# Patient Record
Sex: FEMALE | Race: WHITE | NOT HISPANIC OR LATINO | Employment: OTHER | URBAN - METROPOLITAN AREA
[De-identification: names, ages, dates, MRNs, and addresses within clinical notes are randomized per-mention and may not be internally consistent; named-entity substitution may affect disease eponyms.]

---

## 2017-02-08 ENCOUNTER — ALLSCRIPTS OFFICE VISIT (OUTPATIENT)
Dept: OTHER | Facility: OTHER | Age: 67
End: 2017-02-08

## 2017-02-08 DIAGNOSIS — Z12.31 ENCOUNTER FOR SCREENING MAMMOGRAM FOR MALIGNANT NEOPLASM OF BREAST: ICD-10-CM

## 2017-02-08 DIAGNOSIS — R73.9 HYPERGLYCEMIA: ICD-10-CM

## 2017-02-08 DIAGNOSIS — E78.5 HYPERLIPIDEMIA: ICD-10-CM

## 2017-02-08 DIAGNOSIS — E03.9 HYPOTHYROIDISM: ICD-10-CM

## 2017-02-21 ENCOUNTER — LAB CONVERSION - ENCOUNTER (OUTPATIENT)
Dept: OTHER | Facility: OTHER | Age: 67
End: 2017-02-21

## 2017-02-21 ENCOUNTER — GENERIC CONVERSION - ENCOUNTER (OUTPATIENT)
Dept: OTHER | Facility: OTHER | Age: 67
End: 2017-02-21

## 2017-02-21 LAB
CHOLEST SERPL-MCNC: 215 MG/DL (ref 125–200)
CHOLEST/HDLC SERPL: 6.3 (CALC)
GLUCOSE, PLASMA (HISTORICAL): 111 MG/DL (ref 65–99)
HBA1C MFR BLD HPLC: 6 % OF TOTAL HGB
HDLC SERPL-MCNC: 34 MG/DL
LDL CHOLESTEROL (HISTORICAL): 140 MG/DL (CALC)
NON-HDL-CHOL (CHOL-HDL) (HISTORICAL): 181 MG/DL (CALC)
TRIGL SERPL-MCNC: 205 MG/DL
TSH SERPL DL<=0.05 MIU/L-ACNC: 1.51 MIU/L (ref 0.4–4.5)

## 2017-07-12 ENCOUNTER — ALLSCRIPTS OFFICE VISIT (OUTPATIENT)
Dept: OTHER | Facility: OTHER | Age: 67
End: 2017-07-12

## 2017-07-12 DIAGNOSIS — E78.5 HYPERLIPIDEMIA: ICD-10-CM

## 2017-07-12 DIAGNOSIS — Z12.11 ENCOUNTER FOR SCREENING FOR MALIGNANT NEOPLASM OF COLON: ICD-10-CM

## 2017-07-12 DIAGNOSIS — E03.9 HYPOTHYROIDISM: ICD-10-CM

## 2017-07-12 DIAGNOSIS — R73.9 HYPERGLYCEMIA: ICD-10-CM

## 2017-07-12 DIAGNOSIS — R55 SYNCOPE AND COLLAPSE: ICD-10-CM

## 2017-07-12 DIAGNOSIS — I10 ESSENTIAL (PRIMARY) HYPERTENSION: ICD-10-CM

## 2017-07-13 ENCOUNTER — LAB CONVERSION - ENCOUNTER (OUTPATIENT)
Dept: OTHER | Facility: OTHER | Age: 67
End: 2017-07-13

## 2017-07-13 LAB
A/G RATIO (HISTORICAL): 1.8 (CALC) (ref 1–2.5)
ALBUMIN SERPL BCP-MCNC: 4.6 G/DL (ref 3.6–5.1)
ALP SERPL-CCNC: 63 U/L (ref 33–130)
ALT SERPL W P-5'-P-CCNC: 30 U/L (ref 6–29)
AST SERPL W P-5'-P-CCNC: 24 U/L (ref 10–35)
BILIRUB SERPL-MCNC: 0.5 MG/DL (ref 0.2–1.2)
BUN SERPL-MCNC: 15 MG/DL (ref 7–25)
BUN/CREA RATIO (HISTORICAL): ABNORMAL (CALC) (ref 6–22)
CALCIUM SERPL-MCNC: 9.5 MG/DL (ref 8.6–10.4)
CHLORIDE SERPL-SCNC: 107 MMOL/L (ref 98–110)
CHOLEST SERPL-MCNC: 225 MG/DL (ref 125–200)
CHOLEST/HDLC SERPL: 5.6 (CALC)
CO2 SERPL-SCNC: 24 MMOL/L (ref 20–31)
CREAT SERPL-MCNC: 0.94 MG/DL (ref 0.5–0.99)
EGFR AFRICAN AMERICAN (HISTORICAL): 72 ML/MIN/1.73M2
EGFR-AMERICAN CALC (HISTORICAL): 62 ML/MIN/1.73M2
GAMMA GLOBULIN (HISTORICAL): 2.6 G/DL (CALC) (ref 1.9–3.7)
GLUCOSE (HISTORICAL): 101 MG/DL (ref 65–99)
HBA1C MFR BLD HPLC: 5.6 % OF TOTAL HGB
HDLC SERPL-MCNC: 40 MG/DL
LDL CHOLESTEROL (HISTORICAL): 140 MG/DL (CALC)
NON-HDL-CHOL (CHOL-HDL) (HISTORICAL): 185 MG/DL (CALC)
POTASSIUM SERPL-SCNC: 4.3 MMOL/L (ref 3.5–5.3)
SODIUM SERPL-SCNC: 141 MMOL/L (ref 135–146)
TOTAL PROTEIN (HISTORICAL): 7.2 G/DL (ref 6.1–8.1)
TRIGL SERPL-MCNC: 227 MG/DL
TSH SERPL DL<=0.05 MIU/L-ACNC: 1.07 MIU/L (ref 0.4–4.5)

## 2017-07-18 ENCOUNTER — APPOINTMENT (OUTPATIENT)
Dept: LAB | Facility: HOSPITAL | Age: 67
End: 2017-07-18
Payer: MEDICARE

## 2017-07-18 DIAGNOSIS — Z12.11 ENCOUNTER FOR SCREENING FOR MALIGNANT NEOPLASM OF COLON: ICD-10-CM

## 2017-07-18 LAB — HEMOCCULT STL QL IA: NEGATIVE

## 2017-07-18 PROCEDURE — G0328 FECAL BLOOD SCRN IMMUNOASSAY: HCPCS

## 2017-07-26 ENCOUNTER — GENERIC CONVERSION - ENCOUNTER (OUTPATIENT)
Dept: OTHER | Facility: OTHER | Age: 67
End: 2017-07-26

## 2017-07-26 ENCOUNTER — HOSPITAL ENCOUNTER (OUTPATIENT)
Dept: NON INVASIVE DIAGNOSTICS | Facility: CLINIC | Age: 67
Discharge: HOME | End: 2017-07-26
Payer: MEDICARE

## 2017-07-26 DIAGNOSIS — R55 SYNCOPE AND COLLAPSE: ICD-10-CM

## 2017-07-26 LAB
CHEST PAIN STATEMENT: NORMAL
MAX DIASTOLIC BP: 100 MMHG
MAX HEART RATE: 91 BPM
MAX PREDICTED HEART RATE: 152 BPM
MAX. SYSTOLIC BP: 160 MMHG
PROTOCOL NAME: NORMAL
REASON FOR TERMINATION: NORMAL
TARGET HR FORMULA: NORMAL
TEST INDICATION: NORMAL
TIME IN EXERCISE PHASE: 180 S

## 2017-07-26 PROCEDURE — 93225 XTRNL ECG REC<48 HRS REC: CPT

## 2017-07-26 PROCEDURE — 93226 XTRNL ECG REC<48 HR SCAN A/R: CPT

## 2017-07-26 PROCEDURE — 78452 HT MUSCLE IMAGE SPECT MULT: CPT

## 2017-07-26 PROCEDURE — 93017 CV STRESS TEST TRACING ONLY: CPT

## 2017-07-26 PROCEDURE — A9502 TC99M TETROFOSMIN: HCPCS

## 2017-07-26 RX ADMIN — REGADENOSON 0.4 MG: 0.08 INJECTION, SOLUTION INTRAVENOUS at 13:51

## 2017-08-02 ENCOUNTER — GENERIC CONVERSION - ENCOUNTER (OUTPATIENT)
Dept: OTHER | Facility: OTHER | Age: 67
End: 2017-08-02

## 2017-11-14 ENCOUNTER — ALLSCRIPTS OFFICE VISIT (OUTPATIENT)
Dept: OTHER | Facility: OTHER | Age: 67
End: 2017-11-14

## 2017-11-14 DIAGNOSIS — E78.5 HYPERLIPIDEMIA: ICD-10-CM

## 2017-11-14 DIAGNOSIS — R73.9 HYPERGLYCEMIA: ICD-10-CM

## 2017-11-14 DIAGNOSIS — E03.9 HYPOTHYROIDISM: ICD-10-CM

## 2017-11-14 DIAGNOSIS — I10 ESSENTIAL (PRIMARY) HYPERTENSION: ICD-10-CM

## 2017-11-18 NOTE — PROGRESS NOTES
Assessment    1  Hypertension (401 9) (I10)   2  Hypothyroidism (244 9) (E03 9)   3  Hyperlipidemia (272 4) (E78 5)   4  Hyperglycemia (790 29) (R73 9)   5  Bilateral impacted cerumen (380 4) (H61 23)    Plan  Bilateral impacted cerumen    · Removal Impacted Cerumen Requiring Instrumentation one or both ears - POC;Status:Complete;   Done: [id number]  Hyperglycemia    · (1) HEMOGLOBIN A1C; Status:Active; Requested for:14Nov2017;   Hyperlipidemia    · (1) LIPID PANEL FASTING W DIRECT LDL REFLEX; Status:Active; Requestedfor:14Nov2017;   Hypertension    · (1) COMPREHENSIVE METABOLIC PANEL; Status:Active; Requested for:14Nov2017;   Hypothyroidism    · (1) TSH; Status:Active; Requested for:14Nov2017;     Discussion/Summary    See the patient back in 4 months unless she has problems sooner  Possible side effects of new medications were reviewed with the patient/guardian today  The treatment plan was reviewed with the patient/guardian  The patient/guardian understands and agrees with the treatment plan      Chief Complaint  4 month follow up      History of Present Illness  The patient presents for follow-up for hypertension, hypothyroidism, anemia, and hyperglycemia  She complains of difficulty hearing out of her ears bilaterally      Review of Systems   Constitutional: No fever, no chills, feels well, no tiredness, no recent weight gain or weight loss  Eyes: No complaints of eye pain, no red eyes, no eyesight problems, no discharge, no dry eyes, no itching of eyes  ENT: hearing loss, but-- no complaints of earache, no loss of hearing, no nose bleeds, no nasal discharge, no sore throat, no hoarseness  Cardiovascular: No complaints of slow heart rate, no fast heart rate, no chest pain, no palpitations, no leg claudication, no lower extremity edema  Respiratory: No complaints of shortness of breath, no wheezing, no cough, no SOB on exertion, no orthopnea, no PND    Gastrointestinal: No complaints of abdominal pain, no constipation, no nausea or vomiting, no diarrhea, no bloody stools  Genitourinary: No complaints of dysuria, no incontinence, no pelvic pain, no dysmenorrhea, no vaginal discharge or bleeding  Musculoskeletal: No complaints of arthralgias, no myalgias, no joint swelling or stiffness, no limb pain or swelling  Integumentary: No complaints of skin rash or lesions, no itching, no skin wounds, no breast pain or lump  Neurological: No complaints of headache, no confusion, no convulsions, no numbness, no dizziness or fainting, no tingling, no limb weakness, no difficulty walking  Psychiatric: Not suicidal, no sleep disturbance, no anxiety or depression, no change in personality, no emotional problems  Endocrine: No complaints of proptosis, no hot flashes, no muscle weakness, no deepening of the voice, no feelings of weakness  Hematologic/Lymphatic: No complaints of swollen glands, no swollen glands in the neck, does not bleed easily, does not bruise easily  Active Problems  1  Anxiety (300 00) (F41 9)   2  Anxiety (300 00) (F41 9)   3  Colon cancer screening (V76 51) (Z12 11)   4  Depression with anxiety (300 4) (F41 8)   5  Dizziness (780 4) (R42)   6  Encounter for screening mammogram for breast cancer (V76 12) (Z12 31)   7  Encounter for vaccination (V05 9) (Z23)   8  Fatigue (780 79) (R53 83)   9  History of family problem (V61 8) (Z84 89)   10  Hyperglycemia (790 29) (R73 9)   11  Hyperlipidemia (272 4) (E78 5)   12  Hypertension (401 9) (I10)   13  Hypothyroidism (244 9) (E03 9)   14  Impairment of balance (781 2) (R26 89)   15  Medicare annual wellness visit, initial (V70 0) (Z00 00)   16  Medicare annual wellness visit, subsequent (V70 0) (Z00 00)   17  Mild cognitive impairment (331 83) (G31 84)   18  Near syncope (780 2) (R55)   19  Need for influenza vaccination (V04 81) (Z23)   20  Screening for genitourinary condition (V81 6) (Z13 89)   21   Screening for neurological condition (V80 09) (Z13 89) 22  Skin tag (701 9) (L91 8)   23  Thyroiditis (245 9) (E06 9)   24  Tremor (781 0) (R25 1)    Past Medical History  1  History of anxiety (V11 8) (Z86 59)   2  History of depression (V11 8) (Z86 59)   3  History of hyperlipidemia (V12 29) (Z86 39)   4  History of stroke (V12 54) (Z86 73)    The active problems and past medical history were reviewed and updated today  Surgical History  1  History of Eye Surgery    The surgical history was reviewed and updated today  Family History  Maternal Uncle    1  Family history of Depression   2  Family history of Diabetes Mellitus (V18 0)    The family history was reviewed and updated today  Social History     · Being A Social Drinker   · Completed 12th grade   · Daily Coffee Consumption (___ Cups/Day)   · Daily Cola Consumption (___ Cans/Day)   · Daily Tea Consumption (___ Cups/Day)   · Never A Smoker   · No drug use   · Single  The social history was reviewed and updated today  The social history was reviewed and is unchanged  Current Meds   1  ALPRAZolam 0 25 MG Oral Tablet; TAKE 1 TABLET 3 times daily As needed for Anxiety/Sleep; Last IV:42QVX3671 Ordered   2  Citalopram Hydrobromide 20 MG Oral Tablet; TAKE ONE AND ONE-HALF TABLET(S) DAILY; Therapy: 13JYL6214 to ([facility]:84WNT5123)  Requested for: 29HRV1729; Last Rx:15Vtq0787 Ordered   3  [facility] Fish Oil 1200 MG Oral Capsule; TAKE 2 CAPSULE Daily; Therapy: (Recorded:47Lif6717) to Recorded   4  Levothyroxine Sodium 25 MCG Oral Tablet; TAKE ONE TABLET BY MOUTH ONCE DAILY; Therapy: 36UMF4870 to (Evaluate:20Apr2018)  Requested for: 25Apr2017; Last Rx:25Apr2017 Ordered   5  Metoprolol Succinate ER 25 MG Oral Tablet Extended Release 24 Hour; TAKE ONE TABLET BY MOUTH ONCE DAILY; Therapy: 09UAL2129 to ([place] June)  Requested for: 25Jan2017; Last Rx:25Jan2017; Status: ACTIVE - Transmit to [facility] Ordered   6  Welchol 625 MG Oral Tablet; take 2 tablets by mouth twice a day;  Therapy: 17APV4729 to (Renew:23Mar2017); Last Rx:26Xor5873 Ordered   7  Zostavax 93722 UNT/0 65ML Subcutaneous Solution Reconstituted; RECONSTITUTE AND INJECT IMMEDIATELY; Therapy: 52SNS4168 to (Last Rx:82Xnx5707) Ordered    The medication list was reviewed and updated today  Allergies  1  No Known Drug Allergies    Vitals  Vital Signs    Recorded: 77AAX3149 01:43PM   Temperature 97 3 F   Heart Rate 70   Systolic 135   Diastolic 80   Height 5 ft 0 5 in   Weight 169 lb    BMI Calculated 32 46   BSA Calculated 1 75       Physical Exam   Constitutional  General appearance: No acute distress, well appearing and well nourished  Head and Face  Head and face: Normal    Palpation of the face and sinuses: No sinus tenderness  Eyes  Conjunctiva and lids: No swelling, erythema or discharge  Pupils and irises: Equal, round, reactive to light  Ophthalmoscopic examination: Normal fundi and optic discs  Ears, Nose, Mouth, and Throat  External inspection of ears and nose: Normal    Otoscopic examination: Abnormal  -- TMs are occluded with cerumen bilaterally it was removed using a cerumen loop until the TMs are fully visualized and intact  Hearing: Normal    Nasal mucosa, septum, and turbinates: Normal without edema or erythema  Lips, teeth, and gums: Normal, good dentition  Oropharynx: Normal with no erythema, edema, exudate or lesions  Neck  Neck: Supple, symmetric, trachea midline, no masses  Thyroid: Normal, no thyromegaly  Pulmonary  Respiratory effort: No increased work of breathing or signs of respiratory distress  Percussion of chest: Normal    Palpation of chest: Normal    Auscultation of lungs: Clear to auscultation  Cardiovascular  Palpation of heart: Normal PMI, no thrills  Auscultation of heart: Normal rate and rhythm, normal S1 and S2, no murmurs  Carotid pulses: 2+ bilaterally  Abdominal aorta: Normal    Femoral pulses: 2+ bilaterally  Pedal pulses: 2+ bilaterally     Examination of extremities for edema and/or varicosities: Normal    Chest  Breasts: Normal, no dimpling or skin changes appreciated  Palpation of breasts and axillae: Normal, no masses palpated  Abdomen  Abdomen: Non-tender, no masses  Liver and spleen: No hepatomegaly or splenomegaly  Examination for hernias: No hernia appreciated  Anus, perineum, and rectum: Normal sphincter tone, no masses, no prolapse  Stool sample for occult blood: Negative  Genitourinary  External genitalia and vagina: Normal, no lesions appreciated  Urethra: Normal, no discharge  Bladder: Not distended, no tenderness  Cervix: Normal, no lesions  Uterus: Normal size, no tenderness, no masses  Adnexa/Parametria: Normal, no masses or tenderness  Lymphatic  Palpation of lymph nodes in neck: No lymphadenopathy  Palpation of lymph nodes in axillae: No lymphadenopathy  Palpation of lymph nodes in groin: No lymphadenopathy  Palpation of lymph nodes in other areas: No lymphadenopathy  Musculoskeletal  Gait and station: Normal    Digits and nails: Normal without clubbing or cyanosis  Joints, bones, and muscles: Normal    Range of motion: Normal    Stability: Normal    Muscle strength/tone: Normal    Skin  Skin and subcutaneous tissue: Normal without rashes or lesions  Palpation of skin and subcutaneous tissue: Normal turgor  Neurologic  Cranial nerves: Cranial nerves II-XII intact  Cortical function: Normal mental status  Reflexes: 2+ and symmetric  Sensation: No sensory loss  Coordination: Normal finger to nose and heel to shin  Psychiatric  Judgment and insight: Normal    Orientation to person, place, and time: Normal    Recent and remote memory: Intact  Mood and affect: Normal        Procedure           Procedure: cerumen removal   Indication: tympanic membrane(s) could not be visualized in both ears  Procedure Note: The procedure was performed by the Provider    A otoscope was placed in the ear canal(s) to visualize the ear canal debris  The ear was cleaned by using a curette  Post-Procedure:  Patient Status: the patient tolerated the procedure well  Complications: there were no complications              Future Appointments    Date/Time Provider Specialty Site   03/15/2018 01:15 PM Tavo Joaquin DO Family Medicine [facility] [address]       Signatures   Electronically signed by : Krystal Anna DO; Nov 17 2017  5:26AM EST                       (Author)

## 2017-12-30 ENCOUNTER — LAB CONVERSION - ENCOUNTER (OUTPATIENT)
Dept: OTHER | Facility: OTHER | Age: 67
End: 2017-12-30

## 2017-12-30 LAB
A/G RATIO (HISTORICAL): 1.7 (CALC) (ref 1–2.5)
ALBUMIN SERPL BCP-MCNC: 4.4 G/DL (ref 3.6–5.1)
ALP SERPL-CCNC: 57 U/L (ref 33–130)
ALT SERPL W P-5'-P-CCNC: 34 U/L (ref 6–29)
AST SERPL W P-5'-P-CCNC: 27 U/L (ref 10–35)
BILIRUB SERPL-MCNC: 0.4 MG/DL (ref 0.2–1.2)
BUN SERPL-MCNC: 17 MG/DL (ref 7–25)
BUN/CREA RATIO (HISTORICAL): ABNORMAL (CALC) (ref 6–22)
CALCIUM SERPL-MCNC: 9.4 MG/DL (ref 8.6–10.4)
CHLORIDE SERPL-SCNC: 107 MMOL/L (ref 98–110)
CHOLEST SERPL-MCNC: 226 MG/DL
CHOLEST/HDLC SERPL: 6.5 (CALC)
CO2 SERPL-SCNC: 22 MMOL/L (ref 20–31)
CREAT SERPL-MCNC: 0.97 MG/DL (ref 0.5–0.99)
EGFR AFRICAN AMERICAN (HISTORICAL): 69 ML/MIN/1.73M2
EGFR-AMERICAN CALC (HISTORICAL): 60 ML/MIN/1.73M2
GAMMA GLOBULIN (HISTORICAL): 2.6 G/DL (CALC) (ref 1.9–3.7)
GLUCOSE (HISTORICAL): 108 MG/DL (ref 65–99)
HBA1C MFR BLD HPLC: 5.5 % OF TOTAL HGB
HDLC SERPL-MCNC: 35 MG/DL
LDL CHOLESTEROL (HISTORICAL): 146 MG/DL (CALC)
NON-HDL-CHOL (CHOL-HDL) (HISTORICAL): 191 MG/DL (CALC)
POTASSIUM SERPL-SCNC: 4.2 MMOL/L (ref 3.5–5.3)
SODIUM SERPL-SCNC: 140 MMOL/L (ref 135–146)
TOTAL PROTEIN (HISTORICAL): 7 G/DL (ref 6.1–8.1)
TRIGL SERPL-MCNC: 273 MG/DL
TSH SERPL DL<=0.05 MIU/L-ACNC: 1.12 MIU/L (ref 0.4–4.5)

## 2018-01-11 NOTE — RESULT NOTES
Verified Results  (1) LIPID PANEL, FASTING 17Wuk1358 12:22PM Abhishek Vilchis     Test Name Result Flag Reference   CHOLESTEROL, TOTAL 205 mg/dL H 125-200   HDL CHOLESTEROL 35 mg/dL L > OR = 46   TRIGLICERIDES 962 mg/dL H <150   LDL-CHOLESTEROL 119 mg/dL (calc)  <130   Desirable range <100 mg/dL for patients with CHD or  diabetes and <70 mg/dL for diabetic patients with  known heart disease  CHOL/HDLC RATIO 5 9 (calc) H < OR = 5 0   NON HDL CHOLESTEROL 170 mg/dL (calc) H    Target for non-HDL cholesterol is 30 mg/dL higher than   LDL cholesterol target

## 2018-01-11 NOTE — RESULT NOTES
Verified Results  (Q) LIPID PANEL WITH REFLEX TO DIRECT LDL [id number] 10:35AM Mariskim Chantell     Test Name Result Flag Reference   CHOLESTEROL, TOTAL 215 mg/dL H 125-200   HDL CHOLESTEROL 34 mg/dL L > OR = 46   TRIGLICERIDES 838 mg/dL H <150   LDL-CHOLESTEROL 140 mg/dL (calc) H <130   Desirable range <100 mg/dL for patients with CHD or  diabetes and <70 mg/dL for diabetic patients with  known heart disease  CHOL/HDLC RATIO 6 3 (calc) H < OR = 5 0   NON HDL CHOLESTEROL 181 mg/dL (calc) H    Target for non-HDL cholesterol is 30 mg/dL higher than   LDL cholesterol target

## 2018-01-12 VITALS
TEMPERATURE: 97.5 F | BODY MASS INDEX: 31.34 KG/M2 | SYSTOLIC BLOOD PRESSURE: 128 MMHG | DIASTOLIC BLOOD PRESSURE: 84 MMHG | HEIGHT: 61 IN | HEART RATE: 64 BPM | WEIGHT: 166 LBS

## 2018-01-12 NOTE — RESULT NOTES
Verified Results  * MRI BRAIN WO CONTRAST [id number] 02:10PM Eron Brow Order Number: [id number]   Performing Comments: Hx of hemorrhage in 2004 with subsequent memory issues and imbalance  Now also wtih tremor and persistent balance and memory issues   - Patient Instructions: To schedule this appointment, please contact Central Scheduling at [phone]  Test Name Result Flag Reference   MRI BRAIN WO CONTRAST (Report)     This is a summary report  The complete report is available in the patient's medical record  If you cannot access the medical record, please contact the sending organization for a detailed fax or copy  MRI BRAIN WITHOUT CONTRAST     INDICATION: Memory loss  Balance disturbance  COMPARISON:  6/10/2004  TECHNIQUE: Sagittal T1, axial T2, axial FLAIR, axial T1, axial Gradient and axial diffusion imaging  Coronal T2      IMAGE QUALITY: Diagnostic  FINDINGS:     BRAIN PARENCHYMA: Stable appearance of a chronic parenchymal hemorrhage identified within the left medial parietal lobe adjacent to the falx  There is chronic hemosiderin staining with mild focal adjacent gliosis  No associated diffusion abnormality  Minimal white matter change within the remainder of the cerebral hemispheres suggesting chronic microangiopathic disease  Normal corpus callosum and hypothalamus  Small white matter lesions within the mynor suggesting chronic microangiopathic disease  Symmetric mesial temporal lobes  No significant mesial temporal lobe volume loss  VENTRICLES: The ventricles are normal in size and contour  SELLA AND PITUITARY GLAND: Normal      ORBITS: Normal      PARANASAL SINUSES: Normal      VASCULATURE: Evaluation of the major intracranial vasculature demonstrates appropriate flow voids       CALVARIUM AND SKULL BASE: Normal      EXTRACRANIAL SOFT TISSUES: Normal        IMPRESSION:     Sequela of prior parenchymal hemorrhage within the medial aspect of the left parietal lobe as seen in 2004  Minimal adjacent gliosis noted on T2 and FLAIR imaging  No signs of acute hemorrhage or acute ischemia  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

## 2018-01-13 VITALS
HEIGHT: 61 IN | BODY MASS INDEX: 31.81 KG/M2 | TEMPERATURE: 98.6 F | SYSTOLIC BLOOD PRESSURE: 122 MMHG | WEIGHT: 168.5 LBS | DIASTOLIC BLOOD PRESSURE: 82 MMHG | HEART RATE: 70 BPM

## 2018-01-13 NOTE — RESULT NOTES
Verified Results  HOLTER MONITOR - [address] 01:17PM Odessa Simmonds Order Number: [id number]    - Patient Instructions: To schedule this appointment, please contact Central Scheduling at [phone]  Test Name Result Flag Reference   HOLTER MONITOR - 50 HOUR (Report)     PT NAME: Barbara Pichardo   : 1950 AGE: 74 y o  GENDER: female   MRN: [id number]  PROCEDURE: Holter monitor - 48 hour     INDICATIONS: Syncope     DESCRIPTION OF FINDINGS:   The patient was monitored for a total of 47 hours and 59 minutes  Predominant rhythm throughout the tracing noted to be sinus mechanism with an average heart rate of 53 beats per minute  A minimum heart rate of 39 beats per minute was noted at 7:22 AM    with a maximum heart rate of 100 beats per minute noted at 2:07 PM      No ventricular ectopic activity or sustained ventricular rhythms were noted  Rare supraventricular ectopic activity consisted of 22 single PACs  4 were in couplets and 3 were in triplets  No sustained supraventricular rhythms were noted  There was no evidence of atrial fibrillation or atrial flutter  There were no significant pauses or high grade AV block  Normal diurnal heart rate variation  1  Predominant rhythm noted throughout the tracing was sinus mechanism with an average heart rate of 53 bpm    2  Rare supraventricular ectopic activity without any sustained supraventricular rhythms  3  The patient's diary included 4 entries of weakness, shakiness, and tiredness all of which correlated with normal sinus rhythm

## 2018-01-14 VITALS
SYSTOLIC BLOOD PRESSURE: 122 MMHG | HEIGHT: 61 IN | BODY MASS INDEX: 31.91 KG/M2 | WEIGHT: 169 LBS | TEMPERATURE: 97.3 F | DIASTOLIC BLOOD PRESSURE: 80 MMHG | HEART RATE: 70 BPM

## 2018-01-15 NOTE — RESULT NOTES
Verified Results  (1) LIPID PANEL, FASTING [id number] 11:56AM Monet Yayapsencer     Test Name Result Flag Reference   CHOLESTEROL, TOTAL 208 mg/dL H 125-200   HDL CHOLESTEROL 34 mg/dL L > OR = 46   TRIGLICERIDES 087 mg/dL H <150   LDL-CHOLESTEROL 122 mg/dL (calc)  <130   Desirable range <100 mg/dL for patients with CHD or  diabetes and <70 mg/dL for diabetic patients with  known heart disease  CHOL/HDLC RATIO 6 1 (calc) H < OR = 5 0   NON HDL CHOLESTEROL 174 mg/dL (calc) H    Target for non-HDL cholesterol is 30 mg/dL higher than   LDL cholesterol target

## 2018-01-16 NOTE — RESULT NOTES
Verified Results  NM MYOCARDIAL PERFUSION SPECT (RX STRESS AND/OR REST) [id number] 11:58AM Leslie Gaona Order Number: [id number]    - Patient Instructions: To schedule this appointment, please contact Central Scheduling at [phone]  Test Name Result Flag Reference   NM MYOCARDIAL PERFUSION SPECT (RX STRESS AND/OR REST) (Report)     [address], [address] [place] [address]   [phone]     Rest/Stress Gated SPECT Myocardial Perfusion Imaging After Regadenoson     Patient: Yemi Schuler   MR number: [id number]   Account number: [de-identified]   : 1950   Age: 74 years   Gender: Female   Status: Outpatient   Location: Stress lab   Height: 65 in   Weight: 168 lb   BP: 160/ 100 mmHg     Allergies: ALLERGIES NOT ON FILE     Diagnosis: R55  - Syncope and collapse     Primary Physician: Fadi Sarabia DO   RN: Mecca Brown RN   Referring Physician: Fadi Sarabia DO   Group: [facility]   Report Prepared By[de-identified] Mecca Brown RN   Interpreting Physician: Marlin Durant MD     INDICATIONS: Detection of coronary artery disease  HISTORY: The patient is a 74year old  female  Chest pain status: no chest pain  Other symptoms: stress related pre-syncope  Coronary artery disease risk factors: hypertension, diabetes mellitus, and post-menopausal state  Co-morbidity: obesity  PHYSICAL EXAM: Baseline physical exam screening: normal and no wheezes audible  REST ECG: Sinus bradycardia  PROCEDURE: The study was performed in the stress lab  A regadenoson infusion pharmacologic stress test was performed  Gated SPECT myocardial perfusion imaging was performed after stress  Systolic blood pressure was 160 mmHg, at the start   of the study  Diastolic blood pressure was 100 mmHg, at the start of the study  The heart rate was 51 bpm, at the start of the study  IV double checked     Regadenoson protocol:   HR bpm SBP mmHg DBP mmHg Symptoms   Baseline 51 160 100 none   1 min 72 150 80 tingling   2 min 90 120 80 subsiding   3 min 76 140 80 none   No medications or fluids given  The patient also performed leg swinging and hand  exercises  STRESS SUMMARY: Duration of pharmacologic stress was 3 min and 0 sec  Functional capacity could not be adequately assessed  Maximal heart rate during stress was 91 bpm  The rate-pressure product for the peak heart rate and blood pressure   was 82928  There was no chest pain during stress  The stress test was terminated due to protocol completion  Pre oxygen saturation: 98 %  Peak oxygen saturation: 99 %  The stress ECG was negative for ischemia  There were no stress   arrhythmias or conduction abnormalities  ISOTOPE ADMINISTRATION:   Resting isotope administration Stress isotope administration   Agent Tetrofosmin Tetrofosmin   Dose 10 7 mCi 32 6 mCi   Date 07/26/2017 07/26/2017   Injection time 12:43 13:48   Injection-image interval 35 min 36 min     The radiopharmaceutical was injected at the peak effect of pharmacologic stress  IMAGE PROPERTIES:   Chest: circumference 36 in, D cup  MYOCARDIAL PERFUSION IMAGING:   The image quality was excellent  Left ventricular size was normal  The TID ratio was 1 16  PERFUSION DEFECTS:   - There were no perfusion defects  GATED SPECT:   The calculated left ventricular ejection fraction was 76 %  Left ventricular ejection fraction was within normal limits by visual estimate  Ejection fraction was overestimated due to small heart size  There was no left ventricular regional   abnormality  SUMMARY:   - Stress results: There was no chest pain during stress  - ECG conclusions: The stress ECG was negative for ischemia  - Perfusion imaging: There were no perfusion defects    - Gated SPECT: The calculated left ventricular ejection fraction was 76 %  Left ventricular ejection fraction was within normal limits by visual estimate   There was no left ventricular regional abnormality  IMPRESSIONS: Normal study after pharmacologic vasodilation  Myocardial perfusion imaging was normal at rest and with stress   Left ventricular systolic function was normal      Prepared and signed by     Nita Geronimo MD   Signed 07/26/2017 15:08:11

## 2018-03-15 ENCOUNTER — OFFICE VISIT (OUTPATIENT)
Dept: FAMILY MEDICINE CLINIC | Facility: CLINIC | Age: 68
End: 2018-03-15
Payer: MEDICARE

## 2018-03-15 VITALS
BODY MASS INDEX: 32.32 KG/M2 | WEIGHT: 171.2 LBS | HEART RATE: 68 BPM | SYSTOLIC BLOOD PRESSURE: 118 MMHG | DIASTOLIC BLOOD PRESSURE: 78 MMHG | HEIGHT: 61 IN | TEMPERATURE: 98.1 F

## 2018-03-15 DIAGNOSIS — E78.00 HYPERCHOLESTEROLEMIA: ICD-10-CM

## 2018-03-15 DIAGNOSIS — I10 ESSENTIAL HYPERTENSION: ICD-10-CM

## 2018-03-15 DIAGNOSIS — E03.9 HYPOTHYROIDISM, UNSPECIFIED TYPE: ICD-10-CM

## 2018-03-15 DIAGNOSIS — E13.9 DIABETES 1.5, MANAGED AS TYPE 2 (HCC): Primary | ICD-10-CM

## 2018-03-15 PROCEDURE — 99214 OFFICE O/P EST MOD 30 MIN: CPT | Performed by: FAMILY MEDICINE

## 2018-03-15 RX ORDER — CHLORHEXIDINE GLUCONATE 0.12 MG/ML
RINSE ORAL
COMMUNITY
Start: 2018-01-16 | End: 2018-07-17 | Stop reason: ALTCHOICE

## 2018-03-15 RX ORDER — ALPRAZOLAM 0.25 MG/1
TABLET ORAL EVERY 8 HOURS
COMMUNITY
End: 2018-10-09 | Stop reason: SDUPTHER

## 2018-03-15 RX ORDER — CITALOPRAM 20 MG/1
TABLET ORAL
COMMUNITY
Start: 2016-09-21 | End: 2018-07-17 | Stop reason: ALTCHOICE

## 2018-03-15 RX ORDER — METOPROLOL SUCCINATE 25 MG/1
1 TABLET, EXTENDED RELEASE ORAL DAILY
COMMUNITY
Start: 2014-08-19 | End: 2019-02-08 | Stop reason: SDUPTHER

## 2018-03-15 RX ORDER — LEVOTHYROXINE SODIUM 0.03 MG/1
1 TABLET ORAL DAILY
COMMUNITY
Start: 2011-07-05 | End: 2018-07-17 | Stop reason: ALTCHOICE

## 2018-03-16 NOTE — PROGRESS NOTES
Patient ID: Maryanne Hernandes is a 69 y o  female  HPI: 69 y  o female presents for f/u of type 2 diabetes (diet controlled), htn, hypothyroidism and hypercholesterolemia  All are well -controlled at this time  SUBJECTIVE    Family History   Problem Relation Age of Onset    Depression Maternal Uncle     Diabetes Maternal Uncle      Social History     Social History    Marital status: Single     Spouse name: N/A    Number of children: N/A    Years of education: completed 12th grade     Occupational History    Not on file       Social History Main Topics    Smoking status: Never Smoker    Smokeless tobacco: Not on file    Alcohol use Yes      Comment: social    Drug use: No    Sexual activity: Not on file     Other Topics Concern    Not on file     Social History Narrative    Daily coffee consumption: ____cp/day    Daily cola consumption: ___ can/day    daily tea consumption: _____cp/day         Past Medical History:   Diagnosis Date    Anxiety     Depression     Hyperlipidemia     [facility] [address] )      Past Surgical History:   Procedure Laterality Date    EYE SURGERY      cosmetic     No Known Allergies    Current Outpatient Prescriptions:     ALPRAZolam (XANAX) 0 25 mg tablet, Take by mouth every 8 (eight) hours, Disp: , Rfl:     chlorhexidine (PERIDEX) 0 12 % solution, , Disp: , Rfl:     citalopram (CeleXA) 20 mg tablet, Take by mouth, Disp: , Rfl:     levothyroxine 25 mcg tablet, Take 1 tablet by mouth daily, Disp: , Rfl:     metoprolol succinate (TOPROL-XL) 25 mg 24 hr tablet, Take 1 tablet by mouth daily, Disp: , Rfl:     Review of Systems  Constitutional:     Denies fever, chills ,fatigue ,weakness ,weight loss, weight gain     ENT: Denies earache ,loss of hearing ,nosebleed, nasal discharge,nasal congestion ,sore throat ,hoarseness  Pulmonary: Denies shortness of breath ,cough  ,dyspnea on exertion, orthopnea  ,PND   Cardiovascular:  Denies bradycardia , tachycardia  ,palpations, lower extremity edema leg, claudication  Breast:  Denies new or changing breast lumps ,nipple discharge ,nipple changes  Abdomen:  Denies abdominal pain , anorexia , indigestion, nausea, vomiting, constipation, diarrhea  Musculoskeletal: Denies myalgias, arthralgias, joint swelling, joint stiffness , limb pain, limb swelling  Gu: denies dysuria, polyuria  Skin: Denies skin rash, skin lesion, skin wound, itching, dry skin  Neuro: Denies headache, numbness, tingling, confusion, loss of consciousness, dizziness, vertigo  Psychiatric: Denies feelings of depression, suicidal ideation, anxiety, sleep disturbances    OBJECTIVE    Constitutional:   NAD, well appearing and well nourished      ENT:   Conjunctiva and lids: no injection, edema, or discharge     Pupils and iris: HENRY bilaterally    External inspection of ears and nose: normal without deformities or discharge  Otoscopic exam: Canals patent without erythema  Nasal mucosa, septum and turbinates: Normal or edema or discharge         Oropharynx:  Moist mucosa, normal tongue and tonsils without lesions  No erythema        Pulmonary:Respiratory effort normal rate and rhythm, no increased work of breathing   Auscultation of lungs:  Clear bilaterally with no adventitious breath sounds       Cardiovascular: regular rate and rhythm, S1 and S2, no murmur, no edema and/or varicosities of LE      Abdomen: Soft and non-distended     Positive bowel sounds      No heptomegaly or splenomegaly      Gu: no suprapubic tenderness or CVA tenderness, no urethral discharge  Lymphatic:  No anterior or posterior cervical lymphadenopathy         Musculoskeletal:  Gait and station: Normal gait      Digits and nails normal without clubbing or cyanosis       Inspection/palpation of joints, bones, and muscles:  No joint tenderness, swelling, full active and passive range of motion       Skin: Normal skin turgor and no rashes      Neuro:    Normal reflexes     Psych:   alert and oriented to person, place and time     normal mood and affect   Patient's shoes and socks removed  Right Foot/Ankle   Right Foot Inspection  Skin Exam: skin normal and skin intact no dry skin, no warmth, no callus, no erythema, no maceration, no abnormal color, no pre-ulcer, no ulcer and no callus                          Toe Exam: ROM and strength within normal limitsno swelling, no tenderness, erythema and  no right toe deformity  Sensory   Vibration: intact  Proprioception: intact   Monofilament testing: intact  Vascular  Capillary refills: < 3 seconds  The right DP pulse is 2+  Left Foot/Ankle  Left Foot Inspection  Skin Exam: skin normal and skin intactno dry skin, no warmth, no erythema, no maceration, normal color, no pre-ulcer, no ulcer and no callus                         Toe Exam: ROM and strength within normal limitsno swelling, no tenderness, no erythema and no left toe deformity                   Sensory   Vibration: intact  Proprioception: intact  Monofilament: intact  Vascular  Capillary refills: < 3 seconds  The left DP pulse is 2+  Assign Risk Category:  No deformity present; No loss of protective sensation; No weak pulses       Risk: 2      Assessment/Plan:Diagnoses and all orders for this visit:    Diabetes 1 5, managed as type 2 ([facility] )  -     Microalbumin / creatinine urine ratio  -     HEMOGLOBIN A1C W/ EAG ESTIMATION; Future    Hypothyroidism, unspecified type  -     TSH baseline; Future    Essential hypertension  -     Comprehensive metabolic panel; Future    Hypercholesterolemia  -     Lipid Panel with Direct LDL reflex; Future    Other orders  -     ALPRAZolam (XANAX) 0 25 mg tablet; Take by mouth every 8 (eight) hours  -     chlorhexidine (PERIDEX) 0 12 % solution;   -     citalopram (CeleXA) 20 mg tablet; Take by mouth  -     levothyroxine 25 mcg tablet; Take 1 tablet by mouth daily  -     metoprolol succinate (TOPROL-XL) 25 mg 24 hr tablet;  Take 1 tablet by mouth daily        I will see patient back in 4 mos or sooner prn

## 2018-04-23 DIAGNOSIS — E03.9 HYPOTHYROIDISM, UNSPECIFIED TYPE: Primary | ICD-10-CM

## 2018-04-23 RX ORDER — LEVOTHYROXINE SODIUM 0.03 MG/1
25 TABLET ORAL DAILY
Qty: 90 TABLET | Refills: 3 | Status: SHIPPED | OUTPATIENT
Start: 2018-04-23 | End: 2019-04-18 | Stop reason: SDUPTHER

## 2018-05-31 DIAGNOSIS — F32.A DEPRESSION, UNSPECIFIED DEPRESSION TYPE: Primary | ICD-10-CM

## 2018-05-31 RX ORDER — CITALOPRAM 20 MG/1
20 TABLET ORAL DAILY
Qty: 45 TABLET | Refills: 3 | Status: SHIPPED | OUTPATIENT
Start: 2018-05-31 | End: 2018-10-02 | Stop reason: SDUPTHER

## 2018-07-10 LAB
ALBUMIN SERPL-MCNC: 4.1 G/DL (ref 3.6–5.1)
ALBUMIN/GLOB SERPL: 1.6 (CALC) (ref 1–2.5)
ALP SERPL-CCNC: 58 U/L (ref 33–130)
ALT SERPL-CCNC: 51 U/L (ref 6–29)
AST SERPL-CCNC: 39 U/L (ref 10–35)
BILIRUB SERPL-MCNC: 0.5 MG/DL (ref 0.2–1.2)
BUN SERPL-MCNC: 13 MG/DL (ref 7–25)
BUN/CREAT SERPL: ABNORMAL (CALC) (ref 6–22)
CALCIUM SERPL-MCNC: 9.1 MG/DL (ref 8.6–10.4)
CHLORIDE SERPL-SCNC: 107 MMOL/L (ref 98–110)
CHOLEST SERPL-MCNC: 219 MG/DL
CHOLEST/HDLC SERPL: 6.4 (CALC)
CO2 SERPL-SCNC: 24 MMOL/L (ref 20–31)
CREAT SERPL-MCNC: 0.88 MG/DL (ref 0.5–0.99)
EST. AVERAGE GLUCOSE BLD GHB EST-MCNC: 117 (CALC)
EST. AVERAGE GLUCOSE BLD GHB EST-SCNC: 6.5 (CALC)
GLOBULIN SER CALC-MCNC: 2.5 G/DL (CALC) (ref 1.9–3.7)
GLUCOSE SERPL-MCNC: 98 MG/DL (ref 65–99)
HBA1C MFR BLD: 5.7 % OF TOTAL HGB
HDLC SERPL-MCNC: 34 MG/DL
LDLC SERPL CALC-MCNC: 147 MG/DL (CALC)
NONHDLC SERPL-MCNC: 185 MG/DL (CALC)
POTASSIUM SERPL-SCNC: 3.8 MMOL/L (ref 3.5–5.3)
PROT SERPL-MCNC: 6.6 G/DL (ref 6.1–8.1)
SL AMB EGFR AFRICAN AMERICAN: 78 ML/MIN/1.73M2
SL AMB EGFR NON AFRICAN AMERICAN: 67 ML/MIN/1.73M2
SODIUM SERPL-SCNC: 142 MMOL/L (ref 135–146)
TRIGL SERPL-MCNC: 248 MG/DL
TSH SERPL-ACNC: 1.45 MIU/L (ref 0.4–4.5)

## 2018-07-17 ENCOUNTER — OFFICE VISIT (OUTPATIENT)
Dept: FAMILY MEDICINE CLINIC | Facility: CLINIC | Age: 68
End: 2018-07-17
Payer: MEDICARE

## 2018-07-17 VITALS
HEIGHT: 60 IN | BODY MASS INDEX: 34.55 KG/M2 | DIASTOLIC BLOOD PRESSURE: 74 MMHG | TEMPERATURE: 98.2 F | SYSTOLIC BLOOD PRESSURE: 108 MMHG | WEIGHT: 176 LBS | RESPIRATION RATE: 14 BRPM | HEART RATE: 70 BPM

## 2018-07-17 DIAGNOSIS — I10 ESSENTIAL HYPERTENSION: ICD-10-CM

## 2018-07-17 DIAGNOSIS — E13.9 DIABETES 1.5, MANAGED AS TYPE 2 (HCC): Primary | ICD-10-CM

## 2018-07-17 DIAGNOSIS — E78.00 HYPERCHOLESTEROLEMIA: ICD-10-CM

## 2018-07-17 DIAGNOSIS — Z13.820 SCREENING FOR OSTEOPOROSIS: ICD-10-CM

## 2018-07-17 DIAGNOSIS — Z12.39 SCREENING FOR BREAST CANCER: ICD-10-CM

## 2018-07-17 DIAGNOSIS — Z12.4 SCREENING FOR CERVICAL CANCER: ICD-10-CM

## 2018-07-17 PROCEDURE — 99214 OFFICE O/P EST MOD 30 MIN: CPT | Performed by: FAMILY MEDICINE

## 2018-07-18 NOTE — PROGRESS NOTES
Patient ID: Jacque Vergara is a 69 y o  female  HPI: 69 y  o female presents for follow up of type 2 dm, htn, and hypercholesterolemia  She is due for labs at this time, as wel as a mammo and dexa scan  SUBJECTIVE    Family History   Problem Relation Age of Onset    Depression Maternal Uncle     Diabetes Maternal Uncle      Social History     Social History    Marital status: Single     Spouse name: N/A    Number of children: N/A    Years of education: completed 12th grade     Occupational History    Not on file       Social History Main Topics    Smoking status: Never Smoker    Smokeless tobacco: Not on file    Alcohol use Yes      Comment: social    Drug use: No    Sexual activity: Not on file     Other Topics Concern    Not on file     Social History Narrative    Daily coffee consumption: ____cp/day    Daily cola consumption: ___ can/day    daily tea consumption: _____cp/day         Past Medical History:   Diagnosis Date    Anxiety     Depression     Hyperlipidemia     Stroke ([facility] [address] )      Past Surgical History:   Procedure Laterality Date    EYE SURGERY      cosmetic     No Known Allergies    Current Outpatient Prescriptions:     ALPRAZolam (XANAX) 0 25 mg tablet, Take by mouth every 8 (eight) hours, Disp: , Rfl:     citalopram (CeleXA) 20 mg tablet, Take 1 tablet (20 mg total) by mouth daily Take one and one half of a tablet daily (30 mg total ), Disp: 45 tablet, Rfl: 3    levothyroxine 25 mcg tablet, Take 1 tablet (25 mcg total) by mouth daily, Disp: 90 tablet, Rfl: 3    metoprolol succinate (TOPROL-XL) 25 mg 24 hr tablet, Take 1 tablet by mouth daily, Disp: , Rfl:     pitavastatin 1 MG TABS, Take 1 tablet (1 mg total) by mouth daily with dinner, Disp: 30 tablet, Rfl: 3    Review of Systems  Constitutional:     Denies fever, chills ,fatigue ,weakness ,weight loss, weight gain     ENT: Denies earache ,loss of hearing ,nosebleed, nasal discharge,nasal congestion ,sore throat ,hoarseness  Pulmonary: Denies shortness of breath ,cough  ,dyspnea on exertion, orthopnea  ,PND   Cardiovascular:  Denies bradycardia , tachycardia  ,palpations, lower extremity edema leg, claudication  Breast:  Denies new or changing breast lumps ,nipple discharge ,nipple changes  Abdomen:  Denies abdominal pain , anorexia , indigestion, nausea, vomiting, constipation, diarrhea  Musculoskeletal: Denies myalgias, arthralgias, joint swelling, joint stiffness , limb pain, limb swelling  Gu: denies dysuria, polyuria  Skin: Denies skin rash, skin lesion, skin wound, itching, dry skin  Neuro: Denies headache, numbness, tingling, confusion, loss of consciousness, dizziness, vertigo  Psychiatric: Denies feelings of depression, suicidal ideation, anxiety, sleep disturbances    OBJECTIVE  /74   Pulse 70   Temp 98 2 °F (36 8 °C)   Resp 14   Ht 4' 11 75" (1 518 m)   Wt 79 8 kg (176 lb)   BMI 34 66 kg/m²   Constitutional:   NAD, well appearing and well nourished      ENT:   Conjunctiva and lids: no injection, edema, or discharge     Pupils and iris: HENRY bilaterally    External inspection of ears and nose: normal without deformities or discharge  Otoscopic exam: Canals patent without erythema  Nasal mucosa, septum and turbinates: Normal or edema or discharge         Oropharynx:  Moist mucosa, normal tongue and tonsils without lesions  No erythema        Pulmonary:Respiratory effort normal rate and rhythm, no increased work of breathing   Auscultation of lungs:  Clear bilaterally with no adventitious breath sounds       Cardiovascular: regular rate and rhythm, S1 and S2, no murmur, no edema and/or varicosities of LE      Abdomen: Soft and non-distended     Positive bowel sounds      No heptomegaly or splenomegaly      Gu: no suprapubic tenderness or CVA tenderness, no urethral discharge  Lymphatic:  No anterior or posterior cervical lymphadenopathy         Musculoskeletal:  Gait and station: Normal gait Digits and nails normal without clubbing or cyanosis       Inspection/palpation of joints, bones, and muscles:  No joint tenderness, swelling, full active and passive range of motion       Skin: Normal skin turgor and no rashes      Neuro:    Normal reflexes     Psych:   alert and oriented to person, place and time     normal mood and affect   Patient's shoes and socks removed  Right Foot/Ankle   Right Foot Inspection  Skin Exam: skin normal and skin intact no dry skin, no warmth, no callus, no erythema, no maceration, no abnormal color, no pre-ulcer, no ulcer and no callus                          Toe Exam: ROM and strength within normal limits  Sensory   Vibration: intact  Proprioception: intact   Monofilament testing: intact  Vascular  Capillary refills: < 3 seconds  The right DP pulse is 2+  The right PT pulse is 2+  Left Foot/Ankle  Left Foot Inspection  Skin Exam: skin normal and skin intactno dry skin, no warmth, no erythema, no maceration, normal color, no pre-ulcer, no ulcer and no callus                         Toe Exam: ROM and strength within normal limits                   Sensory   Vibration: intact  Proprioception: intact  Monofilament: intact  Vascular  Capillary refills: < 3 seconds  The left DP pulse is 2+  The left PT pulse is 2+  Assign Risk Category:  No deformity present; No loss of protective sensation; No weak pulses       Risk: 0      Assessment/Plan:Diagnoses and all orders for this visit:    Diabetes 1 5, managed as type 2 ([facility] )  -     HEMOGLOBIN A1C W/ EAG ESTIMATION; Future    Essential hypertension  -     Comprehensive metabolic panel; Future    Hypercholesterolemia  -     pitavastatin 1 MG TABS; Take 1 tablet (1 mg total) by mouth daily with dinner  -     Lipid Panel with Direct LDL reflex; Future    Screening for cervical cancer  -     Liquid-based pap, screening    Screening for breast cancer  -     Mammo screening bilateral w cad;  Future    Screening for osteoporosis  - DXA bone density spine hip and pelvis; Future      I will see patient back in 4 mos or sooner prn

## 2018-10-02 DIAGNOSIS — F32.A DEPRESSION, UNSPECIFIED DEPRESSION TYPE: ICD-10-CM

## 2018-10-02 RX ORDER — CITALOPRAM 20 MG/1
TABLET ORAL
Qty: 45 TABLET | Refills: 3 | Status: SHIPPED | OUTPATIENT
Start: 2018-10-02 | End: 2019-02-08 | Stop reason: SDUPTHER

## 2018-10-09 DIAGNOSIS — F41.9 ANXIETY: Primary | ICD-10-CM

## 2018-10-09 RX ORDER — ALPRAZOLAM 0.25 MG/1
0.25 TABLET ORAL 3 TIMES DAILY PRN
Qty: 90 TABLET | Refills: 3 | Status: SHIPPED | OUTPATIENT
Start: 2018-10-09 | End: 2020-01-07 | Stop reason: SDUPTHER

## 2018-11-14 LAB
ALBUMIN SERPL-MCNC: 4.3 G/DL (ref 3.6–5.1)
ALBUMIN/GLOB SERPL: 1.7 (CALC) (ref 1–2.5)
ALP SERPL-CCNC: 52 U/L (ref 33–130)
ALT SERPL-CCNC: 46 U/L (ref 6–29)
AST SERPL-CCNC: 41 U/L (ref 10–35)
BILIRUB SERPL-MCNC: 0.6 MG/DL (ref 0.2–1.2)
BUN SERPL-MCNC: 13 MG/DL (ref 7–25)
BUN/CREAT SERPL: ABNORMAL (CALC) (ref 6–22)
CALCIUM SERPL-MCNC: 9.2 MG/DL (ref 8.6–10.4)
CHLORIDE SERPL-SCNC: 106 MMOL/L (ref 98–110)
CHOLEST SERPL-MCNC: 203 MG/DL
CHOLEST/HDLC SERPL: 5.3 (CALC)
CO2 SERPL-SCNC: 25 MMOL/L (ref 20–32)
CREAT SERPL-MCNC: 0.87 MG/DL (ref 0.6–0.93)
EST. AVERAGE GLUCOSE BLD GHB EST-MCNC: 120 (CALC)
EST. AVERAGE GLUCOSE BLD GHB EST-SCNC: 6.6 (CALC)
GLOBULIN SER CALC-MCNC: 2.5 G/DL (CALC) (ref 1.9–3.7)
GLUCOSE SERPL-MCNC: 107 MG/DL (ref 65–99)
HBA1C MFR BLD: 5.8 % OF TOTAL HGB
HDLC SERPL-MCNC: 38 MG/DL
LDLC SERPL CALC-MCNC: 131 MG/DL (CALC)
NONHDLC SERPL-MCNC: 165 MG/DL (CALC)
POTASSIUM SERPL-SCNC: 3.8 MMOL/L (ref 3.5–5.3)
PROT SERPL-MCNC: 6.8 G/DL (ref 6.1–8.1)
SL AMB EGFR AFRICAN AMERICAN: 78 ML/MIN/1.73M2
SL AMB EGFR NON AFRICAN AMERICAN: 67 ML/MIN/1.73M2
SODIUM SERPL-SCNC: 140 MMOL/L (ref 135–146)
TRIGL SERPL-MCNC: 204 MG/DL

## 2018-11-19 ENCOUNTER — OFFICE VISIT (OUTPATIENT)
Dept: FAMILY MEDICINE CLINIC | Facility: CLINIC | Age: 68
End: 2018-11-19
Payer: MEDICARE

## 2018-11-19 VITALS — TEMPERATURE: 98.1 F | SYSTOLIC BLOOD PRESSURE: 128 MMHG | DIASTOLIC BLOOD PRESSURE: 82 MMHG | HEART RATE: 72 BPM

## 2018-11-19 DIAGNOSIS — E03.9 HYPOTHYROIDISM, UNSPECIFIED TYPE: ICD-10-CM

## 2018-11-19 DIAGNOSIS — E13.9 DIABETES 1.5, MANAGED AS TYPE 2 (HCC): Primary | ICD-10-CM

## 2018-11-19 DIAGNOSIS — E78.00 HYPERCHOLESTEROLEMIA: ICD-10-CM

## 2018-11-19 DIAGNOSIS — Z23 NEED FOR VACCINATION: ICD-10-CM

## 2018-11-19 DIAGNOSIS — H61.23 BILATERAL IMPACTED CERUMEN: ICD-10-CM

## 2018-11-19 DIAGNOSIS — I10 ESSENTIAL HYPERTENSION: ICD-10-CM

## 2018-11-19 PROCEDURE — 99214 OFFICE O/P EST MOD 30 MIN: CPT | Performed by: FAMILY MEDICINE

## 2018-11-19 PROCEDURE — G0008 ADMIN INFLUENZA VIRUS VAC: HCPCS | Performed by: FAMILY MEDICINE

## 2018-11-19 PROCEDURE — 90662 IIV NO PRSV INCREASED AG IM: CPT | Performed by: FAMILY MEDICINE

## 2018-11-20 PROCEDURE — 69210 REMOVE IMPACTED EAR WAX UNI: CPT | Performed by: FAMILY MEDICINE

## 2018-11-20 NOTE — PROGRESS NOTES
Patient ID: Easton Malik is a 77 y o  female  HPI: 77 y  o female presents for follow up of NIDDM, hypercholesterolemia, and hypertension  She needs a flu shot  SUBJECTIVE    Family History   Problem Relation Age of Onset    Depression Maternal Uncle     Diabetes Maternal Uncle      Social History     Social History    Marital status: Single     Spouse name: N/A    Number of children: N/A    Years of education: completed 12th grade     Occupational History    Not on file       Social History Main Topics    Smoking status: Never Smoker    Smokeless tobacco: Not on file    Alcohol use Yes      Comment: social    Drug use: No    Sexual activity: Not on file     Other Topics Concern    Not on file     Social History Narrative    Daily coffee consumption: ____cp/day    Daily cola consumption: ___ can/day    daily tea consumption: _____cp/day         Past Medical History:   Diagnosis Date    Anxiety     Depression     Hyperlipidemia     Stroke ([facility] [address] )      Past Surgical History:   Procedure Laterality Date    EYE SURGERY      cosmetic     No Known Allergies    Current Outpatient Prescriptions:     ALPRAZolam (XANAX) 0 25 mg tablet, Take 1 tablet (0 25 mg total) by mouth 3 (three) times a day as needed for anxiety, Disp: 90 tablet, Rfl: 3    citalopram (CeleXA) 20 mg tablet, Take one and one half of a tablet daily (30 mg total ), Disp: 45 tablet, Rfl: 3    levothyroxine 25 mcg tablet, Take 1 tablet (25 mcg total) by mouth daily, Disp: 90 tablet, Rfl: 3    metoprolol succinate (TOPROL-XL) 25 mg 24 hr tablet, Take 1 tablet by mouth daily, Disp: , Rfl:     Review of Systems  Constitutional:     Denies fever, chills ,fatigue ,weakness ,weight loss, weight gain     ENT: Denies earache ,loss of hearing ,nosebleed, nasal discharge,nasal congestion ,sore throat ,hoarseness  Pulmonary: Denies shortness of breath ,cough  ,dyspnea on exertion, orthopnea  ,PND   Cardiovascular:  Denies bradycardia , tachycardia  ,palpations, lower extremity edema leg, claudication  Breast:  Denies new or changing breast lumps ,nipple discharge ,nipple changes  Abdomen:  Denies abdominal pain , anorexia , indigestion, nausea, vomiting, constipation, diarrhea  Musculoskeletal: Denies myalgias, arthralgias, joint swelling, joint stiffness , limb pain, limb swelling  Gu: denies dysuria, polyuria  Skin: Denies skin rash, skin lesion, skin wound, itching, dry skin  Neuro: Denies headache, numbness, tingling, confusion, loss of consciousness, dizziness, vertigo  Psychiatric: Denies feelings of depression, suicidal ideation, anxiety, sleep disturbances    OBJECTIVE  /82   Pulse 72   Temp 98 1 °F (36 7 °C)   Constitutional:   NAD, well appearing and well nourished      ENT:   Conjunctiva and lids: no injection, edema, or discharge     Pupils and iris: HENRY bilaterally    External inspection of ears and nose: normal without deformities or discharge  Otoscopic exam: TM occluded with cerumen and removed with a currette bilat until tm fully visulaized and intactNasal mucosa, septum and turbinates: Normal or edema or discharge         Oropharynx:  Moist mucosa, normal tongue and tonsils without lesions  No erythema        Pulmonary:Respiratory effort normal rate and rhythm, no increased work of breathing   Auscultation of lungs:  Clear bilaterally with no adventitious breath sounds       Cardiovascular: regular rate and rhythm, S1 and S2, no murmur, no edema and/or varicosities of LE      Abdomen: Soft and non-distended     Positive bowel sounds      No heptomegaly or splenomegaly      Gu: no suprapubic tenderness or CVA tenderness, no urethral discharge  Lymphatic:  No anterior or posterior cervical lymphadenopathy         Musculoskeletal:  Gait and station: Normal gait      Digits and nails normal without clubbing or cyanosis       Inspection/palpation of joints, bones, and muscles:  No joint tenderness, swelling, full active and passive range of motion       Skin: Normal skin turgor and no rashes      Neuro:   Normal reflexes       Psych:   alert and oriented to person, place and time     normal mood and affect   Patient's shoes and socks removed  Right Foot/Ankle   Right Foot Inspection  Skin Exam: skin normal and skin intact no dry skin, no warmth, no callus, no erythema, no maceration, no abnormal color, no pre-ulcer, no ulcer and no callus                          Toe Exam: ROM and strength within normal limitsno swelling, no tenderness, erythema and  no right toe deformity  Sensory   Vibration: intact  Proprioception: intact   Monofilament testing: intact  Vascular  Capillary refills: < 3 seconds  The right DP pulse is 2+  The right PT pulse is 2+  Left Foot/Ankle  Left Foot Inspection  Skin Exam: skin normal and skin intactno dry skin, no warmth, no erythema, no maceration, normal color, no pre-ulcer, no ulcer and no callus                         Toe Exam: ROM and strength within normal limitsno swelling, no tenderness, no erythema and no left toe deformity                   Sensory   Vibration: intact  Proprioception: intact  Monofilament: intact  Vascular  Capillary refills: < 3 seconds  The left DP pulse is 2+  The left PT pulse is 2+  Assign Risk Category:  No deformity present; No loss of protective sensation; No weak pulses       Risk: 0  Ear cerumen removal  Date/Time: 11/20/2018 7:44 AM  Performed by: Helen Poster  Authorized by: Brandi Fox, [address] [place] [address]     Patient location:  Clinic  Consent:     Consent obtained:  Verbal    Consent given by:  Patient    Risks discussed:  Bleeding, dizziness and infection    Alternatives discussed:  No treatment  Procedure details:     Location:  L ear and R ear    Procedure type: curette      Approach:  External  Post-procedure details:     Complication:  None    Hearing quality:  Normal    Patient tolerance of procedure:   Tolerated well, no immediate complications        Assessment/Plan:Diagnoses and all orders for this visit:    [facility] 1 5[facility]  -     HEMOGLOBIN A1C W/ EAG ESTIMATION; Future    Hypercholesterolemia  -     Lipid Panel with Direct LDL reflex; Future    Essential hypertension  -     Comprehensive metabolic panel; Future    Hypothyroidism, unspecified type  -     TSH, 3rd generation; Future    Bilateral impacted cerumen    Need for vaccination  -     influenza vaccine, 1191-9283, high-dose, PF 0 5 mL, for patients 65 yr+ (FLUZONE HIGH-DOSE)        Reviewed with patient plan to treat with above plan      Patient instructed to call in 72 hours if not feeling better or if symptoms worsen

## 2019-01-04 ENCOUNTER — TELEPHONE (OUTPATIENT)
Dept: FAMILY MEDICINE CLINIC | Facility: CLINIC | Age: 69
End: 2019-01-04

## 2019-02-08 DIAGNOSIS — I10 ESSENTIAL HYPERTENSION: Primary | ICD-10-CM

## 2019-02-08 DIAGNOSIS — F32.A DEPRESSION, UNSPECIFIED DEPRESSION TYPE: ICD-10-CM

## 2019-02-08 RX ORDER — METOPROLOL SUCCINATE 25 MG/1
25 TABLET, EXTENDED RELEASE ORAL DAILY
Qty: 30 TABLET | Refills: 3 | Status: SHIPPED | OUTPATIENT
Start: 2019-02-08 | End: 2019-06-19 | Stop reason: SDUPTHER

## 2019-02-08 RX ORDER — CITALOPRAM 20 MG/1
TABLET ORAL
Qty: 45 TABLET | Refills: 3 | Status: SHIPPED | OUTPATIENT
Start: 2019-02-08 | End: 2019-06-19 | Stop reason: SDUPTHER

## 2019-03-13 DIAGNOSIS — Z13.5 SCREENING FOR DIABETIC RETINOPATHY: Primary | ICD-10-CM

## 2019-03-19 ENCOUNTER — TELEPHONE (OUTPATIENT)
Dept: FAMILY MEDICINE CLINIC | Facility: CLINIC | Age: 69
End: 2019-03-19

## 2019-03-19 LAB
ALBUMIN SERPL-MCNC: 4.2 G/DL (ref 3.6–5.1)
ALBUMIN/GLOB SERPL: 1.7 (CALC) (ref 1–2.5)
ALP SERPL-CCNC: 59 U/L (ref 33–130)
ALT SERPL-CCNC: 55 U/L (ref 6–29)
AST SERPL-CCNC: 49 U/L (ref 10–35)
BILIRUB SERPL-MCNC: 0.5 MG/DL (ref 0.2–1.2)
BUN SERPL-MCNC: 11 MG/DL (ref 7–25)
BUN/CREAT SERPL: ABNORMAL (CALC) (ref 6–22)
CALCIUM SERPL-MCNC: 9.3 MG/DL (ref 8.6–10.4)
CHLORIDE SERPL-SCNC: 105 MMOL/L (ref 98–110)
CHOLEST SERPL-MCNC: 223 MG/DL
CHOLEST/HDLC SERPL: 6.6 (CALC)
CO2 SERPL-SCNC: 28 MMOL/L (ref 20–32)
CREAT SERPL-MCNC: 0.82 MG/DL (ref 0.6–0.93)
EST. AVERAGE GLUCOSE BLD GHB EST-MCNC: 120 (CALC)
EST. AVERAGE GLUCOSE BLD GHB EST-SCNC: 6.6 (CALC)
GLOBULIN SER CALC-MCNC: 2.5 G/DL (CALC) (ref 1.9–3.7)
GLUCOSE SERPL-MCNC: 93 MG/DL (ref 65–99)
HBA1C MFR BLD: 5.8 % OF TOTAL HGB
HDLC SERPL-MCNC: 34 MG/DL
LDLC SERPL CALC-MCNC: 148 MG/DL (CALC)
NONHDLC SERPL-MCNC: 189 MG/DL (CALC)
POTASSIUM SERPL-SCNC: 4.6 MMOL/L (ref 3.5–5.3)
PROT SERPL-MCNC: 6.7 G/DL (ref 6.1–8.1)
SL AMB EGFR AFRICAN AMERICAN: 84 ML/MIN/1.73M2
SL AMB EGFR NON AFRICAN AMERICAN: 72 ML/MIN/1.73M2
SODIUM SERPL-SCNC: 139 MMOL/L (ref 135–146)
TRIGL SERPL-MCNC: 264 MG/DL
TSH SERPL-ACNC: 1.24 MIU/L (ref 0.4–4.5)

## 2019-03-19 NOTE — TELEPHONE ENCOUNTER
----- Message from Chuck Wilder DO sent at 3/19/2019  9:00 AM EDT -----  Her cholesterol is up; I'd like her to take fish oil 1 bid

## 2019-04-03 ENCOUNTER — OFFICE VISIT (OUTPATIENT)
Dept: FAMILY MEDICINE CLINIC | Facility: CLINIC | Age: 69
End: 2019-04-03
Payer: MEDICARE

## 2019-04-03 VITALS — HEART RATE: 74 BPM | SYSTOLIC BLOOD PRESSURE: 122 MMHG | DIASTOLIC BLOOD PRESSURE: 82 MMHG | TEMPERATURE: 98.7 F

## 2019-04-03 DIAGNOSIS — E78.00 HYPERCHOLESTEROLEMIA: ICD-10-CM

## 2019-04-03 DIAGNOSIS — E03.9 HYPOTHYROIDISM, UNSPECIFIED TYPE: ICD-10-CM

## 2019-04-03 DIAGNOSIS — R06.2 WHEEZING: ICD-10-CM

## 2019-04-03 DIAGNOSIS — E13.9 DIABETES 1.5, MANAGED AS TYPE 2 (HCC): Primary | ICD-10-CM

## 2019-04-03 DIAGNOSIS — I10 ESSENTIAL HYPERTENSION: ICD-10-CM

## 2019-04-03 PROCEDURE — 99214 OFFICE O/P EST MOD 30 MIN: CPT | Performed by: FAMILY MEDICINE

## 2019-04-03 RX ORDER — METHYLPREDNISOLONE 4 MG/1
TABLET ORAL
Qty: 21 EACH | Refills: 0 | Status: SHIPPED | OUTPATIENT
Start: 2019-04-03 | End: 2019-08-26 | Stop reason: ALTCHOICE

## 2019-04-18 DIAGNOSIS — E03.9 HYPOTHYROIDISM, UNSPECIFIED TYPE: ICD-10-CM

## 2019-04-18 RX ORDER — LEVOTHYROXINE SODIUM 0.03 MG/1
25 TABLET ORAL DAILY
Qty: 90 TABLET | Refills: 3 | Status: SHIPPED | OUTPATIENT
Start: 2019-04-18 | End: 2020-04-10

## 2019-06-19 DIAGNOSIS — F32.A DEPRESSION, UNSPECIFIED DEPRESSION TYPE: ICD-10-CM

## 2019-06-19 DIAGNOSIS — I10 ESSENTIAL HYPERTENSION: ICD-10-CM

## 2019-06-19 RX ORDER — METOPROLOL SUCCINATE 25 MG/1
25 TABLET, EXTENDED RELEASE ORAL DAILY
Qty: 90 TABLET | Refills: 3 | Status: SHIPPED | OUTPATIENT
Start: 2019-06-19 | End: 2020-05-07

## 2019-06-19 RX ORDER — CITALOPRAM 20 MG/1
TABLET ORAL
Qty: 45 TABLET | Refills: 3 | Status: SHIPPED | OUTPATIENT
Start: 2019-06-19 | End: 2019-10-23 | Stop reason: SDUPTHER

## 2019-07-03 DIAGNOSIS — Z12.39 SCREENING FOR BREAST CANCER: Primary | ICD-10-CM

## 2019-07-19 PROBLEM — E11.9 TYPE 2 DIABETES MELLITUS WITHOUT COMPLICATION, WITHOUT LONG-TERM CURRENT USE OF INSULIN (HCC): Status: ACTIVE | Noted: 2019-07-19

## 2019-08-21 LAB
ALBUMIN SERPL-MCNC: 4.4 G/DL (ref 3.6–5.1)
ALBUMIN/GLOB SERPL: 1.7 (CALC) (ref 1–2.5)
ALP SERPL-CCNC: 56 U/L (ref 33–130)
ALT SERPL-CCNC: 95 U/L (ref 6–29)
AST SERPL-CCNC: 76 U/L (ref 10–35)
BILIRUB SERPL-MCNC: 0.6 MG/DL (ref 0.2–1.2)
BUN SERPL-MCNC: 16 MG/DL (ref 7–25)
BUN/CREAT SERPL: 16 (CALC) (ref 6–22)
CALCIUM SERPL-MCNC: 9.6 MG/DL (ref 8.6–10.4)
CHLORIDE SERPL-SCNC: 105 MMOL/L (ref 98–110)
CHOLEST SERPL-MCNC: 213 MG/DL
CHOLEST/HDLC SERPL: 6.5 (CALC)
CO2 SERPL-SCNC: 28 MMOL/L (ref 20–32)
CREAT SERPL-MCNC: 1 MG/DL (ref 0.6–0.93)
EST. AVERAGE GLUCOSE BLD GHB EST-MCNC: 117 (CALC)
EST. AVERAGE GLUCOSE BLD GHB EST-SCNC: 6.5 (CALC)
GLOBULIN SER CALC-MCNC: 2.6 G/DL (CALC) (ref 1.9–3.7)
GLUCOSE SERPL-MCNC: 92 MG/DL (ref 65–99)
HBA1C MFR BLD: 5.7 % OF TOTAL HGB
HDLC SERPL-MCNC: 33 MG/DL
LDLC SERPL CALC-MCNC: 143 MG/DL (CALC)
NONHDLC SERPL-MCNC: 180 MG/DL (CALC)
POTASSIUM SERPL-SCNC: 4.2 MMOL/L (ref 3.5–5.3)
PROT SERPL-MCNC: 7 G/DL (ref 6.1–8.1)
SL AMB EGFR AFRICAN AMERICAN: 66 ML/MIN/1.73M2
SL AMB EGFR NON AFRICAN AMERICAN: 57 ML/MIN/1.73M2
SODIUM SERPL-SCNC: 140 MMOL/L (ref 135–146)
TRIGL SERPL-MCNC: 229 MG/DL
TSH SERPL-ACNC: 1.46 MIU/L (ref 0.4–4.5)

## 2019-08-26 ENCOUNTER — OFFICE VISIT (OUTPATIENT)
Dept: FAMILY MEDICINE CLINIC | Facility: CLINIC | Age: 69
End: 2019-08-26
Payer: MEDICARE

## 2019-08-26 VITALS
BODY MASS INDEX: 34.96 KG/M2 | SYSTOLIC BLOOD PRESSURE: 118 MMHG | HEART RATE: 72 BPM | DIASTOLIC BLOOD PRESSURE: 78 MMHG | TEMPERATURE: 98.1 F | HEIGHT: 60 IN | WEIGHT: 178.06 LBS

## 2019-08-26 DIAGNOSIS — I10 ESSENTIAL HYPERTENSION: ICD-10-CM

## 2019-08-26 DIAGNOSIS — Z12.11 COLON CANCER SCREENING: ICD-10-CM

## 2019-08-26 DIAGNOSIS — E03.9 HYPOTHYROIDISM, UNSPECIFIED TYPE: ICD-10-CM

## 2019-08-26 DIAGNOSIS — Z13.820 OSTEOPOROSIS SCREENING: ICD-10-CM

## 2019-08-26 DIAGNOSIS — Z11.59 ENCOUNTER FOR HEPATITIS C SCREENING TEST FOR LOW RISK PATIENT: ICD-10-CM

## 2019-08-26 DIAGNOSIS — Z00.00 MEDICARE ANNUAL WELLNESS VISIT, SUBSEQUENT: Primary | ICD-10-CM

## 2019-08-26 DIAGNOSIS — R42 VERTIGO: ICD-10-CM

## 2019-08-26 DIAGNOSIS — E78.00 HYPERCHOLESTEROLEMIA: ICD-10-CM

## 2019-08-26 DIAGNOSIS — E13.9 DIABETES 1.5, MANAGED AS TYPE 2 (HCC): ICD-10-CM

## 2019-08-26 PROCEDURE — 99214 OFFICE O/P EST MOD 30 MIN: CPT | Performed by: FAMILY MEDICINE

## 2019-08-26 PROCEDURE — G0439 PPPS, SUBSEQ VISIT: HCPCS | Performed by: FAMILY MEDICINE

## 2019-08-26 RX ORDER — MECLIZINE HCL 12.5 MG/1
12.5 TABLET ORAL 3 TIMES DAILY PRN
Qty: 30 TABLET | Refills: 0 | Status: SHIPPED | OUTPATIENT
Start: 2019-08-26 | End: 2020-01-07 | Stop reason: ALTCHOICE

## 2019-08-26 RX ORDER — BIOTIN 5 MG
2 TABLET ORAL DAILY
Qty: 60 CAPSULE | Refills: 3
Start: 2019-08-26 | End: 2021-04-13 | Stop reason: ALTCHOICE

## 2019-08-26 NOTE — PATIENT INSTRUCTIONS
Obesity   AMBULATORY CARE:   Obesity  is when your body mass index (BMI) is greater than 30  Your healthcare provider will use your height and weight to measure your BMI  The risks of obesity include  many health problems, such as injuries or physical disability  You may need tests to check for the following:  · Diabetes     · High blood pressure or high cholesterol     · Heart disease     · Gallbladder or liver disease     · Cancer of the colon, breast, prostate, liver, or kidney     · Sleep apnea     · Arthritis or gout  Seek care immediately if:   · You have a severe headache, confusion, or difficulty speaking  · You have weakness on one side of your body  · You have chest pain, sweating, or shortness of breath  Contact your healthcare provider if:   · You have symptoms of gallbladder or liver disease, such as pain in your upper abdomen  · You have knee or hip pain and discomfort while walking  · You have symptoms of diabetes, such as intense hunger and thirst, and frequent urination  · You have symptoms of sleep apnea, such as snoring or daytime sleepiness  · You have questions or concerns about your condition or care  Treatment for obesity  focuses on helping you lose weight to improve your health  Even a small decrease in BMI can reduce the risk for many health problems  Your healthcare provider will help you set a weight-loss goal   · Lifestyle changes  are the first step in treating obesity  These include making healthy food choices and getting regular physical activity  Your healthcare provider may suggest a weight-loss program that involves coaching, education, and therapy  · Medicine  may help you lose weight when it is used with a healthy diet and physical activity  · Surgery  can help you lose weight if you are very obese and have other health problems  There are several types of weight-loss surgery  Ask your healthcare provider for more information    Be successful losing weight:   · Set small, realistic goals  An example of a small goal is to walk for 20 minutes 5 days a week  Anther goal is to lose 5% of your body weight  · Tell friends, family members, and coworkers about your goals  and ask for their support  Ask a friend to lose weight with you, or join a weight-loss support group  · Identify foods or triggers that may cause you to overeat , and find ways to avoid them  Remove tempting high-calorie foods from your home and workplace  Place a bowl of fresh fruit on your kitchen counter  If stress causes you to eat, then find other ways to cope with stress  · Keep a diary to track what you eat and drink  Also write down how many minutes of physical activity you do each day  Weigh yourself once a week and record it in your diary  Eating changes: You will need to eat 500 to 1,000 fewer calories each day than you currently eat to lose 1 to 2 pounds a week  The following changes will help you cut calories:  · Eat smaller portions  Use small plates, no larger than 9 inches in diameter  Fill your plate half full of fruits and vegetables  Measure your food using measuring cups until you know what a serving size looks like  · Eat 3 meals and 1 or 2 snacks each day  Plan your meals in advance  Erin More and eat at home most of the time  Eat slowly  · Eat fruits and vegetables at every meal   They are low in calories and high in fiber, which makes you feel full  Do not add butter, margarine, or cream sauce to vegetables  Use herbs to season steamed vegetables  · Eat less fat and fewer fried foods  Eat more baked or grilled chicken and fish  These protein sources are lower in calories and fat than red meat  Limit fast food  Dress your salads with olive oil and vinegar instead of bottled dressing  · Limit the amount of sugar you eat  Do not drink sugary beverages  Limit alcohol  Activity changes:  Physical activity is good for your body in many ways   It helps you burn calories and build strong muscles  It decreases stress and depression, and improves your mood  It can also help you sleep better  Talk to your healthcare provider before you begin an exercise program   · Exercise for at least 30 minutes 5 days a week  Start slowly  Set aside time each day for physical activity that you enjoy and that is convenient for you  It is best to do both weight training and an activity that increases your heart rate, such as walking, bicycling, or swimming  · Find ways to be more active  Do yard work and housecleaning  Walk up the stairs instead of using elevators  Spend your leisure time going to events that require walking, such as outdoor festivals or fairs  This extra physical activity can help you lose weight and keep it off  Follow up with your healthcare provider as directed: You may need to meet with a dietitian  Write down your questions so you remember to ask them during your visits  © 2017 [address] [employer] Information is for End User's use only and may not be sold, redistributed or otherwise used for commercial purposes  All illustrations and images included in CareNotes® are the copyrighted property of [employer]  or Mike Ward  The above information is an  only  It is not intended as medical advice for individual conditions or treatments  Talk to your doctor, nurse or pharmacist before following any medical regimen to see if it is safe and effective for you  Urinary Incontinence   WHAT YOU NEED TO KNOW:   What is urinary incontinence? Urinary incontinence (UI) is when you lose control of your bladder  What causes UI? UI occurs because your bladder cannot store or empty urine properly  The following are the most common types of UI:  · Stress incontinence  is when you leak urine due to increased bladder pressure  This may happen when you cough, sneeze, or exercise       · Urge incontinence  is when you feel the need to urinate right away and leak urine accidentally  · Mixed incontinence  is when you have both stress and urge UI  What are the signs and symptoms of UI?   · You feel like your bladder does not empty completely when you urinate  · You urinate often and need to urinate immediately  · You leak urine when you sleep, or you wake up with the urge to urinate  · You leak urine when you cough, sneeze, exercise, or laugh  How is UI diagnosed? Your healthcare provider will ask how often you leak urine and whether you have stress or urge symptoms  Tell him which medicines you take, how often you urinate, and how much liquid you drink each day  You may need any of the following tests:  · Urine tests  may show infection or kidney function  · A pelvic exam  may be done to check for blockages  A pelvic exam will also show if your bladder, uterus, or other organs have moved out of place  · An x-ray, ultrasound, or CT  may show problems with parts of your urinary system  You may be given contrast liquid to help your organs show up better in the pictures  Tell the healthcare provider if you have ever had an allergic reaction to contrast liquid  Do not enter the MRI room with anything metal  Metal can cause serious injury  Tell the healthcare provider if you have any metal in or on your body  · A bladder scan  will show how much urine is left in your bladder after you urinate  You will be asked to urinate and then healthcare providers will use a small ultrasound machine to check the urine left in your bladder  · Cystometry  is used to check the function of your urinary system  Your healthcare provider checks the pressure in your bladder while filling it with fluid  Your bladder pressure may also be tested when your bladder is full and while you urinate  How is UI treated? · Medicines  can help strengthen your bladder control      · Electrical stimulation  is used to send a small amount of electrical energy to your pelvic floor muscles  This helps control your bladder function  Electrodes may be placed outside your body or in your rectum  For women, the electrodes may be placed in the vagina  · A bulking agent  may be injected into the wall of your urethra to make it thicker  This helps keep your urethra closed and decreases urine leakage  · Devices  such as a clamp, pessary, or tampon may help stop urine leaks  Ask your healthcare provider for more information about these and other devices  · Surgery  may be needed if other treatments do not work  Several types of surgery can help improve your bladder control  Ask your healthcare provider for more information about the surgery you may need  How can I manage my symptoms? · Do pelvic muscle exercises often  Your pelvic muscles help you stop urinating  Squeeze these muscles tight for 5 seconds, then relax for 5 seconds  Gradually work up to squeezing for 10 seconds  Do 3 sets of 15 repetitions a day, or as directed  This will help strengthen your pelvic muscles and improve bladder control  · A catheter  may be used to help empty your bladder  A catheter is a tiny, plastic tube that is put into your bladder to drain your urine  Your healthcare provider may tell you to use a catheter to prevent your bladder from getting too full and leaking urine  · Keep a UI record  Write down how often you leak urine and how much you leak  Make a note of what you were doing when you leaked urine  · Train your bladder  Go to the bathroom at set times, such as every 2 hours, even if you do not feel the urge to go  You can also try to hold your urine when you feel the urge to go  For example, hold your urine for 5 minutes when you feel the urge to go  As that becomes easier, hold your urine for 10 minutes  · Drink liquids as directed  Ask your healthcare provider how much liquid to drink each day and which liquids are best for you   You may need to limit the amount of liquid you drink to help control your urine leakage  Limit or do not have drinks that contain caffeine or alcohol  Do not drink any liquid right before you go to bed  · Prevent constipation  Eat a variety of high-fiber foods  Good examples are high-fiber cereals, beans, vegetables, and whole-grain breads  Prune juice may help make your bowel movement softer  Walking is the best way to trigger your intestines to have a bowel movement  · Exercise regularly and maintain a healthy weight  Ask your healthcare provider how much you should weigh and about the best exercise plan for you  Weight loss and exercise will decrease pressure on your bladder and help you control your leakage  Ask him to help you create a weight loss plan if you are overweight  When should I seek immediate care? · You have severe pain  · You are confused or cannot think clearly  When should I contact my healthcare provider? · You have a fever  · You see blood in your urine  · You have pain when you urinate  · You have new or worse pain, even after treatment  · Your mouth feels dry or you have vision changes  · Your urine is cloudy or smells bad  · You have questions or concerns about your condition or care  CARE AGREEMENT:   You have the right to help plan your care  Learn about your health condition and how it may be treated  Discuss treatment options with your caregivers to decide what care you want to receive  You always have the right to refuse treatment  The above information is an  only  It is not intended as medical advice for individual conditions or treatments  Talk to your doctor, nurse or pharmacist before following any medical regimen to see if it is safe and effective for you  © 2017 [address] [employer] Information is for End User's use only and may not be sold, redistributed or otherwise used for commercial purposes   All illustrations and images included in CareNotes® are the copyrighted property of [employer]  or Mike Ward  Cigarette Smoking and Your Health   AMBULATORY CARE:   Risks to your health if you smoke:  Nicotine and other chemicals found in tobacco damage every cell in your body  Even if you are a light smoker, you have an increased risk for cancer, heart disease, and lung disease  If you are pregnant or have diabetes, smoking increases your risk for complications  Benefits to your health if you stop smoking:   · You decrease respiratory symptoms such as coughing, wheezing, and shortness of breath  · You reduce your risk for cancers of the lung, mouth, throat, kidney, bladder, pancreas, stomach, and cervix  If you already have cancer, you increase the benefits of chemotherapy  You also reduce your risk for cancer returning or a second cancer from developing  · You reduce your risk for heart disease, blood clots, heart attack, and stroke  · You reduce your risk for lung infections, and diseases such as pneumonia, asthma, chronic bronchitis, and emphysema  · Your circulation improves  More oxygen can be delivered to your body  If you have diabetes, you lower your risk for complications, such as kidney, artery, and eye diseases  You also lower your risk for nerve damage  Nerve damage can lead to amputations, poor vision, and blindness  · You improve your body's ability to heal and to fight infections  Benefits to the health of others if you stop smoking:  Tobacco is harmful to nonsmokers who breathe in your secondhand smoke  The following are ways the health of others around you may improve when you stop smoking:  · You lower the risks for lung cancer and heart disease in nonsmoking adults  · If you are pregnant, you lower the risk for miscarriage, early delivery, low birth weight, and stillbirth  You also lower your baby's risk for SIDS, obesity, developmental delay, and neurobehavioral problems, such as ADHD  · If you have children, you lower their risk for ear infections, colds, pneumonia, bronchitis, and asthma  For more information and support to stop smoking:   · [employer]  gov  Phone: 2- 669 - 180-4951  Web Address: www smokefree  gov  Follow up with your healthcare provider as directed:  Write down your questions so you remember to ask them during your visits  © 2017 [address] [employer] Information is for End User's use only and may not be sold, redistributed or otherwise used for commercial purposes  All illustrations and images included in CareNotes® are the copyrighted property of [employer]  or Mike Ward  The above information is an  only  It is not intended as medical advice for individual conditions or treatments  Talk to your doctor, nurse or pharmacist before following any medical regimen to see if it is safe and effective for you  Fall Prevention   AMBULATORY CARE:   Fall prevention  includes ways to make your home and other areas safer  It also includes ways you can move more carefully to prevent a fall  Health conditions that cause changes in your blood pressure, vision, or muscle strength and coordination may increase your risk for falls  Medicines may also increase your risk for falls if they make you dizzy, weak, or sleepy  Call 911 or have someone else call if:   · You have fallen and are unconscious  · You have fallen and cannot move part of your body  Contact your healthcare provider if:   · You have fallen and have pain or a headache  · You have questions or concerns about your condition or care  Fall prevention tips:   · Stand or sit up slowly  This may help you keep your balance and prevent falls  · Use assistive devices as directed  Your healthcare provider may suggest that you use a cane or walker to help you keep your balance  You may need to have grab bars put in your bathroom near the toilet or in the shower      · Wear shoes that fit well and have soles that   Wear shoes both inside and outside  Use slippers with good   Do not wear shoes with high heels  · Wear a personal alarm  This is a device that allows you to call 911 if you fall and need help  Ask your healthcare provider for more information  · Stay active  Exercise can help strengthen your muscles and improve your balance  Your healthcare provider may recommend water aerobics or walking  He or she may also recommend physical therapy to improve your coordination  Never start an exercise program without talking to your healthcare provider first      · Manage your medical conditions  Keep all appointments with your healthcare providers  Visit your eye doctor as directed  Home safety tips:   · Add items to prevent falls in the bathroom  Put nonslip strips on your bath or shower floor to prevent you from slipping  Use a bath mat if you do not have carpet in the bathroom  This will prevent you from falling when you step out of the bath or shower  Use a shower seat so you do not need to stand while you shower  Sit on the toilet or a chair in your bathroom to dry yourself and put on clothing  This will prevent you from losing your balance from drying or dressing yourself while you are standing  · Keep paths clear  Remove books, shoes, and other objects from walkways and stairs  Place cords for telephones and lamps out of the way so that you do not need to walk over them  Tape them down if you cannot move them  Remove small rugs  If you cannot remove a rug, secure it with double-sided tape  This will prevent you from tripping  · Install bright lights in your home  Use night lights to help light paths to the bathroom or kitchen  Always turn on the light before you start walking  · Keep items you use often on shelves within reach  Do not use a step stool to help you reach an item  · Paint or place reflective tape on the edges of your stairs    This will help you see the stairs better  Follow up with your healthcare provider as directed:  Write down your questions so you remember to ask them during your visits  © 2017 [address] [employer] Information is for End User's use only and may not be sold, redistributed or otherwise used for commercial purposes  All illustrations and images included in CareNotes® are the copyrighted property of [employer]  or Mike Ward  The above information is an  only  It is not intended as medical advice for individual conditions or treatments  Talk to your doctor, nurse or pharmacist before following any medical regimen to see if it is safe and effective for you  Advance Directives   WHAT YOU NEED TO KNOW:   What are advance directives? Advance directives are legal documents that state your wishes and plans for medical care  These plans are made ahead of time in case you lose your ability to make decisions for yourself  Advance directives can apply to any medical decision, such as the treatments you want, and if you want to donate organs  What are the types of advance directives? There are many types of advance directives, and each state has rules about how to use them  You may choose a combination of any of the following:  · Living will: This is a written record of the treatment you want  You can also choose which treatments you do not want, which to limit, and which to stop at a certain time  This includes surgery, medicine, IV fluid, and tube feedings  · Durable power of  for healthcare [facility]): This is a written record that states who you want to make healthcare choices for you when you are unable to make them for yourself  This person, called a proxy, is usually a family member or a friend  You may choose more than 1 proxy  · Do not resuscitate (DNR) order:  A DNR order is used in case your heart stops beating or you stop breathing   It is a request not to have certain forms of treatment, such as CPR  A DNR order may be included in other types of advance directives  · Medical directive: This covers the care that you want if you are in a coma, near death, or unable to make decisions for yourself  You can list the treatments you want for each condition  Treatment may include pain medicine, surgery, blood transfusions, dialysis, IV or tube feedings, and a ventilator (breathing machine)  · Values history: This document has questions about your views, beliefs, and how you feel and think about life  This information can help others choose the care that you would choose  Why are advance directives important? An advance directive helps you control your care  Although spoken wishes may be used, it is better to have your wishes written down  Spoken wishes can be misunderstood, or not followed  Treatments may be given even if you do not want them  An advance directive may make it easier for your family to make difficult choices about your care  How do I decide what to put in my advance directives? · Make informed decisions:  Make sure you fully understand treatments or care you may receive  Think about the benefits and problems your decisions could cause for you or your family  Talk to healthcare providers if you have concerns or questions before you write down your wishes  You may also want to talk with your Hinduism or , or a   Check your state laws to make sure that what you put in your advance directive is legal      · Sign all forms:  Sign and date your advance directive when you have finished  You may also need 2 witnesses to sign the forms  Witnesses cannot be your doctor or his staff, your spouse, heirs or beneficiaries, people you owe money to, or your chosen proxy  Talk to your family, proxy, and healthcare providers about your advance directive  Give each person a copy, and keep one for yourself in a place you can get to easily   Do not keep it hidden or locked away  · Review and revise your plans: You can revise your advance directive at any time, as long as you are able to make decisions  Review your plan every year, and when there are changes in your life, or your health  When you make changes, let your family, proxy, and healthcare providers know  Give each a new copy  Where can I find more information? · [employer]  [address] [place] , [place] [address]  Phone: 5- [phone] - [phone]  Phone: 4- [phone] - [phone]  Web Address: http://www  aafp org  · [address] [facility])  [address] [address] [facility], [address]  Phone: [phone]  Phone: [phone]  Web Address: Mis mcdonough  CARE AGREEMENT:   You have the right to help plan your care  To help with this plan, you must learn about your health condition and treatment options  You must also learn about advance directives and how they are used  Work with your healthcare providers to decide what care will be used to treat you  You always have the right to refuse treatment  The above information is an  only  It is not intended as medical advice for individual conditions or treatments  Talk to your doctor, nurse or pharmacist before following any medical regimen to see if it is safe and effective for you  © 2017 [address] [employer] Information is for End User's use only and may not be sold, redistributed or otherwise used for commercial purposes  All illustrations and images included in CareNotes® are the copyrighted property of [employer]  or Mike Ward

## 2019-08-26 NOTE — PROGRESS NOTES
Patient ID: Calderon Lyman is a 77 y o  female  HPI: 77 y  o female presents for follow up for niddm(diet controlled), hypertension and hypercholesterolemia  All are well controlled  Her hgba1c is good at 5 7        SUBJECTIVE    Family History   Problem Relation Age of Onset    Depression Maternal Uncle     Diabetes Maternal Uncle      Social History     Socioeconomic History    Marital status: Single     Spouse name: Not on file    Number of children: Not on file    Years of education: completed 12th grade    Highest education level: Not on file   Occupational History    Not on file   Social Needs    Financial resource strain: Not on file    Food insecurity:     Worry: Not on file     Inability: Not on file    Transportation needs:     Medical: Not on file     Non-medical: Not on file   Tobacco Use    Smoking status: Never Smoker    Smokeless tobacco: Never Used   Substance and Sexual Activity    Alcohol use: Yes     Comment: social    Drug use: Never    Sexual activity: Not on file   Lifestyle    Physical activity:     Days per week: Not on file     Minutes per session: Not on file    Stress: Not on file   Relationships    Social connections:     Talks on phone: Not on file     Gets together: Not on file     Attends Yazidi service: Not on file     Active member of club or organization: Not on file     Attends meetings of clubs or organizations: Not on file     Relationship status: Not on file    Intimate partner violence:     Fear of current or ex partner: Not on file     Emotionally abused: Not on file     Physically abused: Not on file     Forced sexual activity: Not on file   Other Topics Concern    Not on file   Social History Narrative    Daily coffee consumption: ____cp/day    Daily cola consumption: ___ can/day    daily tea consumption: _____cp/day     Past Medical History:   Diagnosis Date    Anxiety     Depression     Hyperlipidemia     Stroke [facility])      Past Surgical History:   Procedure Laterality Date    EYE SURGERY      cosmetic     No Known Allergies    Current Outpatient Medications:     ALPRAZolam (XANAX) 0 25 mg tablet, Take 1 tablet (0 25 mg total) by mouth 3 (three) times a day as needed for anxiety, Disp: 90 tablet, Rfl: 3    citalopram (CeleXA) 20 mg tablet, Take one and one half of a tablet daily (30 mg total ), Disp: 45 tablet, Rfl: 3    levothyroxine 25 mcg tablet, Take 1 tablet (25 mcg total) by mouth daily, Disp: 90 tablet, Rfl: 3    metoprolol succinate (TOPROL-XL) 25 mg 24 hr tablet, Take 1 tablet (25 mg total) by mouth daily, Disp: 90 tablet, Rfl: 3    Krill Oil 1000 MG CAPS, Take 2 capsules (2,000 mg total) by mouth daily, Disp: 60 capsule, Rfl: 3    meclizine (ANTIVERT) 12 5 MG tablet, Take 1 tablet (12 5 mg total) by mouth 3 (three) times a day as needed for dizziness, Disp: 30 tablet, Rfl: 0    Review of Systems  Constitutional:     Denies fever, chills ,fatigue ,weakness ,weight loss, weight gain     ENT: Denies earache ,loss of hearing ,nosebleed, nasal discharge,nasal congestion ,sore throat ,hoarseness  Pulmonary: Denies shortness of breath ,cough  ,dyspnea on exertion, orthopnea  ,PND   Cardiovascular:  Denies bradycardia , tachycardia  ,palpations, lower extremity edema leg, claudication  Breast:  Denies new or changing breast lumps ,nipple discharge ,nipple changes  Abdomen:  Denies abdominal pain , anorexia , indigestion, nausea, vomiting, constipation, diarrhea  Musculoskeletal: Denies myalgias, arthralgias, joint swelling, joint stiffness , limb pain, limb swelling  Gu: denies dysuria, polyuria  Skin: Denies skin rash, skin lesion, skin wound, itching, dry skin  Neuro: Denies headache, numbness, tingling, confusion, loss of consciousness, dizziness, vertigo  Psychiatric: Denies feelings of depression, suicidal ideation, anxiety, sleep disturbances    OBJECTIVE  /78   Pulse 72   Temp 98 1 °F (36 7 °C)   Ht 4' 11 75" (1 518 m)   Wt 80 8 kg (178 lb 1 oz)   BMI 35 07 kg/m²   Constitutional:   NAD, well appearing and well nourished      ENT:   Conjunctiva and lids: no injection, edema, or discharge     Pupils and iris: HENRY bilaterally    External inspection of ears and nose: normal without deformities or discharge  Otoscopic exam: Canals patent without erythema  Nasal mucosa, septum and turbinates: Normal or edema or discharge         Oropharynx:  Moist mucosa, normal tongue and tonsils without lesions  No erythema        Pulmonary:Respiratory effort normal rate and rhythm, no increased work of breathing  Auscultation of lungs:  Clear bilaterally with no adventitious breath sounds     Cardiovascular: regular rate and rhythm, S1 and S2, no murmur, no edema and/or varicosities of LE      Abdomen: Soft and non-distended     Positive bowel sounds      No heptomegaly or splenomegaly      Gu: no suprapubic tenderness or CVA tenderness, no urethral discharge  Lymphatic:  No anterior or posterior cervical lymphadenopathy         Musculoskeletal:  Gait and station: Normal gait      Digits and nails normal without clubbing or cyanosis       Inspection/palpation of joints, bones, and muscles:  No joint tenderness, swelling, full active and passive range of motion       Skin: Normal skin turgor and no rashes      Neuro:    Normal reflexes      Psych:   alert and oriented to person, place and time     normal mood and affect   Patient's shoes and socks removed  Right Foot/Ankle   Right Foot Inspection  Skin Exam: skin normal and skin intact no dry skin, no warmth, no callus, no erythema, no maceration, no abnormal color, no pre-ulcer, no ulcer and no callus                          Toe Exam: ROM and strength within normal limitsno swelling, no tenderness, erythema and  no right toe deformity  Sensory   Vibration: intact  Proprioception: intact   Monofilament testing: intact  Vascular  Capillary refills: < 3 seconds  The right DP pulse is 2+   The right PT pulse is 2+  Left Foot/Ankle  Left Foot Inspection  Skin Exam: skin normal and skin intactno dry skin, no warmth, no erythema, no maceration, normal color, no pre-ulcer, no ulcer and no callus                         Toe Exam: no swelling, no tenderness, no erythema and no left toe deformity                   Sensory   Vibration: intact  Proprioception: intact  Monofilament: intact  Vascular  Capillary refills: < 3 seconds  The left DP pulse is 2+  The left PT pulse is 2+  Assign Risk Category:  No deformity present; No loss of protective sensation; No weak pulses       Risk: 0      Assessment/Plan:Diagnoses and all orders for this visit:    Medicare annual wellness visit, subsequent    Diabetes 1 5, managed as type 2 ([facility] [address] )  -     Microalbumin / creatinine urine ratio  -     HEMOGLOBIN A1C W/ EAG ESTIMATION; Future    Hypercholesterolemia  -     Krill Oil 1000 MG CAPS; Take 2 capsules (2,000 mg total) by mouth daily  -     Lipid Panel with Direct LDL reflex; Future    Osteoporosis screening  -     DXA bone density spine hip and pelvis; Future    Encounter for hepatitis C screening test for low risk patient  -     Hepatitis C antibody; Future    Colon cancer screening  -     Cologuard; Future    Hypothyroidism, unspecified type  -     TSH, 3rd generation; Future    Essential hypertension  -     Comprehensive metabolic panel; Future    Vertigo  -     meclizine (ANTIVERT) 12 5 MG tablet; Take 1 tablet (12 5 mg total) by mouth 3 (three) times a day as needed for dizziness    Other orders  -     Cancel: Mammo screening bilateral w cad; Future        I will see patient back in 4 mos or sooner prn

## 2019-08-26 NOTE — PROGRESS NOTES
BMI Counseling: Body mass index is 35 07 kg/m²  Discussed the patient's BMI with her  The BMI is above average  BMI counseling and education was provided to the patient  Nutrition recommendations include reducing portion sizes  Assessment and Plan:     Problem List Items Addressed This Visit     None         History of Present Illness:     Patient presents for Medicare Annual Wellness visit  She refuses a mammogram, but is requesting a manual breast exam today, she is willing to do a home cologuard, a dexa scan and is up to date with immunizations and labs        Patient Care Team:  Bartolome Carter DO as PCP - General  Parth Coronel MD     Problem List:     Patient Active Problem List   Diagnosis    Type 2 diabetes mellitus without complication, without long-term current use of insulin ([facility] [address] )      Past Medical and Surgical History:     Past Medical History:   Diagnosis Date    Anxiety     Depression     Hyperlipidemia     Stroke [facility])      Past Surgical History:   Procedure Laterality Date    EYE SURGERY      cosmetic      Family History:     Family History   Problem Relation Age of Onset    Depression Maternal Uncle     Diabetes Maternal Uncle       Social History:     Social History     Tobacco Use   Smoking Status Never Smoker   Smokeless Tobacco Never Used     Social History     Substance and Sexual Activity   Alcohol Use Yes    Comment: social     Social History     Substance and Sexual Activity   Drug Use Never      Medications and Allergies:     Current Outpatient Medications   Medication Sig Dispense Refill    ALPRAZolam (XANAX) 0 25 mg tablet Take 1 tablet (0 25 mg total) by mouth 3 (three) times a day as needed for anxiety 90 tablet 3    citalopram (CeleXA) 20 mg tablet Take one and one half of a tablet daily (30 mg total ) 45 tablet 3    levothyroxine 25 mcg tablet Take 1 tablet (25 mcg total) by mouth daily 90 tablet 3    metoprolol succinate (TOPROL-XL) 25 mg 24 hr tablet Take 1 tablet (25 mg total) by mouth daily 90 tablet 3     No current facility-administered medications for this visit  No Known Allergies   Immunizations:     Immunization History   Administered Date(s) Administered    H1N1, All Formulations 02/05/2010    INFLUENZA 10/27/2015, 09/21/2016, 10/23/2017, 11/10/2017    Influenza Split High Dose Preservative Free IM 10/27/2015, 09/21/2016, 11/10/2017    Influenza TIV (IM) 01/24/2013, 10/22/2013    Influenza, high dose seasonal 0 5 mL 11/19/2018    Pneumococcal Conjugate 13-Valent 09/21/2016    Pneumococcal Polysaccharide PPV23 05/12/2016      Medicare Screening Tests and Risk Assessments:     Taylor Kebede is here for her Subsequent Wellness visit  Last Medicare Wellness visit information reviewed, patient interviewed, no change since last AWV  Health Risk Assessment:  Patient rates overall health as fair  Patient feels that their physical health rating is Same  Eyesight was rated as Same  Hearing was rated as Same  Patient feels that their emotional and mental health rating is Slightly better  Pain experienced by patient in the last 7 days has been None  Patient states that she has experienced no weight loss or gain in last 6 months  Emotional/Mental Health:  Patient has been feeling nervous/anxious  PHQ-9 Depression Screening:    Frequency of the following problems over the past two weeks:      1  Little interest or pleasure in doing things: 0 - not at all      2  Feeling down, depressed, or hopeless: 0 - not at all  PHQ-2 Score: 0          Broken Bones/Falls: Fall Risk Assessment:    In the past year, patient has experienced: No history of falling in past year          Bladder/Bowel:  Patient has leaked urine accidently in the last six months  Patient reports no loss of bowel control  Immunizations:  Patient has had a flu vaccination within the last year  Patient has received a pneumonia shot  Patient has not received a shingles shot    Patient has not received tetanus/diphtheria shot  Home Safety:  Patient has trouble with stairs inside or outside of their home  Patient currently reports that there are no safety hazards present in home, working smoke alarms, working carbon monoxide detectors  Preventative Screenings:   Breast cancer screening performed, colon cancer screen completed, cholesterol screen completed, 8/20/2019  glaucoma eye exam completed,     Nutrition:  Current diet: Regular with servings of the following:    Medications:  Patient is currently taking over-the-counter supplements  List of OTC medications includes: vitamin b12, krill oil, vitamin D   Patient is able to manage medications  Lifestyle Choices:  Patient reports no tobacco use  Patient has not smoked or used tobacco in the past   Patient reports alcohol use  Alcohol use per week: rare use   Patient drives a vehicle  Patient wears seat belt  Current level of exercise of physical activity described by patient as: moderate   Activities of Daily Living:  Can get out of bed by his or her self, able to dress self, able to make own meals, able to do own shopping, able to bathe self, can do own laundry/housekeeping, can manage own money, pay bills and track expenses    Previous Hospitalizations:  Hospitalization or ED visit in past 12 months        Advanced Directives:  Patient has decided on a power of   Patient has spoken to designated power of   Patient has completed advanced directive  Preventative Screening/Counseling:      Cardiovascular:      General: Screening Current          Diabetes:      General: Screening Current          Colorectal Cancer:      General: Risks and Benefits Discussed      Due for studies: Fecal Occult Blood          Breast Cancer:      General: Patient Declines      Comments: Manual breast exam done today          Cervical Cancer:      General: Screening Not Indicated          Osteoporosis:      General: Screening Current          AAA:      General: Screening Not Indicated          Glaucoma:      General: Screening Current          HIV:      General: Screening Not Indicated          Hepatitis C:      General: Screening Not Indicated        Advanced Directives:   Patient has living will for healthcare, has durable POA for healthcare, patient has an advanced directive  Information on ACP and/or AD provided  5 wishes given  End of life assessment reviewed with patient  Provider agrees with end of life decisions   Immunizations:      Influenza: Influenza UTD This Year      Pneumococcal: Lifetime Vaccine Completed      Hepatitis B (Low risk patients): Series Not Indicated      Zostavax: Zostavax Vaccine UTD      TD: Td Vaccine UTD          BMI Counseling: Body mass index is 35 07 kg/m²  Discussed the patient's BMI with her  The BMI is above average  BMI counseling and education was provided to the patient  Nutrition recommendations include reducing portion sizes and decreasing overall calorie intake

## 2019-10-08 ENCOUNTER — TELEPHONE (OUTPATIENT)
Dept: FAMILY MEDICINE CLINIC | Facility: CLINIC | Age: 69
End: 2019-10-08

## 2019-10-08 DIAGNOSIS — Z11.59 ENCOUNTER FOR HEPATITIS C SCREENING TEST FOR LOW RISK PATIENT: ICD-10-CM

## 2019-10-08 DIAGNOSIS — E03.9 HYPOTHYROIDISM, UNSPECIFIED TYPE: ICD-10-CM

## 2019-10-08 DIAGNOSIS — Z12.39 SCREENING FOR BREAST CANCER: Primary | ICD-10-CM

## 2019-10-08 DIAGNOSIS — Z12.39 SCREENING FOR MALIGNANT NEOPLASM OF BREAST: ICD-10-CM

## 2019-10-08 DIAGNOSIS — Z13.820 OSTEOPOROSIS SCREENING: ICD-10-CM

## 2019-10-08 DIAGNOSIS — E78.00 HYPERCHOLESTEROLEMIA: ICD-10-CM

## 2019-10-08 DIAGNOSIS — E13.9 DIABETES 1.5, MANAGED AS TYPE 2 (HCC): ICD-10-CM

## 2019-10-08 DIAGNOSIS — I10 ESSENTIAL HYPERTENSION: ICD-10-CM

## 2019-10-16 ENCOUNTER — IMMUNIZATIONS (OUTPATIENT)
Dept: FAMILY MEDICINE CLINIC | Facility: CLINIC | Age: 69
End: 2019-10-16
Payer: MEDICARE

## 2019-10-16 DIAGNOSIS — Z23 ENCOUNTER FOR IMMUNIZATION: ICD-10-CM

## 2019-10-16 PROCEDURE — G0008 ADMIN INFLUENZA VIRUS VAC: HCPCS | Performed by: FAMILY MEDICINE

## 2019-10-16 PROCEDURE — 90662 IIV NO PRSV INCREASED AG IM: CPT | Performed by: FAMILY MEDICINE

## 2019-10-23 DIAGNOSIS — F32.A DEPRESSION, UNSPECIFIED DEPRESSION TYPE: ICD-10-CM

## 2019-10-23 RX ORDER — CITALOPRAM 20 MG/1
TABLET ORAL
Qty: 45 TABLET | Refills: 3 | Status: SHIPPED | OUTPATIENT
Start: 2019-10-23 | End: 2020-02-24 | Stop reason: SDUPTHER

## 2019-11-05 LAB
LEFT EYE DIABETIC RETINOPATHY: NORMAL
RIGHT EYE DIABETIC RETINOPATHY: NORMAL
SEVERITY (EYE EXAM): NORMAL

## 2020-01-03 ENCOUNTER — APPOINTMENT (OUTPATIENT)
Dept: LAB | Facility: CLINIC | Age: 70
End: 2020-01-03
Payer: MEDICARE

## 2020-01-03 LAB
ALBUMIN SERPL BCP-MCNC: 3.8 G/DL (ref 3.5–5)
ALP SERPL-CCNC: 62 U/L (ref 46–116)
ALT SERPL W P-5'-P-CCNC: 80 U/L (ref 12–78)
ANION GAP SERPL CALCULATED.3IONS-SCNC: 4 MMOL/L (ref 4–13)
AST SERPL W P-5'-P-CCNC: 40 U/L (ref 5–45)
BILIRUB SERPL-MCNC: 0.6 MG/DL (ref 0.2–1)
BUN SERPL-MCNC: 14 MG/DL (ref 5–25)
CALCIUM SERPL-MCNC: 9 MG/DL (ref 8.3–10.1)
CHLORIDE SERPL-SCNC: 110 MMOL/L (ref 100–108)
CHOLEST SERPL-MCNC: 203 MG/DL (ref 50–200)
CO2 SERPL-SCNC: 26 MMOL/L (ref 21–32)
CREAT SERPL-MCNC: 0.96 MG/DL (ref 0.6–1.3)
EST. AVERAGE GLUCOSE BLD GHB EST-MCNC: 117 MG/DL
GFR SERPL CREATININE-BSD FRML MDRD: 60 ML/MIN/1.73SQ M
GLUCOSE P FAST SERPL-MCNC: 109 MG/DL (ref 65–99)
HBA1C MFR BLD: 5.7 % (ref 4.2–6.3)
HCV AB SER QL: NORMAL
HDLC SERPL-MCNC: 35 MG/DL
LDLC SERPL CALC-MCNC: 118 MG/DL (ref 0–100)
POTASSIUM SERPL-SCNC: 3.8 MMOL/L (ref 3.5–5.3)
PROT SERPL-MCNC: 7.4 G/DL (ref 6.4–8.2)
SODIUM SERPL-SCNC: 140 MMOL/L (ref 136–145)
TRIGL SERPL-MCNC: 251 MG/DL
TSH SERPL DL<=0.05 MIU/L-ACNC: 1.87 UIU/ML (ref 0.36–3.74)

## 2020-01-03 PROCEDURE — 83036 HEMOGLOBIN GLYCOSYLATED A1C: CPT

## 2020-01-03 PROCEDURE — 84443 ASSAY THYROID STIM HORMONE: CPT

## 2020-01-03 PROCEDURE — 80053 COMPREHEN METABOLIC PANEL: CPT

## 2020-01-03 PROCEDURE — 80061 LIPID PANEL: CPT

## 2020-01-03 PROCEDURE — 36415 COLL VENOUS BLD VENIPUNCTURE: CPT

## 2020-01-03 PROCEDURE — 86803 HEPATITIS C AB TEST: CPT

## 2020-01-06 ENCOUNTER — APPOINTMENT (OUTPATIENT)
Dept: LAB | Facility: CLINIC | Age: 70
End: 2020-01-06
Payer: MEDICARE

## 2020-01-06 LAB
CREAT UR-MCNC: 38.6 MG/DL
MICROALBUMIN UR-MCNC: <5 MG/L (ref 0–20)
MICROALBUMIN/CREAT 24H UR: <13 MG/G CREATININE (ref 0–30)

## 2020-01-06 PROCEDURE — 82570 ASSAY OF URINE CREATININE: CPT | Performed by: FAMILY MEDICINE

## 2020-01-06 PROCEDURE — 82043 UR ALBUMIN QUANTITATIVE: CPT | Performed by: FAMILY MEDICINE

## 2020-01-07 ENCOUNTER — OFFICE VISIT (OUTPATIENT)
Dept: FAMILY MEDICINE CLINIC | Facility: CLINIC | Age: 70
End: 2020-01-07
Payer: MEDICARE

## 2020-01-07 VITALS
HEART RATE: 72 BPM | BODY MASS INDEX: 35.5 KG/M2 | RESPIRATION RATE: 16 BRPM | SYSTOLIC BLOOD PRESSURE: 122 MMHG | TEMPERATURE: 98.8 F | DIASTOLIC BLOOD PRESSURE: 76 MMHG | HEIGHT: 59 IN | WEIGHT: 176.1 LBS

## 2020-01-07 DIAGNOSIS — F41.9 ANXIETY: ICD-10-CM

## 2020-01-07 DIAGNOSIS — I10 ESSENTIAL HYPERTENSION: ICD-10-CM

## 2020-01-07 DIAGNOSIS — E13.9 DIABETES 1.5, MANAGED AS TYPE 2 (HCC): Primary | ICD-10-CM

## 2020-01-07 DIAGNOSIS — E78.00 HYPERCHOLESTEROLEMIA: ICD-10-CM

## 2020-01-07 DIAGNOSIS — E03.9 HYPOTHYROIDISM, UNSPECIFIED TYPE: ICD-10-CM

## 2020-01-07 PROCEDURE — 99214 OFFICE O/P EST MOD 30 MIN: CPT | Performed by: FAMILY MEDICINE

## 2020-01-07 RX ORDER — ALPRAZOLAM 0.25 MG/1
0.25 TABLET ORAL 3 TIMES DAILY PRN
Qty: 90 TABLET | Refills: 0 | Status: SHIPPED | OUTPATIENT
Start: 2020-01-07 | End: 2020-08-24 | Stop reason: SDUPTHER

## 2020-01-08 NOTE — PROGRESS NOTES
Patient ID: Mary Allen is a 68 y o  female  HPI: 68 y  o female presents for follow up of niddm, hypertension, hypercholesterolemia and hypothyroidism,  All are well controlled (hgba1c is good at 5 7 with just diet control )  She is statin intolerant, but doing well with just fish oil        SUBJECTIVE    Family History   Problem Relation Age of Onset    Depression Maternal Uncle     Diabetes Maternal Uncle      Social History     Socioeconomic History    Marital status: Single     Spouse name: Not on file    Number of children: Not on file    Years of education: completed 12th grade    Highest education level: Not on file   Occupational History    Not on file   Social Needs    Financial resource strain: Not on file    Food insecurity:     Worry: Not on file     Inability: Not on file    Transportation needs:     Medical: Not on file     Non-medical: Not on file   Tobacco Use    Smoking status: Never Smoker    Smokeless tobacco: Never Used   Substance and Sexual Activity    Alcohol use: Yes     Comment: social    Drug use: Never    Sexual activity: Not Currently   Lifestyle    Physical activity:     Days per week: Not on file     Minutes per session: Not on file    Stress: Not on file   Relationships    Social connections:     Talks on phone: Not on file     Gets together: Not on file     Attends Pentecostalism service: Not on file     Active member of club or organization: Not on file     Attends meetings of clubs or organizations: Not on file     Relationship status: Not on file    Intimate partner violence:     Fear of current or ex partner: Not on file     Emotionally abused: Not on file     Physically abused: Not on file     Forced sexual activity: Not on file   Other Topics Concern    Not on file   Social History Narrative    Daily coffee consumption: ____cp/day    Daily cola consumption: ___ can/day    daily tea consumption: _____cp/day     Past Medical History:   Diagnosis Date    Anxiety     Depression     Hyperlipidemia     Stroke [facility])      Past Surgical History:   Procedure Laterality Date    EYE SURGERY      cosmetic     No Known Allergies    Current Outpatient Medications:     ALPRAZolam (XANAX) 0 25 mg tablet, Take 1 tablet (0 25 mg total) by mouth 3 (three) times a day as needed for anxiety, Disp: 90 tablet, Rfl: 0    citalopram (CeleXA) 20 mg tablet, Take one and one half of a tablet daily (30 mg total ), Disp: 45 tablet, Rfl: 3    Krill Oil 1000 MG CAPS, Take 2 capsules (2,000 mg total) by mouth daily, Disp: 60 capsule, Rfl: 3    levothyroxine 25 mcg tablet, Take 1 tablet (25 mcg total) by mouth daily, Disp: 90 tablet, Rfl: 3    metoprolol succinate (TOPROL-XL) 25 mg 24 hr tablet, Take 1 tablet (25 mg total) by mouth daily, Disp: 90 tablet, Rfl: 3    Review of Systems  Constitutional:     Denies fever, chills ,fatigue ,weakness ,weight loss, weight gain     ENT: Denies earache ,loss of hearing ,nosebleed, nasal discharge,nasal congestion ,sore throat ,hoarseness  Pulmonary: Denies shortness of breath ,cough  ,dyspnea on exertion, orthopnea  ,PND   Cardiovascular:  Denies bradycardia , tachycardia  ,palpations, lower extremity edema leg, claudication  Breast:  Denies new or changing breast lumps ,nipple discharge ,nipple changes  Abdomen:  Denies abdominal pain , anorexia , indigestion, nausea, vomiting, constipation, diarrhea  Musculoskeletal: Denies myalgias, arthralgias, joint swelling, joint stiffness , limb pain, limb swelling  Gu: denies dysuria, polyuria  Skin: Denies skin rash, skin lesion, skin wound, itching, dry skin  Neuro: Denies headache, numbness, tingling, confusion, loss of consciousness, dizziness, vertigo  Psychiatric: Denies feelings of depression, suicidal ideation, anxiety, sleep disturbances    OBJECTIVE  /76   Pulse 72   Temp 98 8 °F (37 1 °C)   Resp 16   Ht 4' 11" (1 499 m)   Wt 79 9 kg (176 lb 1 6 oz)   BMI 35 57 kg/m²   Constitutional: NAD, well appearing and well nourished      ENT:   Conjunctiva and lids: no injection, edema, or discharge     Pupils and iris: HENRY bilaterally    External inspection of ears and nose: normal without deformities or discharge  Otoscopic exam: Canals patent without erythema  Nasal mucosa, septum and turbinates: Normal or edema or discharge         Oropharynx:  Moist mucosa, normal tongue and tonsils without lesions  No erythema        Pulmonary:Respiratory effort normal rate and rhythm, no increased work of breathing  Auscultation of lungs:  Clear bilaterally with no adventitious breath sounds       Cardiovascular: regular rate and rhythm, S1 and S2, no murmur, no edema and/or varicosities of LE      Abdomen: Soft and non-distended     Positive bowel sounds      No heptomegaly or splenomegaly      Gu: no suprapubic tenderness or CVA tenderness, no urethral discharge  Lymphatic:  No anterior or posterior cervical lymphadenopathy         Musculoskeletal:  Gait and station: Normal gait      Digits and nails normal without clubbing or cyanosis       Inspection/palpation of joints, bones, and muscles:  No joint tenderness, swelling, full active and passive range of motion       Skin: Normal skin turgor and no rashes    Neuro:      Normal reflexes       Psych:   alert and oriented to person, place and time     normal mood and affect       Assessment/Plan:Diagnoses and all orders for this visit:    Diabetes 1 5, managed as type 2 ([address] )  -     HEMOGLOBIN A1C W/ EAG ESTIMATION; Future    Essential hypertension  -     Comprehensive metabolic panel; Future    Hypercholesterolemia  -     Lipid Panel with Direct LDL reflex; Future    Hypothyroidism, unspecified type  -     TSH, 3rd generation; Future      I will see patient back in 4 mos or sooner prn

## 2020-02-05 ENCOUNTER — PREPPED CHART (OUTPATIENT)
Dept: URBAN - METROPOLITAN AREA CLINIC 6 | Facility: CLINIC | Age: 70
End: 2020-02-05

## 2020-02-24 DIAGNOSIS — F32.A DEPRESSION, UNSPECIFIED DEPRESSION TYPE: ICD-10-CM

## 2020-02-24 RX ORDER — CITALOPRAM 20 MG/1
TABLET ORAL
Qty: 45 TABLET | Refills: 3 | Status: SHIPPED | OUTPATIENT
Start: 2020-02-24 | End: 2020-03-25 | Stop reason: SDUPTHER

## 2020-03-19 ENCOUNTER — TELEPHONE (OUTPATIENT)
Dept: FAMILY MEDICINE CLINIC | Facility: CLINIC | Age: 70
End: 2020-03-19

## 2020-03-19 NOTE — TELEPHONE ENCOUNTER
As per Dr Liv Cantu patient doesn't need to come in for a NV  She is to take 1/2 pill for a week then drop to 1/2 pill every other day and then can stop   Any problems she is to call the office

## 2020-03-19 NOTE — TELEPHONE ENCOUNTER
Patient said she is stopping Metoprolol ,   The night before last she got dizzy and passed out for seconds then vomited,  google says those are symptoms of low blood pressure,  She has no readings but has history and family history of low blood pressure  If you want to talk to her call her

## 2020-03-19 NOTE — TELEPHONE ENCOUNTER
She needs to come in first thing in am for a nurse bp check ; you cannot suddenly stop a betablocker or you can get rebound tachycardia (rapid heart rate) She should just take 1/2 dose today

## 2020-03-25 DIAGNOSIS — F32.A DEPRESSION, UNSPECIFIED DEPRESSION TYPE: ICD-10-CM

## 2020-03-26 RX ORDER — CITALOPRAM 20 MG/1
TABLET ORAL
Qty: 135 TABLET | Refills: 1 | Status: SHIPPED | OUTPATIENT
Start: 2020-03-26 | End: 2020-10-09 | Stop reason: SDUPTHER

## 2020-04-10 DIAGNOSIS — E03.9 HYPOTHYROIDISM, UNSPECIFIED TYPE: ICD-10-CM

## 2020-04-10 RX ORDER — LEVOTHYROXINE SODIUM 0.03 MG/1
TABLET ORAL
Qty: 90 TABLET | Refills: 0 | Status: SHIPPED | OUTPATIENT
Start: 2020-04-10 | End: 2020-07-27 | Stop reason: SDUPTHER

## 2020-05-07 ENCOUNTER — OFFICE VISIT (OUTPATIENT)
Dept: FAMILY MEDICINE CLINIC | Facility: CLINIC | Age: 70
End: 2020-05-07
Payer: MEDICARE

## 2020-05-07 VITALS
HEIGHT: 59 IN | HEART RATE: 74 BPM | BODY MASS INDEX: 35.68 KG/M2 | DIASTOLIC BLOOD PRESSURE: 82 MMHG | SYSTOLIC BLOOD PRESSURE: 124 MMHG | TEMPERATURE: 99 F | WEIGHT: 177 LBS

## 2020-05-07 DIAGNOSIS — F41.8 DEPRESSION WITH ANXIETY: ICD-10-CM

## 2020-05-07 DIAGNOSIS — E11.9 TYPE 2 DIABETES MELLITUS WITHOUT COMPLICATION, WITHOUT LONG-TERM CURRENT USE OF INSULIN (HCC): Primary | ICD-10-CM

## 2020-05-07 DIAGNOSIS — E78.2 MIXED HYPERLIPIDEMIA: ICD-10-CM

## 2020-05-07 PROBLEM — E78.5 HYPERLIPIDEMIA: Status: ACTIVE | Noted: 2020-05-07

## 2020-05-07 PROCEDURE — 2022F DILAT RTA XM EVC RTNOPTHY: CPT | Performed by: FAMILY MEDICINE

## 2020-05-07 PROCEDURE — 4040F PNEUMOC VAC/ADMIN/RCVD: CPT | Performed by: FAMILY MEDICINE

## 2020-05-07 PROCEDURE — 3008F BODY MASS INDEX DOCD: CPT | Performed by: FAMILY MEDICINE

## 2020-05-07 PROCEDURE — 3074F SYST BP LT 130 MM HG: CPT | Performed by: FAMILY MEDICINE

## 2020-05-07 PROCEDURE — 99213 OFFICE O/P EST LOW 20 MIN: CPT | Performed by: FAMILY MEDICINE

## 2020-05-07 PROCEDURE — 1036F TOBACCO NON-USER: CPT | Performed by: FAMILY MEDICINE

## 2020-05-07 PROCEDURE — 3079F DIAST BP 80-89 MM HG: CPT | Performed by: FAMILY MEDICINE

## 2020-05-07 PROCEDURE — 1160F RVW MEDS BY RX/DR IN RCRD: CPT | Performed by: FAMILY MEDICINE

## 2020-05-07 PROCEDURE — 3044F HG A1C LEVEL LT 7.0%: CPT | Performed by: FAMILY MEDICINE

## 2020-07-27 DIAGNOSIS — E03.9 HYPOTHYROIDISM, UNSPECIFIED TYPE: ICD-10-CM

## 2020-07-27 RX ORDER — LEVOTHYROXINE SODIUM 0.03 MG/1
25 TABLET ORAL DAILY
Qty: 90 TABLET | Refills: 0 | Status: SHIPPED | OUTPATIENT
Start: 2020-07-27 | End: 2020-10-16 | Stop reason: SDUPTHER

## 2020-08-24 DIAGNOSIS — F41.9 ANXIETY: ICD-10-CM

## 2020-08-24 NOTE — TELEPHONE ENCOUNTER
Per PDMP last fill 01/07/20     Last OV 05/07/20, Next OV 09/08/20 01/07/2020 1 01/07/2020 ALPRAZOLAM 0 25 MG TABLET 90 0 30 CA BRO [id number] JUAQUIN ([id number]) 0 Medicare PA

## 2020-08-25 RX ORDER — ALPRAZOLAM 0.25 MG/1
0.25 TABLET ORAL 3 TIMES DAILY PRN
Qty: 90 TABLET | Refills: 0 | Status: SHIPPED | OUTPATIENT
Start: 2020-08-25 | End: 2021-04-13 | Stop reason: SDUPTHER

## 2020-09-08 ENCOUNTER — OFFICE VISIT (OUTPATIENT)
Dept: FAMILY MEDICINE CLINIC | Facility: CLINIC | Age: 70
End: 2020-09-08
Payer: MEDICARE

## 2020-09-08 VITALS
DIASTOLIC BLOOD PRESSURE: 78 MMHG | HEART RATE: 64 BPM | OXYGEN SATURATION: 93 % | TEMPERATURE: 99.7 F | BODY MASS INDEX: 36.08 KG/M2 | WEIGHT: 179 LBS | SYSTOLIC BLOOD PRESSURE: 118 MMHG | HEIGHT: 59 IN

## 2020-09-08 DIAGNOSIS — E03.9 HYPOTHYROIDISM, UNSPECIFIED TYPE: ICD-10-CM

## 2020-09-08 DIAGNOSIS — E78.00 HYPERCHOLESTEROLEMIA: ICD-10-CM

## 2020-09-08 DIAGNOSIS — Z00.00 MEDICARE ANNUAL WELLNESS VISIT, SUBSEQUENT: Primary | ICD-10-CM

## 2020-09-08 DIAGNOSIS — I10 ESSENTIAL HYPERTENSION: ICD-10-CM

## 2020-09-08 DIAGNOSIS — E11.9 TYPE 2 DIABETES MELLITUS WITHOUT COMPLICATION, WITHOUT LONG-TERM CURRENT USE OF INSULIN (HCC): ICD-10-CM

## 2020-09-08 LAB — SL AMB POCT HEMOGLOBIN AIC: 5.8 (ref ?–6.5)

## 2020-09-08 PROCEDURE — 99214 OFFICE O/P EST MOD 30 MIN: CPT | Performed by: FAMILY MEDICINE

## 2020-09-08 PROCEDURE — 83036 HEMOGLOBIN GLYCOSYLATED A1C: CPT | Performed by: FAMILY MEDICINE

## 2020-09-08 PROCEDURE — G0439 PPPS, SUBSEQ VISIT: HCPCS | Performed by: FAMILY MEDICINE

## 2020-09-08 NOTE — PATIENT INSTRUCTIONS
Medicare Preventive Visit Patient Instructions  Thank you for completing your Welcome to Medicare Visit or Medicare Annual Wellness Visit today  Your next wellness visit will be due in one year (9/8/2021)  The screening/preventive services that you may require over the next 5-10 years are detailed below  Some tests may not apply to you based off risk factors and/or age  Screening tests ordered at today's visit but not completed yet may show as past due  Also, please note that scanned in results may not display below  Preventive Screenings:  Service Recommendations Previous Testing/Comments   Colorectal Cancer Screening  * Colonoscopy    * Fecal Occult Blood Test (FOBT)/Fecal Immunochemical Test (FIT)  * Fecal DNA/Cologuard Test  * Flexible Sigmoidoscopy Age: 54-65 years old   Colonoscopy: every 10 years (may be performed more frequently if at higher risk)  OR  FOBT/FIT: every 1 year  OR  Cologuard: every 3 years  OR  Sigmoidoscopy: every 5 years  Screening may be recommended earlier than age 48 if at higher risk for colorectal cancer  Also, an individualized decision between you and your healthcare provider will decide whether screening between the ages of 74-80 would be appropriate  Colonoscopy: 03/06/2008  FOBT/FIT: Not on file  Cologuard: 09/16/2019  Sigmoidoscopy: Not on file    Screening Current     Breast Cancer Screening Age: 36 years old  Frequency: every 1-2 years  Not required if history of left and right mastectomy Mammogram: 06/02/2011       Cervical Cancer Screening Between the ages of 21-29, pap smear recommended once every 3 years  Between the ages of 33-67, can perform pap smear with HPV co-testing every 5 years     Recommendations may differ for women with a history of total hysterectomy, cervical cancer, or abnormal pap smears in past  Pap Smear: Not on file    Screening Not Indicated   Hepatitis C Screening Once for adults born between [facility]  More frequently in patients at high risk for Hepatitis C Hep C Antibody: 01/03/2020    Screening Current   Diabetes Screening 1-2 times per year if you're at risk for diabetes or have pre-diabetes Fasting glucose: 109 mg/dL   A1C: 5 7 %    Screening Not Indicated  History Diabetes   Cholesterol Screening Once every 5 years if you don't have a lipid disorder  May order more often based on risk factors  Lipid panel: 01/03/2020    Screening Not Indicated  History Lipid Disorder     Other Preventive Screenings Covered by Medicare:  1  Abdominal Aortic Aneurysm (AAA) Screening: covered once if your at risk  You're considered to be at risk if you have a family history of AAA  2  Lung Cancer Screening: covers low dose CT scan once per year if you meet all of the following conditions: (1) Age 50-69; (2) No signs or symptoms of lung cancer; (3) Current smoker or have quit smoking within the last 15 years; (4) You have a tobacco smoking history of at least 30 pack years (packs per day multiplied by number of years you smoked); (5) You get a written order from a healthcare provider  3  Glaucoma Screening: covered annually if you're considered high risk: (1) You have diabetes OR (2) Family history of glaucoma OR (3)  aged 48 and older OR (3)  American aged 72 and older  3  Osteoporosis Screening: covered every 2 years if you meet one of the following conditions: (1) You're estrogen deficient and at risk for osteoporosis based off medical history and other findings; (2) Have a vertebral abnormality; (3) On glucocorticoid therapy for more than 3 months; (4) Have primary hyperparathyroidism; (5) On osteoporosis medications and need to assess response to drug therapy  · Last bone density test (DXA Scan): 06/06/2002  5  HIV Screening: covered annually if you're between the age of 12-76  Also covered annually if you are younger than 13 and older than 72 with risk factors for HIV infection   For pregnant patients, it is covered up to 3 times per pregnancy  Immunizations:  Immunization Recommendations   Influenza Vaccine Annual influenza vaccination during flu season is recommended for all persons aged >= 6 months who do not have contraindications   Pneumococcal Vaccine (Prevnar and Pneumovax)  * Prevnar = PCV13  * Pneumovax = PPSV23   Adults 25-60 years old: 1-3 doses may be recommended based on certain risk factors  Adults 72 years old: Prevnar (PCV13) vaccine recommended followed by Pneumovax (PPSV23) vaccine  If already received PPSV23 since turning 65, then PCV13 recommended at least one year after PPSV23 dose  Hepatitis B Vaccine 3 dose series if at intermediate or high risk (ex: diabetes, end stage renal disease, liver disease)   Tetanus (Td) Vaccine - COST NOT COVERED BY MEDICARE PART B Following completion of primary series, a booster dose should be given every 10 years to maintain immunity against tetanus  Td may also be given as tetanus wound prophylaxis  Tdap Vaccine - COST NOT COVERED BY MEDICARE PART B Recommended at least once for all adults  For pregnant patients, recommended with each pregnancy  Shingles Vaccine (Shingrix) - COST NOT COVERED BY MEDICARE PART B  2 shot series recommended in those aged 48 and above     Health Maintenance Due:      Topic Date Due    DXA SCAN  06/06/2007    MAMMOGRAM  06/02/2012    Hepatitis C Screening  Completed     Immunizations Due:      Topic Date Due    Influenza Vaccine  07/01/2020     Advance Directives   What are advance directives? Advance directives are legal documents that state your wishes and plans for medical care  These plans are made ahead of time in case you lose your ability to make decisions for yourself  Advance directives can apply to any medical decision, such as the treatments you want, and if you want to donate organs  What are the types of advance directives? There are many types of advance directives, and each state has rules about how to use them   You may choose a combination of any of the following:  · Living will: This is a written record of the treatment you want  You can also choose which treatments you do not want, which to limit, and which to stop at a certain time  This includes surgery, medicine, IV fluid, and tube feedings  · Durable power of  for healthcare [facility]): This is a written record that states who you want to make healthcare choices for you when you are unable to make them for yourself  This person, called a proxy, is usually a family member or a friend  You may choose more than 1 proxy  · Do not resuscitate (DNR) order:  A DNR order is used in case your heart stops beating or you stop breathing  It is a request not to have certain forms of treatment, such as CPR  A DNR order may be included in other types of advance directives  · Medical directive: This covers the care that you want if you are in a coma, near death, or unable to make decisions for yourself  You can list the treatments you want for each condition  Treatment may include pain medicine, surgery, blood transfusions, dialysis, IV or tube feedings, and a ventilator (breathing machine)  · Values history: This document has questions about your views, beliefs, and how you feel and think about life  This information can help others choose the care that you would choose  Why are advance directives important? An advance directive helps you control your care  Although spoken wishes may be used, it is better to have your wishes written down  Spoken wishes can be misunderstood, or not followed  Treatments may be given even if you do not want them  An advance directive may make it easier for your family to make difficult choices about your care  Weight Management   Why it is important to manage your weight:  Being overweight increases your risk of health conditions such as heart disease, high blood pressure, type 2 diabetes, and certain types of cancer   It can also increase your risk for osteoarthritis, sleep apnea, and other respiratory problems  Aim for a slow, steady weight loss  Even a small amount of weight loss can lower your risk of health problems  How to lose weight safely:  A safe and healthy way to lose weight is to eat fewer calories and get regular exercise  You can lose up about 1 pound a week by decreasing the number of calories you eat by 500 calories each day  Healthy meal plan for weight management:  A healthy meal plan includes a variety of foods, contains fewer calories, and helps you stay healthy  A healthy meal plan includes the following:  · Eat whole-grain foods more often  A healthy meal plan should contain fiber  Fiber is the part of grains, fruits, and vegetables that is not broken down by your body  Whole-grain foods are healthy and provide extra fiber in your diet  Some examples of whole-grain foods are whole-wheat breads and pastas, oatmeal, brown rice, and bulgur  · Eat a variety of vegetables every day  Include dark, leafy greens such as spinach, kale, nikole greens, and mustard greens  Eat yellow and orange vegetables such as carrots, sweet potatoes, and winter squash  · Eat a variety of fruits every day  Choose fresh or canned fruit (canned in its own juice or light syrup) instead of juice  Fruit juice has very little or no fiber  · Eat low-fat dairy foods  Drink fat-free (skim) milk or 1% milk  Eat fat-free yogurt and low-fat cottage cheese  Try low-fat cheeses such as mozzarella and other reduced-fat cheeses  · Choose meat and other protein foods that are low in fat  Choose beans or other legumes such as split peas or lentils  Choose fish, skinless poultry (chicken or turkey), or lean cuts of red meat (beef or pork)  Before you cook meat or poultry, cut off any visible fat  · Use less fat and oil  Try baking foods instead of frying them  Add less fat, such as margarine, sour cream, regular salad dressing and mayonnaise to foods   Eat fewer high-fat foods  Some examples of high-fat foods include french fries, doughnuts, ice cream, and cakes  · Eat fewer sweets  Limit foods and drinks that are high in sugar  This includes candy, cookies, regular soda, and sweetened drinks  Exercise:  Exercise at least 30 minutes per day on most days of the week  Some examples of exercise include walking, biking, dancing, and swimming  You can also fit in more physical activity by taking the stairs instead of the elevator or parking farther away from stores  Ask your healthcare provider about the best exercise plan for you  © Copyright [employer] 2018 Information is for End User's use only and may not be sold, redistributed or otherwise used for commercial purposes   All illustrations and images included in CareNotes® are the copyrighted property of A D A M , Inc  or [address] [place] [address]

## 2020-09-08 NOTE — PROGRESS NOTES
Assessment and Plan:     Problem List Items Addressed This Visit     None           Preventive health issues were discussed with patient, and age appropriate screening tests were ordered as noted in patient's After Visit Summary  Personalized health advice and appropriate referrals for health education or preventive services given if needed, as noted in patient's After Visit Summary  History of Present Illness:     Patient presents for Medicare Annual Wellness visit  She is up to date with all preventative studies        Patient Care Team:  Dipika Dodson DO as PCP - General  Nuzhat Chery MD     Problem List:     Patient Active Problem List   Diagnosis    Type 2 diabetes mellitus without complication, without long-term current use of insulin ([facility] [address] )    Hyperlipidemia    Depression with anxiety      Past Medical and Surgical History:     Past Medical History:   Diagnosis Date    Anxiety     Depression     Hyperlipidemia     Stroke [facility])      Past Surgical History:   Procedure Laterality Date    EYE SURGERY      cosmetic      Family History:     Family History   Problem Relation Age of Onset    Depression Maternal Uncle     Diabetes Maternal Uncle       Social History:        Social History     Socioeconomic History    Marital status: Single     Spouse name: None    Number of children: None    Years of education: completed 12th grade    Highest education level: None   Occupational History    None   Social Needs    Financial resource strain: None    Food insecurity     Worry: None     Inability: None    Transportation needs     Medical: None     Non-medical: None   Tobacco Use    Smoking status: Never Smoker    Smokeless tobacco: Never Used   Substance and Sexual Activity    Alcohol use: Yes     Comment: social    Drug use: Never    Sexual activity: Not Currently   Lifestyle    Physical activity     Days per week: None     Minutes per session: None    Stress: None Relationships    Social connections     Talks on phone: None     Gets together: None     Attends [employer] service: None     Active member of club or organization: None     Attends meetings of clubs or organizations: None     Relationship status: None    Intimate partner violence     Fear of current or ex partner: None     Emotionally abused: None     Physically abused: None     Forced sexual activity: None   Other Topics Concern    None   Social History Narrative    Daily coffee consumption: ____cp/day    Daily cola consumption: ___ can/day    daily tea consumption: _____cp/day      Medications and Allergies:     Current Outpatient Medications   Medication Sig Dispense Refill    ALPRAZolam (XANAX) 0 25 mg tablet Take 1 tablet (0 25 mg total) by mouth 3 (three) times a day as needed for anxiety 90 tablet 0    citalopram (CeleXA) 20 mg tablet Take one and one half of a tablet daily (30 mg total ) 135 tablet 1    Krill Oil 1000 MG CAPS Take 2 capsules (2,000 mg total) by mouth daily 60 capsule 3    levothyroxine 25 mcg tablet Take 1 tablet (25 mcg total) by mouth daily 90 tablet 0     No current facility-administered medications for this visit        Allergies   Allergen Reactions    Statins      myalgia      Immunizations:     Immunization History   Administered Date(s) Administered    H1N1, All Formulations 02/05/2010    INFLUENZA 10/27/2015, 09/21/2016, 10/23/2017, 11/10/2017    Influenza Split High Dose Preservative Free IM 10/27/2015, 09/21/2016, 11/10/2017    Influenza TIV (IM) 01/24/2013, 10/22/2013    Influenza, high dose seasonal 0 7 mL 11/19/2018, 10/16/2019    Pneumococcal Conjugate 13-Valent 09/21/2016    Pneumococcal Polysaccharide PPV23 05/12/2016      Health Maintenance:         Topic Date Due    DXA SCAN  06/06/2007    MAMMOGRAM  06/02/2012    Hepatitis C Screening  Completed         Topic Date Due    Influenza Vaccine  07/01/2020      Medicare Health Risk Assessment:     /78 Pulse 64   Temp 99 7 °F (37 6 °C)   Ht 4' 11" (1 499 m)   Wt 81 2 kg (179 lb)   SpO2 93%   BMI 36 15 kg/m²      Carole Hernandez is here for her Subsequent Wellness visit  Last Medicare Wellness visit information reviewed, patient interviewed, no change since last AWV  Health Risk Assessment:   Patient rates overall health as fair  Patient feels that their physical health rating is same  Eyesight was rated as same  Hearing was rated as same  Patient feels that their emotional and mental health rating is same  Pain experienced in the last 7 days has been none  Patient states that she has experienced no weight loss or gain in last 6 months  Depression Screening:   PHQ-2 Score: 0  PHQ-9 Score: 8      Fall Risk Screening: In the past year, patient has experienced: no history of falling in past year      Urinary Incontinence Screening:   Patient has not leaked urine accidently in the last six months  Home Safety:  Patient does not have trouble with stairs inside or outside of their home  Patient has working smoke alarms and has working carbon monoxide detector  Home safety hazards include: none  Nutrition:   Current diet is Diabetic and Limited junk food  Medications:   Patient is currently taking over-the-counter supplements  OTC medications include: see medication list  Patient is able to manage medications  Activities of Daily Living (ADLs)/Instrumental Activities of Daily Living (IADLs):   Walk and transfer into and out of bed and chair?: Yes  Dress and groom yourself?: Yes    Bathe or shower yourself?: Yes    Feed yourself?  Yes  Do your laundry/housekeeping?: Yes  Manage your money, pay your bills and track your expenses?: Yes  Make your own meals?: Yes    Do your own shopping?: Yes    Previous Hospitalizations:   Any hospitalizations or ED visits within the last 12 months?: No      Advance Care Planning:   Living will: No    Durable POA for healthcare: No    Advanced directive: No    Advanced directive counseling given: Yes    Five wishes given: Yes    Patient declined ACP directive: No    End of Life Decisions reviewed with patient: Yes    Provider agrees with end of life decisions: Yes      Cognitive Screening:   Provider or family/friend/caregiver concerned regarding cognition?: No    PREVENTIVE SCREENINGS      Cardiovascular Screening:    General: Screening Not Indicated and History Lipid Disorder      Diabetes Screening:     General: Screening Not Indicated and History Diabetes      Colorectal Cancer Screening:     General: Screening Current      Cervical Cancer Screening:    General: Screening Not Indicated      Osteoporosis Screening:    General: Screening Current      Abdominal Aortic Aneurysm (AAA) Screening:        General: Screening Current and Screening Not Indicated      Lung Cancer Screening:     General: Screening Not Indicated      Hepatitis C Screening:    General: Screening Current    BMI Counseling: Body mass index is 36 15 kg/m²  The BMI is above normal  Nutrition recommendations include moderation in carbohydrate intake  Depression Screening Follow-up Plan: Patient's depression screening was positive with a PHQ-2 score of 0  Their PHQ-9 score was 8  Patient assessed for underlying major depression  They have no active suicidal ideations  Brief counseling provided and recommend additional follow-up/re-evaluation next office visit    Donta Mast DO

## 2020-09-10 NOTE — PROGRESS NOTES
Patient ID: Nabila Albrecht is a 68 y o  female  HPI: 68 y  o female presents for follow up of niddm, hypertension and hypercholesterolemia  Hgba1c is  5 8  and patient's issues are well controlled  Patient deneis any dyspnea, chest pain or palpitations  She denies any seizure activity        SUBJECTIVE    Family History   Problem Relation Age of Onset    Depression Maternal Uncle     Diabetes Maternal Uncle      Social History     Socioeconomic History    Marital status: Single     Spouse name: Not on file    Number of children: Not on file    Years of education: completed 12th grade    Highest education level: Not on file   Occupational History    Not on file   Social Needs    Financial resource strain: Not on file    Food insecurity     Worry: Not on file     Inability: Not on file    Transportation needs     Medical: Not on file     Non-medical: Not on file   Tobacco Use    Smoking status: Never Smoker    Smokeless tobacco: Never Used   Substance and Sexual Activity    Alcohol use: Yes     Comment: social    Drug use: Never    Sexual activity: Not Currently   Lifestyle    Physical activity     Days per week: Not on file     Minutes per session: Not on file    Stress: Not on file   Relationships    Social connections     Talks on phone: Not on file     Gets together: Not on file     Attends Yazidism service: Not on file     Active member of club or organization: Not on file     Attends meetings of clubs or organizations: Not on file     Relationship status: Not on file    Intimate partner violence     Fear of current or ex partner: Not on file     Emotionally abused: Not on file     Physically abused: Not on file     Forced sexual activity: Not on file   Other Topics Concern    Not on file   Social History Narrative    Daily coffee consumption: ____cp/day    Daily cola consumption: ___ can/day    daily tea consumption: _____cp/day     Past Medical History:   Diagnosis Date    Anxiety     Depression     Hyperlipidemia     Stroke [facility])      Past Surgical History:   Procedure Laterality Date    EYE SURGERY      cosmetic     Allergies   Allergen Reactions    Statins      myalgia       Current Outpatient Medications:     ALPRAZolam (XANAX) 0 25 mg tablet, Take 1 tablet (0 25 mg total) by mouth 3 (three) times a day as needed for anxiety, Disp: 90 tablet, Rfl: 0    citalopram (CeleXA) 20 mg tablet, Take one and one half of a tablet daily (30 mg total ), Disp: 135 tablet, Rfl: 1    Krill Oil 1000 MG CAPS, Take 2 capsules (2,000 mg total) by mouth daily, Disp: 60 capsule, Rfl: 3    levothyroxine 25 mcg tablet, Take 1 tablet (25 mcg total) by mouth daily, Disp: 90 tablet, Rfl: 0    Review of Systems  Constitutional:     Denies fever, chills ,fatigue ,weakness ,weight loss, weight gain     ENT: Denies earache ,loss of hearing ,nosebleed, nasal discharge,nasal congestion ,sore throat ,hoarseness  Pulmonary: Denies shortness of breath ,cough  ,dyspnea on exertion, orthopnea  ,PND   Cardiovascular:  Denies bradycardia , tachycardia  ,palpations, lower extremity edema leg, claudication  Breast:  Denies new or changing breast lumps ,nipple discharge ,nipple changes  Abdomen:  Denies abdominal pain , anorexia , indigestion, nausea, vomiting, constipation, diarrhea  Musculoskeletal: Denies myalgias, arthralgias, joint swelling, joint stiffness , limb pain, limb swelling  Gu: denies dysuria, polyuria  Skin: Denies skin rash, skin lesion, skin wound, itching, dry skin  Neuro: Denies headache, numbness, tingling, confusion, loss of consciousness, dizziness, vertigo  Psychiatric: Denies feelings of depression, suicidal ideation, anxiety, sleep disturbances    OBJECTIVE  /78   Pulse 64   Temp 99 7 °F (37 6 °C)   Ht 4' 11" (1 499 m)   Wt 81 2 kg (179 lb)   SpO2 93%   BMI 36 15 kg/m²   Constitutional:   NAD, well appearing and well nourished      ENT:   Conjunctiva and lids: no injection, edema, or discharge     Pupils and iris: HENRY bilaterally    External inspection of ears and nose: normal without deformities or discharge  Otoscopic exam: Canals patent without erythema  Nasal mucosa, septum and turbinates: Normal or edema or discharge         Oropharynx:  Moist mucosa, normal tongue and tonsils without lesions  No erythema        Pulmonary:Respiratory effort normal rate and rhythm, no increased work of breathing  Auscultation of lungs:  Clear bilaterally with no adventitious breath sounds       Cardiovascular: regular rate and rhythm, S1 and S2, no murmur, no edema and/or varicosities of LE      Abdomen: Soft and non-distended     Positive bowel sounds    No heptomegaly or splenomegaly      Gu: no suprapubic tenderness or CVA tenderness, no urethral discharge  Lymphatic:  No anterior or posterior cervical lymphadenopathy         Musculoskeletal:  Gait and station: Normal gait      Digits and nails normal without clubbing or cyanosis       Inspection/palpation of joints, bones, and muscles:  No joint tenderness, swelling, full active and passive range of motion       Skin: Normal skin turgor and no rashes      Neuro:    Normal reflexes     Psych:   alert and oriented to person, place and time     normal mood and affect   Patient's shoes and socks removed  Right Foot/Ankle   Right Foot Inspection  Skin Exam: skin normal and skin intact no dry skin, no warmth, no callus, no erythema, no maceration, no abnormal color, no pre-ulcer, no ulcer and no callus                          Toe Exam: ROM and strength within normal limitsno swelling, no tenderness and erythema  Sensory   Vibration: intact  Proprioception: intact   Monofilament testing: intact  Vascular  Capillary refills: < 3 seconds  The right DP pulse is 2+  The right PT pulse is 2+       Left Foot/Ankle  Left Foot Inspection  Skin Exam: skin normal and skin intactno dry skin, no warmth, no erythema, no maceration, normal color, no pre-ulcer, no ulcer and no callus                         Toe Exam: ROM and strength within normal limitsno swelling, no tenderness, no erythema and no left toe deformity                   Sensory   Vibration: intact  Proprioception: intact  Monofilament: intact  Vascular  Capillary refills: < 3 seconds  The left DP pulse is 2+  The left PT pulse is 2+  Assign Risk Category:  No deformity present; No loss of protective sensation; No weak pulses       Risk: 0      Assessment/Plan:Diagnoses and all orders for this visit:    Type 2 diabetes mellitus without complication, without long-term current use of insulin (HCC)  -     POCT hemoglobin A1c  -     Microalbumin / creatinine urine ratio    Essential hypertension  -     Comprehensive metabolic panel; Future    Hypercholesterolemia  -     Lipid Panel with Direct LDL reflex; Future    Hypothyroidism, unspecified type  -     TSH, 3rd generation; Future      I will see patient back in 4 mos or sooner prn

## 2020-09-17 ENCOUNTER — APPOINTMENT (OUTPATIENT)
Dept: LAB | Facility: CLINIC | Age: 70
End: 2020-09-17
Payer: MEDICARE

## 2020-09-17 DIAGNOSIS — I10 ESSENTIAL HYPERTENSION: ICD-10-CM

## 2020-09-17 DIAGNOSIS — E78.00 HYPERCHOLESTEROLEMIA: ICD-10-CM

## 2020-09-17 DIAGNOSIS — E03.9 HYPOTHYROIDISM, UNSPECIFIED TYPE: ICD-10-CM

## 2020-09-17 LAB
ALBUMIN SERPL BCP-MCNC: 3.9 G/DL (ref 3.5–5)
ALP SERPL-CCNC: 71 U/L (ref 46–116)
ALT SERPL W P-5'-P-CCNC: 71 U/L (ref 12–78)
ANION GAP SERPL CALCULATED.3IONS-SCNC: 10 MMOL/L (ref 4–13)
AST SERPL W P-5'-P-CCNC: 48 U/L (ref 5–45)
BILIRUB SERPL-MCNC: 0.37 MG/DL (ref 0.2–1)
BUN SERPL-MCNC: 15 MG/DL (ref 5–25)
CALCIUM SERPL-MCNC: 9.2 MG/DL (ref 8.3–10.1)
CHLORIDE SERPL-SCNC: 108 MMOL/L (ref 100–108)
CHOLEST SERPL-MCNC: 201 MG/DL (ref 50–200)
CO2 SERPL-SCNC: 22 MMOL/L (ref 21–32)
CREAT SERPL-MCNC: 1.01 MG/DL (ref 0.6–1.3)
GFR SERPL CREATININE-BSD FRML MDRD: 56 ML/MIN/1.73SQ M
GLUCOSE P FAST SERPL-MCNC: 128 MG/DL (ref 65–99)
HDLC SERPL-MCNC: 35 MG/DL
LDLC SERPL CALC-MCNC: 133 MG/DL (ref 0–100)
POTASSIUM SERPL-SCNC: 3.7 MMOL/L (ref 3.5–5.3)
PROT SERPL-MCNC: 7.7 G/DL (ref 6.4–8.2)
SODIUM SERPL-SCNC: 140 MMOL/L (ref 136–145)
TRIGL SERPL-MCNC: 164 MG/DL
TSH SERPL DL<=0.05 MIU/L-ACNC: 2.14 UIU/ML (ref 0.36–3.74)

## 2020-09-17 PROCEDURE — 80053 COMPREHEN METABOLIC PANEL: CPT

## 2020-09-17 PROCEDURE — 36415 COLL VENOUS BLD VENIPUNCTURE: CPT

## 2020-09-17 PROCEDURE — 80061 LIPID PANEL: CPT

## 2020-09-17 PROCEDURE — 84443 ASSAY THYROID STIM HORMONE: CPT

## 2020-10-09 ENCOUNTER — IMMUNIZATIONS (OUTPATIENT)
Dept: FAMILY MEDICINE CLINIC | Facility: CLINIC | Age: 70
End: 2020-10-09
Payer: MEDICARE

## 2020-10-09 DIAGNOSIS — F32.A DEPRESSION, UNSPECIFIED DEPRESSION TYPE: ICD-10-CM

## 2020-10-09 DIAGNOSIS — Z23 ENCOUNTER FOR IMMUNIZATION: ICD-10-CM

## 2020-10-09 PROCEDURE — G0008 ADMIN INFLUENZA VIRUS VAC: HCPCS | Performed by: FAMILY MEDICINE

## 2020-10-09 PROCEDURE — 90662 IIV NO PRSV INCREASED AG IM: CPT | Performed by: FAMILY MEDICINE

## 2020-10-09 RX ORDER — CITALOPRAM 20 MG/1
TABLET ORAL
Qty: 135 TABLET | Refills: 1 | Status: SHIPPED | OUTPATIENT
Start: 2020-10-09 | End: 2021-04-13 | Stop reason: SDUPTHER

## 2020-10-16 DIAGNOSIS — E03.9 HYPOTHYROIDISM, UNSPECIFIED TYPE: ICD-10-CM

## 2020-10-16 RX ORDER — LEVOTHYROXINE SODIUM 0.03 MG/1
25 TABLET ORAL DAILY
Qty: 90 TABLET | Refills: 0 | Status: SHIPPED | OUTPATIENT
Start: 2020-10-16 | End: 2021-01-18 | Stop reason: SDUPTHER

## 2021-01-18 DIAGNOSIS — E03.9 HYPOTHYROIDISM, UNSPECIFIED TYPE: ICD-10-CM

## 2021-01-19 RX ORDER — LEVOTHYROXINE SODIUM 0.03 MG/1
25 TABLET ORAL DAILY
Qty: 90 TABLET | Refills: 0 | Status: SHIPPED | OUTPATIENT
Start: 2021-01-19 | End: 2021-04-13 | Stop reason: SDUPTHER

## 2021-03-02 DIAGNOSIS — Z23 ENCOUNTER FOR IMMUNIZATION: ICD-10-CM

## 2021-03-07 ENCOUNTER — IMMUNIZATIONS (OUTPATIENT)
Dept: FAMILY MEDICINE CLINIC | Facility: HOSPITAL | Age: 71
End: 2021-03-07

## 2021-03-07 DIAGNOSIS — Z23 ENCOUNTER FOR IMMUNIZATION: Primary | ICD-10-CM

## 2021-03-07 PROCEDURE — 91300 SARS-COV-2 / COVID-19 MRNA VACCINE (PFIZER-BIONTECH) 30 MCG: CPT

## 2021-03-07 PROCEDURE — 0001A SARS-COV-2 / COVID-19 MRNA VACCINE (PFIZER-BIONTECH) 30 MCG: CPT

## 2021-03-29 ENCOUNTER — IMMUNIZATIONS (OUTPATIENT)
Dept: FAMILY MEDICINE CLINIC | Facility: HOSPITAL | Age: 71
End: 2021-03-29

## 2021-03-29 DIAGNOSIS — Z23 ENCOUNTER FOR IMMUNIZATION: Primary | ICD-10-CM

## 2021-03-29 PROCEDURE — 0002A SARS-COV-2 / COVID-19 MRNA VACCINE (PFIZER-BIONTECH) 30 MCG: CPT

## 2021-03-29 PROCEDURE — 91300 SARS-COV-2 / COVID-19 MRNA VACCINE (PFIZER-BIONTECH) 30 MCG: CPT

## 2021-03-30 ENCOUNTER — TELEPHONE (OUTPATIENT)
Dept: FAMILY MEDICINE CLINIC | Facility: CLINIC | Age: 71
End: 2021-03-30

## 2021-03-30 ENCOUNTER — TRANSCRIBE ORDERS (OUTPATIENT)
Dept: LAB | Facility: CLINIC | Age: 71
End: 2021-03-30

## 2021-03-30 DIAGNOSIS — E03.9 HYPOTHYROIDISM, UNSPECIFIED TYPE: Primary | ICD-10-CM

## 2021-03-30 DIAGNOSIS — E11.9 TYPE 2 DIABETES MELLITUS WITHOUT COMPLICATION, WITHOUT LONG-TERM CURRENT USE OF INSULIN (HCC): ICD-10-CM

## 2021-03-30 DIAGNOSIS — E78.00 HYPERCHOLESTEROLEMIA: ICD-10-CM

## 2021-03-30 NOTE — TELEPHONE ENCOUNTER
Patient called  She has a follow up with you on April 13th  She would like you to order labs for her to have done prior  Please enter and I will let her know when they are in

## 2021-04-09 ENCOUNTER — APPOINTMENT (OUTPATIENT)
Dept: LAB | Facility: CLINIC | Age: 71
End: 2021-04-09
Payer: COMMERCIAL

## 2021-04-09 DIAGNOSIS — E11.9 TYPE 2 DIABETES MELLITUS WITHOUT COMPLICATION, WITHOUT LONG-TERM CURRENT USE OF INSULIN (HCC): ICD-10-CM

## 2021-04-09 DIAGNOSIS — E78.00 HYPERCHOLESTEROLEMIA: ICD-10-CM

## 2021-04-09 DIAGNOSIS — E03.9 HYPOTHYROIDISM, UNSPECIFIED TYPE: ICD-10-CM

## 2021-04-09 LAB
ALBUMIN SERPL BCP-MCNC: 3.8 G/DL (ref 3.5–5)
ALP SERPL-CCNC: 69 U/L (ref 46–116)
ALT SERPL W P-5'-P-CCNC: 73 U/L (ref 12–78)
ANION GAP SERPL CALCULATED.3IONS-SCNC: 6 MMOL/L (ref 4–13)
AST SERPL W P-5'-P-CCNC: 45 U/L (ref 5–45)
BILIRUB SERPL-MCNC: 0.74 MG/DL (ref 0.2–1)
BUN SERPL-MCNC: 19 MG/DL (ref 5–25)
CALCIUM SERPL-MCNC: 8.9 MG/DL (ref 8.3–10.1)
CHLORIDE SERPL-SCNC: 107 MMOL/L (ref 100–108)
CHOLEST SERPL-MCNC: 175 MG/DL (ref 50–200)
CO2 SERPL-SCNC: 26 MMOL/L (ref 21–32)
CREAT SERPL-MCNC: 1.09 MG/DL (ref 0.6–1.3)
EST. AVERAGE GLUCOSE BLD GHB EST-MCNC: 128 MG/DL
GFR SERPL CREATININE-BSD FRML MDRD: 51 ML/MIN/1.73SQ M
GLUCOSE P FAST SERPL-MCNC: 131 MG/DL (ref 65–99)
HBA1C MFR BLD: 6.1 %
HDLC SERPL-MCNC: 37 MG/DL
LDLC SERPL CALC-MCNC: 112 MG/DL (ref 0–100)
POTASSIUM SERPL-SCNC: 3.6 MMOL/L (ref 3.5–5.3)
PROT SERPL-MCNC: 7.7 G/DL (ref 6.4–8.2)
SODIUM SERPL-SCNC: 139 MMOL/L (ref 136–145)
TRIGL SERPL-MCNC: 132 MG/DL
TSH SERPL DL<=0.05 MIU/L-ACNC: 1.81 UIU/ML (ref 0.36–3.74)

## 2021-04-09 PROCEDURE — 80053 COMPREHEN METABOLIC PANEL: CPT

## 2021-04-09 PROCEDURE — 84443 ASSAY THYROID STIM HORMONE: CPT

## 2021-04-09 PROCEDURE — 83036 HEMOGLOBIN GLYCOSYLATED A1C: CPT

## 2021-04-09 PROCEDURE — 80061 LIPID PANEL: CPT

## 2021-04-09 PROCEDURE — 36415 COLL VENOUS BLD VENIPUNCTURE: CPT

## 2021-04-13 ENCOUNTER — OFFICE VISIT (OUTPATIENT)
Dept: FAMILY MEDICINE CLINIC | Facility: CLINIC | Age: 71
End: 2021-04-13
Payer: COMMERCIAL

## 2021-04-13 VITALS
OXYGEN SATURATION: 96 % | HEART RATE: 74 BPM | TEMPERATURE: 98.8 F | DIASTOLIC BLOOD PRESSURE: 82 MMHG | SYSTOLIC BLOOD PRESSURE: 122 MMHG | WEIGHT: 178 LBS | BODY MASS INDEX: 35.88 KG/M2 | HEIGHT: 59 IN

## 2021-04-13 DIAGNOSIS — F33.42 RECURRENT MAJOR DEPRESSIVE DISORDER, IN FULL REMISSION (HCC): ICD-10-CM

## 2021-04-13 DIAGNOSIS — E03.9 HYPOTHYROIDISM, UNSPECIFIED TYPE: ICD-10-CM

## 2021-04-13 DIAGNOSIS — E66.01 OBESITY, MORBID (HCC): ICD-10-CM

## 2021-04-13 DIAGNOSIS — E11.9 TYPE 2 DIABETES MELLITUS WITHOUT COMPLICATION, WITHOUT LONG-TERM CURRENT USE OF INSULIN (HCC): Primary | ICD-10-CM

## 2021-04-13 DIAGNOSIS — N32.81 OVERACTIVE BLADDER: ICD-10-CM

## 2021-04-13 DIAGNOSIS — E78.00 HYPERCHOLESTEROLEMIA: ICD-10-CM

## 2021-04-13 DIAGNOSIS — F41.9 ANXIETY: ICD-10-CM

## 2021-04-13 PROBLEM — F33.9 DEPRESSION, RECURRENT (HCC): Status: ACTIVE | Noted: 2021-04-13

## 2021-04-13 PROCEDURE — 99215 OFFICE O/P EST HI 40 MIN: CPT | Performed by: FAMILY MEDICINE

## 2021-04-13 RX ORDER — LEVOTHYROXINE SODIUM 0.03 MG/1
25 TABLET ORAL DAILY
Qty: 90 TABLET | Refills: 0 | Status: SHIPPED | OUTPATIENT
Start: 2021-04-13 | End: 2021-07-22

## 2021-04-13 RX ORDER — ALPRAZOLAM 0.25 MG/1
0.25 TABLET ORAL 3 TIMES DAILY PRN
Qty: 90 TABLET | Refills: 0 | Status: SHIPPED | OUTPATIENT
Start: 2021-04-13

## 2021-04-13 RX ORDER — TOLTERODINE 4 MG/1
4 CAPSULE, EXTENDED RELEASE ORAL DAILY
Qty: 30 CAPSULE | Refills: 3 | Status: SHIPPED | OUTPATIENT
Start: 2021-04-13 | End: 2021-08-14 | Stop reason: SDUPTHER

## 2021-04-13 RX ORDER — CHLORAL HYDRATE 500 MG
1000 CAPSULE ORAL DAILY
Qty: 30 CAPSULE | Refills: 0
Start: 2021-04-13 | End: 2022-06-13

## 2021-04-13 RX ORDER — CITALOPRAM 20 MG/1
TABLET ORAL
Qty: 135 TABLET | Refills: 1 | Status: SHIPPED | OUTPATIENT
Start: 2021-04-13 | End: 2021-10-19 | Stop reason: SDUPTHER

## 2021-04-20 NOTE — PROGRESS NOTES
Patient ID: Leonora Wolfe is a 65 y o  female  HPI: 65 y  o female presents for follow up of niddm, hypothyroidism and hypercholesterolemia  Hgba1c is 6 1   and patient's issues are well controlled  Patient deneis any dyspnea, chest pain or palpitations  She has overactive bladder and would like to try a different med to hel with this problem  She states her anxiety and depression are well controlled at this time  She is starting to walk again and exercise          SUBJECTIVE    Family History   Problem Relation Age of Onset    Depression Maternal Uncle     Diabetes Maternal Uncle      Social History     Socioeconomic History    Marital status: Single     Spouse name: Not on file    Number of children: Not on file    Years of education: completed 12th grade    Highest education level: Not on file   Occupational History    Not on file   Social Needs    Financial resource strain: Not on file    Food insecurity     Worry: Not on file     Inability: Not on file    Transportation needs     Medical: Not on file     Non-medical: Not on file   Tobacco Use    Smoking status: Never Smoker    Smokeless tobacco: Never Used   Substance and Sexual Activity    Alcohol use: Yes     Comment: social    Drug use: Never    Sexual activity: Not Currently   Lifestyle    Physical activity     Days per week: Not on file     Minutes per session: Not on file    Stress: Not on file   Relationships    Social connections     Talks on phone: Not on file     Gets together: Not on file     Attends Moravian service: Not on file     Active member of club or organization: Not on file     Attends meetings of clubs or organizations: Not on file     Relationship status: Not on file    Intimate partner violence     Fear of current or ex partner: Not on file     Emotionally abused: Not on file     Physically abused: Not on file     Forced sexual activity: Not on file   Other Topics Concern    Not on file   Social History Narrative Daily coffee consumption: ____cp/day    Daily cola consumption: ___ can/day    daily tea consumption: _____cp/day     Past Medical History:   Diagnosis Date    Anxiety     Depression     Hyperlipidemia     Stroke [facility])      Past Surgical History:   Procedure Laterality Date    EYE SURGERY      cosmetic     Allergies   Allergen Reactions    Statins      myalgia       Current Outpatient Medications:     ALPRAZolam (XANAX) 0 25 mg tablet, Take 1 tablet (0 25 mg total) by mouth 3 (three) times a day as needed for anxiety, Disp: 90 tablet, Rfl: 0    citalopram (CeleXA) 20 mg tablet, Take one and one half of a tablet daily (30 mg total ), Disp: 135 tablet, Rfl: 1    levothyroxine 25 mcg tablet, Take 1 tablet (25 mcg total) by mouth daily, Disp: 90 tablet, Rfl: 0    Omega-3 Fatty Acids (fish oil) 1,000 mg, Take 1 capsule (1,000 mg total) by mouth daily, Disp: 30 capsule, Rfl: 0    tolterodine (DETROL LA) 4 mg 24 hr capsule, Take 1 capsule (4 mg total) by mouth daily, Disp: 30 capsule, Rfl: 3    Review of Systems  Constitutional:     Denies fever, chills ,fatigue ,weakness ,weight loss, weight gain     ENT: Denies earache ,loss of hearing ,nosebleed, nasal discharge,nasal congestion ,sore throat ,hoarseness  Pulmonary: Denies shortness of breath ,cough  ,dyspnea on exertion, orthopnea  ,PND   Cardiovascular:  Denies bradycardia , tachycardia  ,palpations, lower extremity edema leg, claudication  Breast:  Denies new or changing breast lumps ,nipple discharge ,nipple changes  Abdomen:  Denies abdominal pain , anorexia , indigestion, nausea, vomiting, constipation, diarrhea  Musculoskeletal: Denies myalgias, arthralgias, joint swelling, joint stiffness , limb pain, limb swelling  Gu: denies dysuria,+ polyuria  Skin: Denies skin rash, skin lesion, skin wound, itching, dry skin  Neuro: Denies headache, numbness, tingling, confusion, loss of consciousness, dizziness, vertigo  Psychiatric: Denies feelings of depression, suicidal ideation, anxiety, sleep disturbances    OBJECTIVE  /82   Pulse 74   Temp 98 8 °F (37 1 °C)   Ht 4' 11" (1 499 m)   Wt 80 7 kg (178 lb)   SpO2 96%   BMI 35 95 kg/m²   Constitutional:   NAD, well appearing and well nourished      ENT:   Conjunctiva and lids: no injection, edema, or discharge     Pupils and iris: HENRY bilaterally    External inspection of ears and nose: normal without deformities or discharge  Otoscopic exam: Canals patent without erythema  Nasal mucosa, septum and turbinates: Normal or edema or discharge         Oropharynx:  Moist mucosa, normal tongue and tonsils without lesions  No erythema        Pulmonary:Respiratory effort normal rate and rhythm, no increased work of breathing  Auscultation of lungs:  Clear bilaterally with no adventitious breath sounds       Cardiovascular: regular rate and rhythm, S1 and S2, no murmur, no edema and/or varicosities of LE      Abdomen: Soft and non-distended     Positive bowel sounds      No heptomegaly or splenomegaly      Gu: no suprapubic tenderness or CVA tenderness, no urethral discharge  Lymphatic:  No anterior or posterior cervical lymphadenopathy         Musculoskeletal:  Gait and station: Normal gait      Digits and nails normal without clubbing or cyanosis       Inspection/palpation of joints, bones, and muscles:  No joint tenderness, swelling, full active and passive range of motion       Skin: Normal skin turgor and no rashes      Neuro:    Normal reflexes     Psych:   alert and oriented to person, place and time     normal mood and affect       Assessment/Plan:Diagnoses and all orders for this visit:    Type 2 diabetes mellitus without complication, without long-term current use of insulin ([facility] [address] )  Comments:  WEll controlled on currcatheriner thearpy  Orders:  -     HEMOGLOBIN A1C W/ EAG ESTIMATION; Future  -     Comprehensive metabolic panel;  Future    Hypercholesterolemia  Comments:  Continue fish oil therpay Orders:  -     Omega-3 Fatty Acids (fish oil) 1,000 mg; Take 1 capsule (1,000 mg total) by mouth daily  -     Lipid Panel with Direct LDL reflex; Future    Hypothyroidism, unspecified type  Comments:  Continue levothyroxine therapy   Orders:  -     levothyroxine 25 mcg tablet; Take 1 tablet (25 mcg total) by mouth daily  -     TSH, 3rd generation; Future    Overactive bladder  -     tolterodine (DETROL LA) 4 mg 24 hr capsule; Take 1 capsule (4 mg total) by mouth daily    Anxiety  -     ALPRAZolam (XANAX) 0 25 mg tablet; Take 1 tablet (0 25 mg total) by mouth 3 (three) times a day as needed for anxiety    Recurrent major depressive disorder, in full remission (HCC)  -     citalopram (CeleXA) 20 mg tablet; Take one and one half of a tablet daily (30 mg total )    Obesity, morbid ([facility] [address] )        I will see patient back in 4 mos or sooner prn

## 2021-07-22 DIAGNOSIS — E03.9 HYPOTHYROIDISM, UNSPECIFIED TYPE: ICD-10-CM

## 2021-07-22 RX ORDER — LEVOTHYROXINE SODIUM 0.03 MG/1
TABLET ORAL
Qty: 90 TABLET | Refills: 0 | Status: SHIPPED | OUTPATIENT
Start: 2021-07-22 | End: 2021-10-19 | Stop reason: SDUPTHER

## 2021-08-08 ENCOUNTER — RA CDI HCC (OUTPATIENT)
Dept: OTHER | Facility: HOSPITAL | Age: 71
End: 2021-08-08

## 2021-08-08 NOTE — PROGRESS NOTES
[facility] [employer]  coding opportunities             Chart Reviewed * (Number of) Inbasket suggestions sent to Provider: 2                  Patients insurance company: [employer]     Visit status: Provider canceled the appointment     Provider never responded to [facility] [employer]  coding request     [facility] [employer]  coding opportunities        8/16     Chart reviewed, (number of) suggestions sent to provider: 2    N18 31  Chronic kidney disease, stage 3a    E11 22  Type 2 diabetes mellitus with diabetic chronic kidney disease                  Patients insurance company: [employer]

## 2021-08-13 ENCOUNTER — APPOINTMENT (OUTPATIENT)
Dept: LAB | Facility: CLINIC | Age: 71
End: 2021-08-13
Payer: COMMERCIAL

## 2021-08-13 DIAGNOSIS — E11.9 TYPE 2 DIABETES MELLITUS WITHOUT COMPLICATION, WITHOUT LONG-TERM CURRENT USE OF INSULIN (HCC): ICD-10-CM

## 2021-08-13 DIAGNOSIS — E03.9 HYPOTHYROIDISM, UNSPECIFIED TYPE: ICD-10-CM

## 2021-08-13 DIAGNOSIS — E78.00 HYPERCHOLESTEROLEMIA: ICD-10-CM

## 2021-08-13 LAB
ALBUMIN SERPL BCP-MCNC: 3.7 G/DL (ref 3.5–5)
ALP SERPL-CCNC: 73 U/L (ref 46–116)
ALT SERPL W P-5'-P-CCNC: 99 U/L (ref 12–78)
ANION GAP SERPL CALCULATED.3IONS-SCNC: 7 MMOL/L (ref 4–13)
AST SERPL W P-5'-P-CCNC: 57 U/L (ref 5–45)
BILIRUB SERPL-MCNC: 0.48 MG/DL (ref 0.2–1)
BUN SERPL-MCNC: 18 MG/DL (ref 5–25)
CALCIUM SERPL-MCNC: 9.4 MG/DL (ref 8.3–10.1)
CHLORIDE SERPL-SCNC: 108 MMOL/L (ref 100–108)
CHOLEST SERPL-MCNC: 208 MG/DL (ref 50–200)
CO2 SERPL-SCNC: 25 MMOL/L (ref 21–32)
CREAT SERPL-MCNC: 1.07 MG/DL (ref 0.6–1.3)
CREAT UR-MCNC: 192 MG/DL
EST. AVERAGE GLUCOSE BLD GHB EST-MCNC: 126 MG/DL
GFR SERPL CREATININE-BSD FRML MDRD: 52 ML/MIN/1.73SQ M
GLUCOSE P FAST SERPL-MCNC: 134 MG/DL (ref 65–99)
HBA1C MFR BLD: 6 %
HDLC SERPL-MCNC: 34 MG/DL
LDLC SERPL CALC-MCNC: 129 MG/DL (ref 0–100)
MICROALBUMIN UR-MCNC: 16.6 MG/L (ref 0–20)
MICROALBUMIN/CREAT 24H UR: 9 MG/G CREATININE (ref 0–30)
POTASSIUM SERPL-SCNC: 4.3 MMOL/L (ref 3.5–5.3)
PROT SERPL-MCNC: 7.7 G/DL (ref 6.4–8.2)
SODIUM SERPL-SCNC: 140 MMOL/L (ref 136–145)
TRIGL SERPL-MCNC: 227 MG/DL
TSH SERPL DL<=0.05 MIU/L-ACNC: 2 UIU/ML (ref 0.36–3.74)

## 2021-08-13 PROCEDURE — 82043 UR ALBUMIN QUANTITATIVE: CPT | Performed by: FAMILY MEDICINE

## 2021-08-13 PROCEDURE — 36415 COLL VENOUS BLD VENIPUNCTURE: CPT

## 2021-08-13 PROCEDURE — 80061 LIPID PANEL: CPT

## 2021-08-13 PROCEDURE — 80053 COMPREHEN METABOLIC PANEL: CPT

## 2021-08-13 PROCEDURE — 82570 ASSAY OF URINE CREATININE: CPT | Performed by: FAMILY MEDICINE

## 2021-08-13 PROCEDURE — 83036 HEMOGLOBIN GLYCOSYLATED A1C: CPT

## 2021-08-13 PROCEDURE — 84443 ASSAY THYROID STIM HORMONE: CPT

## 2021-08-14 DIAGNOSIS — N32.81 OVERACTIVE BLADDER: ICD-10-CM

## 2021-08-16 RX ORDER — TOLTERODINE 4 MG/1
4 CAPSULE, EXTENDED RELEASE ORAL DAILY
Qty: 30 CAPSULE | Refills: 0 | Status: SHIPPED | OUTPATIENT
Start: 2021-08-16 | End: 2021-09-15 | Stop reason: SDUPTHER

## 2021-08-25 ENCOUNTER — OFFICE VISIT (OUTPATIENT)
Dept: FAMILY MEDICINE CLINIC | Facility: CLINIC | Age: 71
End: 2021-08-25
Payer: COMMERCIAL

## 2021-08-25 VITALS
OXYGEN SATURATION: 98 % | HEIGHT: 59 IN | BODY MASS INDEX: 36.49 KG/M2 | TEMPERATURE: 99 F | SYSTOLIC BLOOD PRESSURE: 118 MMHG | WEIGHT: 181 LBS | HEART RATE: 78 BPM | DIASTOLIC BLOOD PRESSURE: 78 MMHG

## 2021-08-25 DIAGNOSIS — E78.00 HYPERCHOLESTEROLEMIA: ICD-10-CM

## 2021-08-25 DIAGNOSIS — E11.9 TYPE 2 DIABETES MELLITUS WITHOUT COMPLICATION, WITHOUT LONG-TERM CURRENT USE OF INSULIN (HCC): Primary | ICD-10-CM

## 2021-08-25 DIAGNOSIS — N32.81 OVERACTIVE BLADDER: ICD-10-CM

## 2021-08-25 DIAGNOSIS — R53.83 OTHER FATIGUE: ICD-10-CM

## 2021-08-25 DIAGNOSIS — E03.9 HYPOTHYROIDISM, UNSPECIFIED TYPE: ICD-10-CM

## 2021-08-25 PROCEDURE — 99214 OFFICE O/P EST MOD 30 MIN: CPT | Performed by: FAMILY MEDICINE

## 2021-08-30 PROBLEM — N32.81 OVERACTIVE BLADDER: Status: ACTIVE | Noted: 2021-08-30

## 2021-08-30 NOTE — PROGRESS NOTES
Patient ID: Calderon Lyman is a 65 y o  female  HPI: 65 y  o female presents for follow up of niddm, hypothyroidsim, OAB, and hypercholesterolemia  Hgba1c is   6 9  and patient's issues are well controlled  Patient deneis any dyspnea, chest pain or palpitations  SUBJECTIVE    Family History   Problem Relation Age of Onset    Depression Maternal Uncle     Diabetes Maternal Uncle      Social History     Socioeconomic History    Marital status: Single     Spouse name: Not on file    Number of children: Not on file    Years of education: completed 12th grade    Highest education level: Not on file   Occupational History    Not on file   Tobacco Use    Smoking status: Never Smoker    Smokeless tobacco: Never Used   Substance and Sexual Activity    Alcohol use: Yes     Comment: social    Drug use: Never    Sexual activity: Not Currently   Other Topics Concern    Not on file   Social History Narrative    Daily coffee consumption: ____cp/day    Daily cola consumption: ___ can/day    daily tea consumption: _____cp/day     Social Determinants of Health     Financial Resource Strain:     Difficulty of Paying Living Expenses:    Food Insecurity:     Worried About Running Out of Food in the Last Year:     Ran Out of Food in the Last Year:    Transportation Needs:     Lack of Transportation (Medical):      Lack of Transportation (Non-Medical):    Physical Activity:     Days of Exercise per Week:     Minutes of Exercise per Session:    Stress:     Feeling of Stress :    Social Connections:     Frequency of Communication with Friends and Family:     Frequency of Social Gatherings with Friends and Family:     Attends Gnosticist Services:     Active Member of Clubs or Organizations:     Attends Club or Organization Meetings:     Marital Status:    Intimate Partner Violence:     Fear of Current or Ex-Partner:     Emotionally Abused:     Physically Abused:     Sexually Abused:      Past Medical History:   Diagnosis Date    Anxiety     Depression     Hyperlipidemia     Stroke [facility])      Past Surgical History:   Procedure Laterality Date    EYE SURGERY      cosmetic     Allergies   Allergen Reactions    Statins      myalgia       Current Outpatient Medications:     ALPRAZolam (XANAX) 0 25 mg tablet, Take 1 tablet (0 25 mg total) by mouth 3 (three) times a day as needed for anxiety, Disp: 90 tablet, Rfl: 0    citalopram (CeleXA) 20 mg tablet, Take one and one half of a tablet daily (30 mg total ), Disp: 135 tablet, Rfl: 1    levothyroxine 25 mcg tablet, Take 1 tablet by mouth once daily, Disp: 90 tablet, Rfl: 0    Omega-3 Fatty Acids (fish oil) 1,000 mg, Take 1 capsule (1,000 mg total) by mouth daily, Disp: 30 capsule, Rfl: 0    tolterodine (DETROL LA) 4 mg 24 hr capsule, Take 1 capsule (4 mg total) by mouth daily, Disp: 30 capsule, Rfl: 0    Review of Systems  Constitutional:     Denies fever, chills ,fatigue ,weakness ,weight loss, weight gain     ENT: Denies earache ,loss of hearing ,nosebleed, nasal discharge,nasal congestion ,sore throat ,hoarseness  Pulmonary: Denies shortness of breath ,cough  ,dyspnea on exertion, orthopnea  ,PND   Cardiovascular:  Denies bradycardia , tachycardia  ,palpations, lower extremity edema leg, claudication  Breast:  Denies new or changing breast lumps ,nipple discharge ,nipple changes  Abdomen:  Denies abdominal pain , anorexia , indigestion, nausea, vomiting, constipation, diarrhea  Musculoskeletal: Denies myalgias, arthralgias, joint swelling, joint stiffness , limb pain, limb swelling  Gu: denies dysuria, polyuria  Skin: Denies skin rash, skin lesion, skin wound, itching, dry skin  Neuro: Denies headache, numbness, tingling, confusion, loss of consciousness, dizziness, vertigo  Psychiatric: Denies feelings of depression, suicidal ideation, anxiety, sleep disturbances    OBJECTIVE  /78   Pulse 78   Temp 99 °F (37 2 °C)   Ht 4' 11" (1 499 m)   Wt 82 1 kg (181 lb)   SpO2 98%   BMI 36 56 kg/m²   Constitutional:   NAD, well appearing and well nourished      ENT:   Conjunctiva and lids: no injection, edema, or discharge     Pupils and iris: HENRY bilaterally    External inspection of ears and nose: normal without deformities or discharge  Otoscopic exam: Canals patent without erythema  Nasal mucosa, septum and turbinates: Normal or edema or discharge         Oropharynx:  Moist mucosa, normal tongue and tonsils without lesions  No erythema        Pulmonary:Respiratory effort normal rate and rhythm, no increased work of breathing  Auscultation of lungs:  Clear bilaterally with no adventitious breath sounds       Cardiovascular: regular rate and rhythm, S1 and S2, no murmur, no edema and/or varicosities of LE      Abdomen: Soft and non-distended     Positive bowel sounds      No heptomegaly or splenomegaly      Gu: no suprapubic tenderness or CVA tenderness, no urethral discharge  Lymphatic:  No anterior or posterior cervical lymphadenopathy        Musculoskeletal:  Gait and station: Normal gait      Digits and nails normal without clubbing or cyanosis       Inspection/palpation of joints, bones, and muscles:  No joint tenderness, swelling, full active and passive range of motion       Skin: Normal skin turgor and no rashes      Neuro:    Normal reflexes     Psych:   alert and oriented to person, place and time     normal mood and affect       Assessment/Plan:     1  Type 2 diabetes mellitus without complication, without long-term current use of insulin (HCC) E11 9  Change Dx      Comment: Cotninue current therapy        2  Hypercholesterolemia E78 00  Change Dx      Comment: Cotninue fish oil therpay        3     Hypothyroidism, unspecified type E03 9  Change Dx      Comment: Continue levothyroxine tx         4    Overactive bladder N32 81  Change Dx      Comment: Stable on detrol tx         5    Other fatigue R53 83  Change Dx           I will see patient back in 4 mos or sooner prn

## 2021-09-15 DIAGNOSIS — N32.81 OVERACTIVE BLADDER: ICD-10-CM

## 2021-09-15 RX ORDER — TOLTERODINE 4 MG/1
4 CAPSULE, EXTENDED RELEASE ORAL DAILY
Qty: 90 CAPSULE | Refills: 3 | Status: SHIPPED | OUTPATIENT
Start: 2021-09-15 | End: 2021-09-16 | Stop reason: SDUPTHER

## 2021-09-16 DIAGNOSIS — N32.81 OVERACTIVE BLADDER: ICD-10-CM

## 2021-09-16 RX ORDER — TOLTERODINE 4 MG/1
4 CAPSULE, EXTENDED RELEASE ORAL DAILY
Qty: 90 CAPSULE | Refills: 0 | OUTPATIENT
Start: 2021-09-16

## 2021-09-16 RX ORDER — TOLTERODINE 4 MG/1
4 CAPSULE, EXTENDED RELEASE ORAL DAILY
Qty: 90 CAPSULE | Refills: 3 | Status: SHIPPED | OUTPATIENT
Start: 2021-09-16 | End: 2022-06-28 | Stop reason: SDUPTHER

## 2021-10-14 ENCOUNTER — IMMUNIZATIONS (OUTPATIENT)
Dept: FAMILY MEDICINE CLINIC | Facility: HOSPITAL | Age: 71
End: 2021-10-14

## 2021-10-14 DIAGNOSIS — Z23 ENCOUNTER FOR IMMUNIZATION: Primary | ICD-10-CM

## 2021-10-14 PROCEDURE — 0001A SARS-COV-2 / COVID-19 MRNA VACCINE (PFIZER-BIONTECH) 30 MCG: CPT

## 2021-10-14 PROCEDURE — 91300 SARS-COV-2 / COVID-19 MRNA VACCINE (PFIZER-BIONTECH) 30 MCG: CPT

## 2021-10-19 DIAGNOSIS — E03.9 HYPOTHYROIDISM, UNSPECIFIED TYPE: ICD-10-CM

## 2021-10-19 DIAGNOSIS — F33.42 RECURRENT MAJOR DEPRESSIVE DISORDER, IN FULL REMISSION (HCC): ICD-10-CM

## 2021-10-19 RX ORDER — CITALOPRAM 20 MG/1
TABLET ORAL
Qty: 135 TABLET | Refills: 1 | Status: SHIPPED | OUTPATIENT
Start: 2021-10-19 | End: 2022-04-18 | Stop reason: SDUPTHER

## 2021-10-19 RX ORDER — LEVOTHYROXINE SODIUM 0.03 MG/1
25 TABLET ORAL DAILY
Qty: 90 TABLET | Refills: 0 | Status: SHIPPED | OUTPATIENT
Start: 2021-10-19 | End: 2022-01-17 | Stop reason: SDUPTHER

## 2021-12-02 ENCOUNTER — VBI (OUTPATIENT)
Dept: ADMINISTRATIVE | Facility: OTHER | Age: 71
End: 2021-12-02

## 2021-12-06 ENCOUNTER — APPOINTMENT (OUTPATIENT)
Dept: LAB | Facility: CLINIC | Age: 71
End: 2021-12-06
Payer: COMMERCIAL

## 2021-12-06 DIAGNOSIS — R53.83 OTHER FATIGUE: ICD-10-CM

## 2021-12-06 DIAGNOSIS — E78.00 HYPERCHOLESTEROLEMIA: ICD-10-CM

## 2021-12-06 DIAGNOSIS — E03.9 HYPOTHYROIDISM, UNSPECIFIED TYPE: ICD-10-CM

## 2021-12-06 LAB
ALBUMIN SERPL BCP-MCNC: 3.5 G/DL (ref 3.5–5)
ALP SERPL-CCNC: 66 U/L (ref 46–116)
ALT SERPL W P-5'-P-CCNC: 69 U/L (ref 12–78)
ANION GAP SERPL CALCULATED.3IONS-SCNC: 5 MMOL/L (ref 4–13)
AST SERPL W P-5'-P-CCNC: 41 U/L (ref 5–45)
BILIRUB SERPL-MCNC: 0.65 MG/DL (ref 0.2–1)
BUN SERPL-MCNC: 13 MG/DL (ref 5–25)
CALCIUM SERPL-MCNC: 9.1 MG/DL (ref 8.3–10.1)
CHLORIDE SERPL-SCNC: 111 MMOL/L (ref 100–108)
CHOLEST SERPL-MCNC: 204 MG/DL
CO2 SERPL-SCNC: 25 MMOL/L (ref 21–32)
CREAT SERPL-MCNC: 0.99 MG/DL (ref 0.6–1.3)
GFR SERPL CREATININE-BSD FRML MDRD: 57 ML/MIN/1.73SQ M
GLUCOSE P FAST SERPL-MCNC: 138 MG/DL (ref 65–99)
HDLC SERPL-MCNC: 35 MG/DL
LDLC SERPL CALC-MCNC: 126 MG/DL (ref 0–100)
POTASSIUM SERPL-SCNC: 4.1 MMOL/L (ref 3.5–5.3)
PROT SERPL-MCNC: 7.2 G/DL (ref 6.4–8.2)
SODIUM SERPL-SCNC: 141 MMOL/L (ref 136–145)
TRIGL SERPL-MCNC: 213 MG/DL
TSH SERPL DL<=0.05 MIU/L-ACNC: 2 UIU/ML (ref 0.36–3.74)

## 2021-12-06 PROCEDURE — 36415 COLL VENOUS BLD VENIPUNCTURE: CPT

## 2021-12-06 PROCEDURE — 80061 LIPID PANEL: CPT

## 2021-12-06 PROCEDURE — 84443 ASSAY THYROID STIM HORMONE: CPT

## 2021-12-06 PROCEDURE — 80053 COMPREHEN METABOLIC PANEL: CPT

## 2021-12-07 ENCOUNTER — OFFICE VISIT (OUTPATIENT)
Dept: FAMILY MEDICINE CLINIC | Facility: CLINIC | Age: 71
End: 2021-12-07
Payer: COMMERCIAL

## 2021-12-07 VITALS
HEIGHT: 59 IN | BODY MASS INDEX: 36.56 KG/M2 | SYSTOLIC BLOOD PRESSURE: 130 MMHG | HEART RATE: 70 BPM | TEMPERATURE: 98.2 F | OXYGEN SATURATION: 98 % | DIASTOLIC BLOOD PRESSURE: 92 MMHG

## 2021-12-07 DIAGNOSIS — R10.11 RUQ PAIN: ICD-10-CM

## 2021-12-07 DIAGNOSIS — E03.8 OTHER SPECIFIED HYPOTHYROIDISM: ICD-10-CM

## 2021-12-07 DIAGNOSIS — Z00.00 MEDICARE ANNUAL WELLNESS VISIT, SUBSEQUENT: Primary | ICD-10-CM

## 2021-12-07 DIAGNOSIS — E78.2 MIXED HYPERLIPIDEMIA: ICD-10-CM

## 2021-12-07 DIAGNOSIS — E11.9 TYPE 2 DIABETES MELLITUS WITHOUT COMPLICATION, WITHOUT LONG-TERM CURRENT USE OF INSULIN (HCC): ICD-10-CM

## 2021-12-07 DIAGNOSIS — F33.42 RECURRENT MAJOR DEPRESSIVE DISORDER, IN FULL REMISSION (HCC): ICD-10-CM

## 2021-12-07 DIAGNOSIS — Z23 NEED FOR VACCINATION: ICD-10-CM

## 2021-12-07 LAB — SL AMB POCT HEMOGLOBIN AIC: 5.8 (ref ?–6.5)

## 2021-12-07 PROCEDURE — G0008 ADMIN INFLUENZA VIRUS VAC: HCPCS | Performed by: FAMILY MEDICINE

## 2021-12-07 PROCEDURE — 90662 IIV NO PRSV INCREASED AG IM: CPT | Performed by: FAMILY MEDICINE

## 2021-12-07 PROCEDURE — G0439 PPPS, SUBSEQ VISIT: HCPCS | Performed by: FAMILY MEDICINE

## 2021-12-07 PROCEDURE — 99214 OFFICE O/P EST MOD 30 MIN: CPT | Performed by: FAMILY MEDICINE

## 2021-12-07 PROCEDURE — 83036 HEMOGLOBIN GLYCOSYLATED A1C: CPT | Performed by: FAMILY MEDICINE

## 2021-12-07 RX ORDER — MELATONIN
1000 DAILY
COMMUNITY

## 2021-12-07 RX ORDER — CYANOCOBALAMIN (VITAMIN B-12) 500 MCG
1000 TABLET ORAL DAILY
COMMUNITY

## 2021-12-08 PROBLEM — E03.8 OTHER SPECIFIED HYPOTHYROIDISM: Status: ACTIVE | Noted: 2021-12-08

## 2021-12-08 PROBLEM — F33.42 RECURRENT MAJOR DEPRESSIVE DISORDER, IN FULL REMISSION (HCC): Status: ACTIVE | Noted: 2021-04-13

## 2022-01-07 ENCOUNTER — VBI (OUTPATIENT)
Dept: ADMINISTRATIVE | Facility: OTHER | Age: 72
End: 2022-01-07

## 2022-01-17 DIAGNOSIS — E03.9 HYPOTHYROIDISM, UNSPECIFIED TYPE: ICD-10-CM

## 2022-01-17 RX ORDER — LEVOTHYROXINE SODIUM 0.03 MG/1
25 TABLET ORAL DAILY
Qty: 90 TABLET | Refills: 0 | Status: SHIPPED | OUTPATIENT
Start: 2022-01-17 | End: 2022-04-18 | Stop reason: SDUPTHER

## 2022-01-27 ENCOUNTER — VBI (OUTPATIENT)
Dept: ADMINISTRATIVE | Facility: OTHER | Age: 72
End: 2022-01-27

## 2022-03-29 ENCOUNTER — TELEPHONE (OUTPATIENT)
Dept: FAMILY MEDICINE CLINIC | Facility: CLINIC | Age: 72
End: 2022-03-29

## 2022-03-29 NOTE — TELEPHONE ENCOUNTER
Patient walked in to update that after seeing her eye doctor, they are suggesting that patient not be on medication tolterodine 4 mg

## 2022-04-11 ENCOUNTER — ESTABLISHED COMPREHENSIVE EXAM (OUTPATIENT)
Dept: URBAN - METROPOLITAN AREA CLINIC 6 | Facility: CLINIC | Age: 72
End: 2022-04-11

## 2022-04-11 DIAGNOSIS — H40.033: ICD-10-CM

## 2022-04-11 DIAGNOSIS — H35.3130: ICD-10-CM

## 2022-04-11 DIAGNOSIS — H25.811: ICD-10-CM

## 2022-04-11 PROCEDURE — 92014 COMPRE OPH EXAM EST PT 1/>: CPT

## 2022-04-11 PROCEDURE — 92134 CPTRZ OPH DX IMG PST SGM RTA: CPT

## 2022-04-11 PROCEDURE — 92020 GONIOSCOPY: CPT

## 2022-04-11 ASSESSMENT — TONOMETRY
OS_IOP_MMHG: 18
OD_IOP_MMHG: 12
OS_IOP_MMHG: 21
OD_IOP_MMHG: 19

## 2022-04-11 ASSESSMENT — VISUAL ACUITY
OU_SC: J5+1
OS_SC: 20/40+1
OD_SC: 20/25-1

## 2022-04-18 DIAGNOSIS — E03.9 HYPOTHYROIDISM, UNSPECIFIED TYPE: ICD-10-CM

## 2022-04-18 DIAGNOSIS — F33.42 RECURRENT MAJOR DEPRESSIVE DISORDER, IN FULL REMISSION (HCC): ICD-10-CM

## 2022-04-18 RX ORDER — LEVOTHYROXINE SODIUM 0.03 MG/1
25 TABLET ORAL DAILY
Qty: 90 TABLET | Refills: 0 | Status: SHIPPED | OUTPATIENT
Start: 2022-04-18 | End: 2022-07-18 | Stop reason: SDUPTHER

## 2022-04-18 RX ORDER — CITALOPRAM 20 MG/1
TABLET ORAL
Qty: 135 TABLET | Refills: 1 | Status: SHIPPED | OUTPATIENT
Start: 2022-04-18

## 2022-05-27 ENCOUNTER — TELEPHONE (OUTPATIENT)
Dept: FAMILY MEDICINE CLINIC | Facility: CLINIC | Age: 72
End: 2022-05-27

## 2022-05-27 DIAGNOSIS — N39.0 URINARY TRACT INFECTION WITHOUT HEMATURIA, SITE UNSPECIFIED: Primary | ICD-10-CM

## 2022-05-27 RX ORDER — NITROFURANTOIN 25; 75 MG/1; MG/1
100 CAPSULE ORAL 2 TIMES DAILY
Qty: 20 CAPSULE | Refills: 0 | Status: SHIPPED | OUTPATIENT
Start: 2022-05-27 | End: 2022-06-06

## 2022-05-27 RX ORDER — PHENAZOPYRIDINE HYDROCHLORIDE 200 MG/1
200 TABLET, FILM COATED ORAL
Qty: 9 TABLET | Refills: 0 | Status: SHIPPED | OUTPATIENT
Start: 2022-05-27 | End: 2022-06-13

## 2022-05-27 NOTE — TELEPHONE ENCOUNTER
Patient advised  She is asking for meds to please be sent to pharmacy for UTI  She will go to urgent care

## 2022-05-27 NOTE — TELEPHONE ENCOUNTER
Patient called because she believes she has a UTI and would like meds sent to the pharmacy    She has pain/pressure, urgency without being able to go     [facility] [place] [facility] [place]    Also, patient fell last week while getting out of her recliner at home     She fell on her face and she was bruised and sore    Bruising has resolved, but she still has a big knot above her R eye and thinks she would like to come in for you to take a look at it    She said she did not go to get checked anywhere after her fall  She denies losing consciousness when she fell

## 2022-05-27 NOTE — TELEPHONE ENCOUNTER
Im out until next Wed  I think she should go to a walk in   Does she still want me to call in meds for her UTI, or let them take care of everything

## 2022-06-10 ENCOUNTER — TELEPHONE (OUTPATIENT)
Dept: FAMILY MEDICINE CLINIC | Facility: CLINIC | Age: 72
End: 2022-06-10

## 2022-06-10 DIAGNOSIS — E78.2 MIXED HYPERLIPIDEMIA: ICD-10-CM

## 2022-06-10 DIAGNOSIS — I10 ESSENTIAL HYPERTENSION: ICD-10-CM

## 2022-06-10 DIAGNOSIS — E03.9 HYPOTHYROIDISM, UNSPECIFIED TYPE: Primary | ICD-10-CM

## 2022-06-12 LAB
ALBUMIN SERPL-MCNC: 4.2 G/DL (ref 3.6–5.1)
ALBUMIN/GLOB SERPL: 1.6 (CALC) (ref 1–2.5)
ALP SERPL-CCNC: 58 U/L (ref 37–153)
ALT SERPL-CCNC: 56 U/L (ref 6–29)
AST SERPL-CCNC: 38 U/L (ref 10–35)
BILIRUB SERPL-MCNC: 0.4 MG/DL (ref 0.2–1.2)
BUN SERPL-MCNC: 16 MG/DL (ref 7–25)
BUN/CREAT SERPL: ABNORMAL (CALC) (ref 6–22)
CALCIUM SERPL-MCNC: 9.1 MG/DL (ref 8.6–10.4)
CHLORIDE SERPL-SCNC: 107 MMOL/L (ref 98–110)
CHOLEST SERPL-MCNC: 205 MG/DL
CHOLEST/HDLC SERPL: 5.4 (CALC)
CO2 SERPL-SCNC: 27 MMOL/L (ref 20–32)
CREAT SERPL-MCNC: 0.77 MG/DL (ref 0.6–0.93)
GLOBULIN SER CALC-MCNC: 2.7 G/DL (CALC) (ref 1.9–3.7)
GLUCOSE SERPL-MCNC: 126 MG/DL (ref 65–99)
HDLC SERPL-MCNC: 38 MG/DL
LDLC SERPL CALC-MCNC: 128 MG/DL (CALC)
NONHDLC SERPL-MCNC: 167 MG/DL (CALC)
POTASSIUM SERPL-SCNC: 4.3 MMOL/L (ref 3.5–5.3)
PROT SERPL-MCNC: 6.9 G/DL (ref 6.1–8.1)
SL AMB EGFR AFRICAN AMERICAN: 89 ML/MIN/1.73M2
SL AMB EGFR NON AFRICAN AMERICAN: 77 ML/MIN/1.73M2
SODIUM SERPL-SCNC: 141 MMOL/L (ref 135–146)
TRIGL SERPL-MCNC: 243 MG/DL
TSH SERPL-ACNC: 1.65 MIU/L (ref 0.4–4.5)

## 2022-06-13 ENCOUNTER — OFFICE VISIT (OUTPATIENT)
Dept: FAMILY MEDICINE CLINIC | Facility: CLINIC | Age: 72
End: 2022-06-13
Payer: COMMERCIAL

## 2022-06-13 VITALS
DIASTOLIC BLOOD PRESSURE: 84 MMHG | HEIGHT: 59 IN | TEMPERATURE: 99.1 F | WEIGHT: 177 LBS | OXYGEN SATURATION: 97 % | BODY MASS INDEX: 35.68 KG/M2 | SYSTOLIC BLOOD PRESSURE: 122 MMHG | HEART RATE: 67 BPM

## 2022-06-13 DIAGNOSIS — N32.81 OVERACTIVE BLADDER: ICD-10-CM

## 2022-06-13 DIAGNOSIS — R10.11 RUQ PAIN: ICD-10-CM

## 2022-06-13 DIAGNOSIS — E11.9 TYPE 2 DIABETES MELLITUS WITHOUT COMPLICATION, WITHOUT LONG-TERM CURRENT USE OF INSULIN (HCC): Primary | ICD-10-CM

## 2022-06-13 DIAGNOSIS — E03.8 OTHER SPECIFIED HYPOTHYROIDISM: ICD-10-CM

## 2022-06-13 DIAGNOSIS — E78.00 HYPERCHOLESTEROLEMIA: ICD-10-CM

## 2022-06-13 DIAGNOSIS — R42 VERTIGO: ICD-10-CM

## 2022-06-13 LAB — SL AMB POCT HEMOGLOBIN AIC: 5.9 (ref ?–6.5)

## 2022-06-13 PROCEDURE — 99214 OFFICE O/P EST MOD 30 MIN: CPT | Performed by: FAMILY MEDICINE

## 2022-06-13 PROCEDURE — 83036 HEMOGLOBIN GLYCOSYLATED A1C: CPT | Performed by: FAMILY MEDICINE

## 2022-06-13 RX ORDER — ROSUVASTATIN CALCIUM 10 MG/1
10 TABLET, COATED ORAL DAILY
Qty: 30 TABLET | Refills: 5 | Status: SHIPPED | OUTPATIENT
Start: 2022-06-13 | End: 2022-06-30 | Stop reason: SDUPTHER

## 2022-06-13 NOTE — PROGRESS NOTES
BMI Counseling: Body mass index is 35 75 kg/m²  The BMI is above normal  Nutrition recommendations include reducing portion sizes, decreasing overall calorie intake and moderation in carbohydrate intake  Depression Screening Follow-up Plan: Patient's depression screening was positive with a PHQ-2 score of   Their PHQ-9 score was   Patient advised to follow-up with PCP for further management  Patient ID: Jin Platt is a 66 y o  female  HPI: 66 y  o female presents for follow up of niddm,hypothyroidism  hypertension and hypercholesterolemia  Hgba1c is   5 9  and patient's issues are well controlled  Patient deneis any dyspnea, chest pain or palpitations  She has been getting RUQ pain after eating fatty foods  She also complains of vertigo chronically and would like to see ENT      SUBJECTIVE    Family History   Problem Relation Age of Onset    Depression Maternal Uncle     Diabetes Maternal Uncle      Social History     Socioeconomic History    Marital status: Single     Spouse name: Not on file    Number of children: Not on file    Years of education: completed 12th grade    Highest education level: Not on file   Occupational History    Not on file   Tobacco Use    Smoking status: Never Smoker    Smokeless tobacco: Never Used   Substance and Sexual Activity    Alcohol use: Yes     Comment: social    Drug use: Never    Sexual activity: Not Currently   Other Topics Concern    Not on file   Social History Narrative    Daily coffee consumption: ____cp/day    Daily cola consumption: ___ can/day    daily tea consumption: _____cp/day     Social Determinants of Health     Financial Resource Strain: Not on file   Food Insecurity: Not on file   Transportation Needs: Not on file   Physical Activity: Not on file   Stress: Not on file   Social Connections: Not on file   Intimate Partner Violence: Not on file   Housing Stability: Not on file     Past Medical History:   Diagnosis Date    Anxiety     Depression     Hyperlipidemia     Stroke [facility])      Past Surgical History:   Procedure Laterality Date    EYE SURGERY      cosmetic     Allergies   Allergen Reactions    Statins      myalgia       Current Outpatient Medications:     ALPRAZolam (XANAX) 0 25 mg tablet, Take 1 tablet (0 25 mg total) by mouth 3 (three) times a day as needed for anxiety, Disp: 90 tablet, Rfl: 0    cholecalciferol (VITAMIN D3) 1,000 units tablet, Take 1,000 Units by mouth daily, Disp: , Rfl:     citalopram (CeleXA) 20 mg tablet, Take one and one half of a tablet daily (30 mg total ), Disp: 135 tablet, Rfl: 1    Cyanocobalamin (Vitamin B 12) 500 MCG TABS, Take 1,000 mg by mouth daily, Disp: , Rfl:     levothyroxine 25 mcg tablet, Take 1 tablet (25 mcg total) by mouth daily, Disp: 90 tablet, Rfl: 0    rosuvastatin (CRESTOR) 10 MG tablet, Take 1 tablet (10 mg total) by mouth daily, Disp: 30 tablet, Rfl: 5    tolterodine (DETROL LA) 4 mg 24 hr capsule, Take 1 capsule (4 mg total) by mouth daily, Disp: 90 capsule, Rfl: 3    Review of Systems  Constitutional:     Denies fever, chills ,fatigue ,weakness ,weight loss, weight gain     ENT: Denies earache ,loss of hearing ,nosebleed, nasal discharge,nasal congestion ,sore throat ,hoarseness  Pulmonary: Denies shortness of breath ,cough  ,dyspnea on exertion, orthopnea  ,PND   Cardiovascular:  Denies bradycardia , tachycardia  ,palpations, lower extremity edema leg, claudication  Breast:  Denies new or changing breast lumps ,nipple discharge ,nipple changes  Abdomen:  +RUQ abdominal pain , no anorexia , indigestion, nausea,+ intermittent  vomiting, no constipation, no diarrhea  Musculoskeletal: Denies myalgias, arthralgias, joint swelling, joint stiffness , limb pain, limb swelling  Gu: denies dysuria, polyuria  Skin: Denies skin rash, skin lesion, skin wound, itching, dry skin  Neuro: Denies headache, numbness, tingling, confusion, loss of consciousness, dizziness, +vertigo  Psychiatric: Denies feelings of depression, suicidal ideation, anxiety, sleep disturbances    OBJECTIVE  /84   Pulse 67   Temp 99 1 °F (37 3 °C)   Ht 4' 11" (1 499 m)   Wt 80 3 kg (177 lb)   SpO2 97%   BMI 35 75 kg/m²   Constitutional:   NAD, well appearing and well nourished      ENT:   Conjunctiva and lids: no injection, edema, or discharge     Pupils and iris: HENRY bilaterally    External inspection of ears and nose: normal without deformities or discharge  Otoscopic exam: Canals patent without erythema  Nasal mucosa, septum and turbinates: Normal or edema or discharge         Oropharynx:  Moist mucosa, normal tongue and tonsils without lesions  No erythema        Pulmonary:Respiratory effort normal rate and rhythm, no increased work of breathing  Auscultation of lungs:  Clear bilaterally with no adventitious breath sounds       Cardiovascular: regular rate and rhythm, S1 and S2, no murmur, no edema and/or varicosities of LE      Abdomen: Soft and non-distended, + RUQ pain on palpation      Positive bowel sounds      No heptomegaly or splenomegaly      Gu: no suprapubic tenderness or CVA tenderness, no urethral discharge  Lymphatic:  No anterior or posterior cervical lymphadenopathy         Musculoskeletal:  Gait and station: Normal gait      Digits and nails normal without clubbing or cyanosis       Inspection/palpation of joints, bones, and muscles:  No joint tenderness, swelling, full active and passive range of motion       Skin: Normal skin turgor and no rashes      Neuro:    Normal reflexes     Psych:   alert and oriented to person, place and time     normal mood and affect   Patient's shoes and socks removed  Right Foot/Ankle   Right Foot Inspection  Skin Exam: skin normal and skin intact  No dry skin, no warmth, no callus, no erythema, no maceration, no abnormal color, no pre-ulcer, no ulcer and no callus  Toe Exam: ROM and strength within normal limits   No swelling, no tenderness, erythema and  no right toe deformity    Sensory   Vibration: diminished  Proprioception: diminished  Monofilament testing: diminished    Vascular  Capillary refills: < 3 seconds  The right DP pulse is 2+  The right PT pulse is 2+  Left Foot/Ankle  Left Foot Inspection  Skin Exam: skin normal and skin intact  No dry skin, no warmth, no erythema, no maceration, normal color, no pre-ulcer, no ulcer and no callus  Toe Exam: ROM and strength within normal limits  No swelling, no tenderness, no erythema and no left toe deformity  Sensory   Vibration: diminished  Proprioception: diminished  Monofilament testing: diminished    Vascular  Capillary refills: < 3 seconds  The left DP pulse is 2+  The left PT pulse is 2+  Assign Risk Category  No deformity present  Loss of protective sensation        Assessment/Plan:Diagnoses and all orders for this visit:    Type 2 diabetes mellitus without complication, without long-term current use of insulin ([facility])  Comments:  Continue with diet control   Orders:  -     POCT hemoglobin A1c    Hypercholesterolemia  Comments: Will give a trial of crestor 10mg   Orders:  -     rosuvastatin (CRESTOR) 10 MG tablet; Take 1 tablet (10 mg total) by mouth daily    RUQ pain  -     US right upper quadrant; Future    Other specified hypothyroidism  Comments:  Continue levothyroxine therapy   Overactive bladder  Comments:  Continue vesicare therapy  Vertigo  -     Ambulatory Referral to Otolaryngology; Future        Reviewed with patient plan to treat with above plan      Patient instructed to call in 72 hours if not feeling better or if symptoms worsen

## 2022-06-13 NOTE — PATIENT INSTRUCTIONS
Foot Care for People with Diabetes   AMBULATORY CARE:   What you need to know about foot care:   · Foot care helps protect your feet and prevent foot ulcers or sores  Long-term high blood sugar levels can damage the blood vessels and nerves in your legs and feet  This damage makes it hard to feel pressure, pain, temperature, and touch  You may not be able to feel a cut or sore, or shoes that are too tight  Foot care is needed to prevent serious problems, such as an infection or amputation  · Diabetes may cause your toes to become crooked or curved under  These changes may affect the way you walk and can lead to increased pressure on your foot  The pressure can decrease blood flow to your feet  Lack of blood flow increases your risk for a foot ulcer  Do not ignore small problems, such as dry skin or small wounds  These can become life-threatening over time without proper care  Call your care team provider if:   · Your feet become numb, weak, or hard to move  · You have pus draining from a sore on your foot  · You have a wound on your foot that gets bigger, deeper, or does not heal      · You see blisters, cuts, scratches, calluses, or sores on your foot  · You have a fever, and your feet become red, warm, and swollen  · Your toenails become thick, curled, or yellow  · You find it hard to check your feet because your vision is poor  · You have questions or concerns about your condition or care  How to care for your feet:   · Check your feet each day  Look at your whole foot, including the bottom, and between and under your toes  Check for wounds, corns, and calluses  Use a mirror to see the bottom of your feet  The skin on your feet may be shiny, tight, or darker than normal  Your feet may also be cold and pale  Feel your feet by running your hands along the tops, bottoms, sides, and between your toes   Redness, swelling, and warmth are signs of blood flow problems that can lead to a foot ulcer  Do not try to remove corns or calluses yourself  · Wash your feet each day with soap and warm water  Do not use hot water, because this can injure your foot  Dry your feet gently with a towel after you wash them  Dry between and under your toes  · Apply lotion or a moisturizer on your dry feet  Ask your care team provider what lotions are best to use  Do not put lotion or moisturizer between your toes  Moisture between your toes could lead to skin breakdown  · Cut your toenails correctly  File or cut your toenails straight across  Use a soft brush to clean around your toenails  If your toenails are very thick, you may need to have a care team provider or specialist cut them  · Protect your feet  Do not walk barefoot or wear your shoes without socks  Check your shoes for rocks or other objects that can hurt your feet  Wear cotton socks to help keep your feet dry  Wear socks without toe seams, or wear them with the seams inside out  Change your socks each day  Do not wear socks that are dirty or damp  · Wear shoes that fit well  Wear shoes that do not rub against any area of your feet  Your shoes should be ½ to ¾ inch (1 to 2 centimeters) longer than your feet  Your shoes should also have extra space around the widest part of your feet  Walking or athletic shoes with laces or straps that adjust are best  Ask your care team provider for help to choose shoes that fit you best  Ask him or her if you need to wear an insert, orthotic, or bandage on your feet  · Go to your follow-up visits  Your care team provider will do a foot exam at least once a year  You may need a foot exam more often if you have nerve damage, foot deformities, or ulcers  He will check for nerve damage and how well you can feel your feet  He will check your shoes to see if they fit well  · Do not smoke  Smoking can damage your blood vessels and put you at increased risk for foot ulcers   Ask your care team provider for information if you currently smoke and need help to quit  E-cigarettes or smokeless tobacco still contain nicotine  Talk to your care team provider before you use these products  Follow up with your diabetes care team provider or foot specialist as directed: You will need to have your feet checked at least once a year  You may need a foot exam more often if you have nerve damage, foot deformities, or ulcers  Write down your questions so you remember to ask them during your visits  © Copyright [address] 2022 Information is for End User's use only and may not be sold, redistributed or otherwise used for commercial purposes  All illustrations and images included in CareNotes® are the copyrighted property of A D [employer] M , Inc  or [facility] [employer]   The above information is an  only  It is not intended as medical advice for individual conditions or treatments  Talk to your doctor, nurse or pharmacist before following any medical regimen to see if it is safe and effective for you

## 2022-06-28 DIAGNOSIS — N32.81 OVERACTIVE BLADDER: ICD-10-CM

## 2022-06-28 RX ORDER — TOLTERODINE 4 MG/1
4 CAPSULE, EXTENDED RELEASE ORAL DAILY
Qty: 90 CAPSULE | Refills: 3 | Status: SHIPPED | OUTPATIENT
Start: 2022-06-28

## 2022-06-30 ENCOUNTER — TELEPHONE (OUTPATIENT)
Dept: FAMILY MEDICINE CLINIC | Facility: CLINIC | Age: 72
End: 2022-06-30

## 2022-06-30 DIAGNOSIS — E78.00 HYPERCHOLESTEROLEMIA: ICD-10-CM

## 2022-06-30 RX ORDER — ROSUVASTATIN CALCIUM 10 MG/1
10 TABLET, COATED ORAL DAILY
Qty: 90 TABLET | Refills: 1 | Status: SHIPPED | OUTPATIENT
Start: 2022-06-30

## 2022-07-18 DIAGNOSIS — E03.9 HYPOTHYROIDISM, UNSPECIFIED TYPE: ICD-10-CM

## 2022-07-18 RX ORDER — LEVOTHYROXINE SODIUM 0.03 MG/1
25 TABLET ORAL DAILY
Qty: 90 TABLET | Refills: 0 | Status: SHIPPED | OUTPATIENT
Start: 2022-07-18 | End: 2022-10-17

## 2022-10-14 DIAGNOSIS — F33.42 RECURRENT MAJOR DEPRESSIVE DISORDER, IN FULL REMISSION (HCC): ICD-10-CM

## 2022-10-14 DIAGNOSIS — E03.9 HYPOTHYROIDISM, UNSPECIFIED TYPE: ICD-10-CM

## 2022-10-17 ENCOUNTER — APPOINTMENT (OUTPATIENT)
Dept: LAB | Facility: CLINIC | Age: 72
End: 2022-10-17
Payer: COMMERCIAL

## 2022-10-17 DIAGNOSIS — I10 ESSENTIAL HYPERTENSION: ICD-10-CM

## 2022-10-17 DIAGNOSIS — F33.42 RECURRENT MAJOR DEPRESSIVE DISORDER, IN FULL REMISSION (HCC): ICD-10-CM

## 2022-10-17 DIAGNOSIS — E03.9 HYPOTHYROIDISM, UNSPECIFIED TYPE: ICD-10-CM

## 2022-10-17 DIAGNOSIS — E78.2 MIXED HYPERLIPIDEMIA: ICD-10-CM

## 2022-10-17 LAB
ALBUMIN SERPL BCP-MCNC: 3.6 G/DL (ref 3.5–5)
ALP SERPL-CCNC: 61 U/L (ref 46–116)
ALT SERPL W P-5'-P-CCNC: 43 U/L (ref 12–78)
ANION GAP SERPL CALCULATED.3IONS-SCNC: 6 MMOL/L (ref 4–13)
AST SERPL W P-5'-P-CCNC: 25 U/L (ref 5–45)
BILIRUB SERPL-MCNC: 0.64 MG/DL (ref 0.2–1)
BUN SERPL-MCNC: 12 MG/DL (ref 5–25)
CALCIUM SERPL-MCNC: 9.1 MG/DL (ref 8.3–10.1)
CHLORIDE SERPL-SCNC: 110 MMOL/L (ref 96–108)
CHOLEST SERPL-MCNC: 107 MG/DL
CO2 SERPL-SCNC: 25 MMOL/L (ref 21–32)
CREAT SERPL-MCNC: 0.96 MG/DL (ref 0.6–1.3)
GFR SERPL CREATININE-BSD FRML MDRD: 58 ML/MIN/1.73SQ M
GLUCOSE P FAST SERPL-MCNC: 167 MG/DL (ref 65–99)
HDLC SERPL-MCNC: 32 MG/DL
LDLC SERPL CALC-MCNC: 41 MG/DL (ref 0–100)
POTASSIUM SERPL-SCNC: 4.1 MMOL/L (ref 3.5–5.3)
PROT SERPL-MCNC: 7.1 G/DL (ref 6.4–8.4)
SODIUM SERPL-SCNC: 141 MMOL/L (ref 135–147)
TRIGL SERPL-MCNC: 168 MG/DL
TSH SERPL DL<=0.05 MIU/L-ACNC: 2.6 UIU/ML (ref 0.45–4.5)

## 2022-10-17 PROCEDURE — 36415 COLL VENOUS BLD VENIPUNCTURE: CPT

## 2022-10-17 PROCEDURE — 80053 COMPREHEN METABOLIC PANEL: CPT

## 2022-10-17 PROCEDURE — 84443 ASSAY THYROID STIM HORMONE: CPT

## 2022-10-17 PROCEDURE — 80061 LIPID PANEL: CPT

## 2022-10-17 RX ORDER — CITALOPRAM 20 MG/1
TABLET ORAL
Qty: 135 TABLET | Refills: 0 | Status: SHIPPED | OUTPATIENT
Start: 2022-10-17 | End: 2022-10-17 | Stop reason: SDUPTHER

## 2022-10-17 RX ORDER — CITALOPRAM 20 MG/1
TABLET ORAL
Qty: 135 TABLET | Refills: 3 | Status: SHIPPED | OUTPATIENT
Start: 2022-10-17

## 2022-10-17 RX ORDER — LEVOTHYROXINE SODIUM 0.03 MG/1
25 TABLET ORAL DAILY
Qty: 90 TABLET | Refills: 0 | Status: SHIPPED | OUTPATIENT
Start: 2022-10-17 | End: 2022-10-19 | Stop reason: SDUPTHER

## 2022-10-17 RX ORDER — LEVOTHYROXINE SODIUM 0.03 MG/1
TABLET ORAL
Qty: 90 TABLET | Refills: 0 | Status: SHIPPED | OUTPATIENT
Start: 2022-10-17 | End: 2022-10-17 | Stop reason: SDUPTHER

## 2022-10-19 ENCOUNTER — OFFICE VISIT (OUTPATIENT)
Dept: FAMILY MEDICINE CLINIC | Facility: CLINIC | Age: 72
End: 2022-10-19
Payer: COMMERCIAL

## 2022-10-19 VITALS
HEIGHT: 59 IN | HEART RATE: 86 BPM | DIASTOLIC BLOOD PRESSURE: 88 MMHG | OXYGEN SATURATION: 96 % | BODY MASS INDEX: 36.04 KG/M2 | SYSTOLIC BLOOD PRESSURE: 136 MMHG | WEIGHT: 178.8 LBS | TEMPERATURE: 97.6 F

## 2022-10-19 DIAGNOSIS — F33.42 RECURRENT MAJOR DEPRESSIVE DISORDER, IN FULL REMISSION (HCC): ICD-10-CM

## 2022-10-19 DIAGNOSIS — E11.9 TYPE 2 DIABETES MELLITUS WITHOUT COMPLICATION, WITHOUT LONG-TERM CURRENT USE OF INSULIN (HCC): Primary | ICD-10-CM

## 2022-10-19 DIAGNOSIS — Z23 NEED FOR VACCINATION: ICD-10-CM

## 2022-10-19 DIAGNOSIS — E03.9 HYPOTHYROIDISM, UNSPECIFIED TYPE: ICD-10-CM

## 2022-10-19 DIAGNOSIS — E66.01 OBESITY, MORBID (HCC): ICD-10-CM

## 2022-10-19 DIAGNOSIS — E78.00 HYPERCHOLESTEROLEMIA: ICD-10-CM

## 2022-10-19 DIAGNOSIS — N18.31 STAGE 3A CHRONIC KIDNEY DISEASE (HCC): ICD-10-CM

## 2022-10-19 LAB — SL AMB POCT HEMOGLOBIN AIC: 5.9 (ref ?–6.5)

## 2022-10-19 PROCEDURE — 99214 OFFICE O/P EST MOD 30 MIN: CPT | Performed by: FAMILY MEDICINE

## 2022-10-19 PROCEDURE — 83036 HEMOGLOBIN GLYCOSYLATED A1C: CPT | Performed by: FAMILY MEDICINE

## 2022-10-19 PROCEDURE — 90662 IIV NO PRSV INCREASED AG IM: CPT | Performed by: FAMILY MEDICINE

## 2022-10-19 PROCEDURE — G0008 ADMIN INFLUENZA VIRUS VAC: HCPCS | Performed by: FAMILY MEDICINE

## 2022-10-19 RX ORDER — LEVOTHYROXINE SODIUM 0.03 MG/1
25 TABLET ORAL DAILY
Qty: 90 TABLET | Refills: 3
Start: 2022-10-19

## 2022-10-19 RX ORDER — ROSUVASTATIN CALCIUM 10 MG/1
10 TABLET, COATED ORAL DAILY
Qty: 90 TABLET | Refills: 3
Start: 2022-10-19

## 2022-10-21 NOTE — PATIENT INSTRUCTIONS

## 2022-10-21 NOTE — PROGRESS NOTES
Patient ID: Giuseppe Harrell is a 74 y o  female  HPI: 74 y  o female presents for follow up of niddm, hypothyroidism, depression and hypercholesterolemia  Hgba1c is  Good at 5 9 controlled with just diet and her depression, hypertension and hypothyroidism are all well controlled  Patient deneis any dyspnea, chest pain or palpitations        SUBJECTIVE    Family History   Problem Relation Age of Onset   • Depression Maternal Uncle    • Diabetes Maternal Uncle      Social History     Socioeconomic History   • Marital status: Single     Spouse name: Not on file   • Number of children: Not on file   • Years of education: completed 12th grade   • Highest education level: Not on file   Occupational History   • Not on file   Tobacco Use   • Smoking status: Never Smoker   • Smokeless tobacco: Never Used   Substance and Sexual Activity   • Alcohol use: Yes     Comment: social   • Drug use: Never   • Sexual activity: Not Currently   Other Topics Concern   • Not on file   Social History Narrative    Daily coffee consumption: ____cp/day    Daily cola consumption: ___ can/day    daily tea consumption: _____cp/day     Social Determinants of Health     Financial Resource Strain: Not on file   Food Insecurity: Not on file   Transportation Needs: Not on file   Physical Activity: Not on file   Stress: Not on file   Social Connections: Not on file   Intimate Partner Violence: Not on file   Housing Stability: Not on file     Past Medical History:   Diagnosis Date   • Anxiety    • Depression    • Hyperlipidemia    • Stroke [facility])      Past Surgical History:   Procedure Laterality Date   • EYE SURGERY      cosmetic     Allergies   Allergen Reactions   • Statins      myalgia       Current Outpatient Medications:   •  ALPRAZolam (XANAX) 0 25 mg tablet, Take 1 tablet (0 25 mg total) by mouth 3 (three) times a day as needed for anxiety, Disp: 90 tablet, Rfl: 0  •  cholecalciferol (VITAMIN D3) 1,000 units tablet, Take 1,000 Units by mouth daily, Disp: , Rfl:   •  citalopram (CeleXA) 20 mg tablet, TAKE 1 & 1/2 (ONE & ONE-HALF) TABLETS BY MOUTH ONCE DAILY, Disp: 135 tablet, Rfl: 3  •  Cyanocobalamin (Vitamin B 12) 500 MCG TABS, Take 1,000 mg by mouth daily, Disp: , Rfl:   •  levothyroxine 25 mcg tablet, Take 1 tablet (25 mcg total) by mouth daily, Disp: 90 tablet, Rfl: 3  •  rosuvastatin (CRESTOR) 10 MG tablet, Take 1 tablet (10 mg total) by mouth daily, Disp: 90 tablet, Rfl: 3  •  tolterodine (DETROL LA) 4 mg 24 hr capsule, Take 1 capsule (4 mg total) by mouth daily, Disp: 90 capsule, Rfl: 3    Review of Systems  Constitutional:     Denies fever, chills ,fatigue ,weakness ,weight loss, weight gain     ENT: Denies earache ,loss of hearing ,nosebleed, nasal discharge,nasal congestion ,sore throat ,hoarseness  Pulmonary: Denies shortness of breath ,cough  ,dyspnea on exertion, orthopnea  ,PND   Cardiovascular:  Denies bradycardia , tachycardia  ,palpations, lower extremity edema leg, claudication  Breast:  Denies new or changing breast lumps ,nipple discharge ,nipple changes  Abdomen:  Denies abdominal pain , anorexia , indigestion, nausea, vomiting, constipation, diarrhea  Musculoskeletal: Denies myalgias, arthralgias, joint swelling, joint stiffness , limb pain, limb swelling  Gu: denies dysuria, polyuria  Skin: Denies skin rash, skin lesion, skin wound, itching, dry skin  Neuro: Denies headache, numbness, tingling, confusion, loss of consciousness, dizziness, vertigo  Psychiatric: Denies feelings of depression, suicidal ideation, anxiety, sleep disturbances    OBJECTIVE  /88 (BP Location: Right arm, Patient Position: Sitting, Cuff Size: Large)   Pulse 86   Temp 97 6 °F (36 4 °C)   Ht 4' 11" (1 499 m)   Wt 81 1 kg (178 lb 12 8 oz)   SpO2 96%   BMI 36 11 kg/m²   Constitutional:   NAD, well appearing and well nourished      ENT:   Conjunctiva and lids: no injection, edema, or discharge     Pupils and iris: HENRY bilaterally    External inspection of ears and nose: normal without deformities or discharge  Otoscopic exam: Canals patent without erythema  Nasal mucosa, septum and turbinates: Normal or edema or discharge         Oropharynx:  Moist mucosa, normal tongue and tonsils without lesions  No erythema        Pulmonary:Respiratory effort normal rate and rhythm, no increased work of breathing  Auscultation of lungs:  Clear bilaterally with no adventitious breath sounds       Cardiovascular: regular rate and rhythm, S1 and S2, no murmur, no edema and/or varicosities of LE      Abdomen: Soft and non-distended     Positive bowel sounds      No heptomegaly or splenomegaly      Gu: no suprapubic tenderness or CVA tenderness, no urethral discharge  Lymphatic:  No anterior or posterior cervical lymphadenopathy         Musculoskeletal:  Gait and station: Normal gait      Digits and nails normal without clubbing or cyanosis       Inspection/palpation of joints, bones, and muscles:  No joint tenderness, swelling, full active and passive range of motion       Skin: Normal skin turgor and no rashes      Neuro:    Normal reflexes     Psych:   alert and oriented to person, place and time     normal mood and affect   Patient's shoes and socks removed  Right Foot/Ankle   Right Foot Inspection  Skin Exam: skin normal and skin intact  No dry skin, no warmth, no callus, no erythema, no maceration, no abnormal color, no pre-ulcer, no ulcer and no callus  Toe Exam: ROM and strength within normal limits  No swelling, no tenderness and erythema    Sensory   Vibration: intact  Proprioception: intact  Monofilament testing: intact    Vascular  Capillary refills: < 3 seconds  The right DP pulse is 2+  The right PT pulse is 2+  Left Foot/Ankle  Left Foot Inspection  Skin Exam: skin normal and skin intact  No dry skin, no warmth, no erythema, no maceration, normal color, no pre-ulcer, no ulcer and no callus       Toe Exam: ROM and strength within normal limits  No swelling, no tenderness, no erythema and no left toe deformity  Sensory   Vibration: intact  Proprioception: intact  Monofilament testing: intact    Vascular  Capillary refills: < 3 seconds  The left DP pulse is 2+  The left PT pulse is 2+  Assign Risk Category  No deformity present  No loss of protective sensation  No weak pulses  Risk: 0      Assessment/Plan:Diagnoses and all orders for this visit:    Type 2 diabetes mellitus without complication, without long-term current use of insulin ([facility])  Comments:  Stable with diet control only  Orders:  -     POCT hemoglobin A1c    Hypothyroidism, unspecified type  Comments:  Continue levothyroxine therapy   Orders:  -     levothyroxine 25 mcg tablet; Take 1 tablet (25 mcg total) by mouth daily    Hypercholesterolemia  Comments: Will give a trial of crestor 10mg   Orders:  -     rosuvastatin (CRESTOR) 10 MG tablet; Take 1 tablet (10 mg total) by mouth daily    Recurrent major depressive disorder, in full remission ([facility] )  Comments:  Stable on celexa tx  Stage 3a chronic kidney disease (HCC)  Comments:  Continue monitoring renal function    Obesity, morbid ([facility] )  Comments:  Pt limiited physically, but will try to cut back on portion size and snacking  Need for vaccination  -     influenza vaccine, high-dose, PF 0 7 mL (FLUZONE HIGH-DOSE)    I will see patient back in 4 mos or sooner prn

## 2022-12-08 ENCOUNTER — OFFICE VISIT (OUTPATIENT)
Dept: FAMILY MEDICINE CLINIC | Facility: CLINIC | Age: 72
End: 2022-12-08

## 2022-12-08 VITALS
HEIGHT: 59 IN | HEART RATE: 71 BPM | SYSTOLIC BLOOD PRESSURE: 102 MMHG | TEMPERATURE: 98 F | OXYGEN SATURATION: 96 % | WEIGHT: 182 LBS | BODY MASS INDEX: 36.69 KG/M2 | DIASTOLIC BLOOD PRESSURE: 72 MMHG

## 2022-12-08 DIAGNOSIS — E78.00 HYPERCHOLESTEROLEMIA: ICD-10-CM

## 2022-12-08 DIAGNOSIS — E03.9 HYPOTHYROIDISM, UNSPECIFIED TYPE: ICD-10-CM

## 2022-12-08 DIAGNOSIS — Z78.0 ASYMPTOMATIC POSTMENOPAUSAL ESTROGEN DEFICIENCY: ICD-10-CM

## 2022-12-08 DIAGNOSIS — Z12.31 BREAST CANCER SCREENING BY MAMMOGRAM: ICD-10-CM

## 2022-12-08 DIAGNOSIS — E11.9 TYPE 2 DIABETES MELLITUS WITHOUT COMPLICATION, WITHOUT LONG-TERM CURRENT USE OF INSULIN (HCC): ICD-10-CM

## 2022-12-08 DIAGNOSIS — I10 ESSENTIAL HYPERTENSION: ICD-10-CM

## 2022-12-08 DIAGNOSIS — Z00.00 MEDICARE ANNUAL WELLNESS VISIT, SUBSEQUENT: Primary | ICD-10-CM

## 2022-12-08 RX ORDER — LORATADINE 10 MG/1
10 TABLET ORAL DAILY
COMMUNITY

## 2022-12-08 RX ORDER — FAMOTIDINE 20 MG/1
20 TABLET, FILM COATED ORAL 2 TIMES DAILY
COMMUNITY

## 2022-12-08 NOTE — PROGRESS NOTES
Assessment and Plan:     Problem List Items Addressed This Visit        Endocrine    Type 2 diabetes mellitus without complication, without long-term current use of insulin ([facility] [address] ) - Primary    Relevant Orders    Microalbumin / creatinine urine ratio        Preventive health issues were discussed with patient, and age appropriate screening tests were ordered as noted in patient's After Visit Summary  Personalized health advice and appropriate referrals for health education or preventive services given if needed, as noted in patient's After Visit Summary  History of Present Illness:     Patient presents for a Medicare Wellness Visit  She is also here for f/u of niddm, hypertension, hypercholesterolemia, hypothyrodisim  HPI   Patient Care Team:  Demarcus Dave DO as PCP - General  Demarcus Dave DO as PCP - [address] [place], [place] [id number] [facility] [facility]  David Quinn MD     Review of Systems:     Review of Systems   Constitutional: Negative for appetite change, chills and fever  HENT: Negative for ear pain, facial swelling, rhinorrhea, sinus pain, sore throat and trouble swallowing  Eyes: Negative for discharge and redness  Respiratory: Negative for chest tightness, shortness of breath and wheezing  Cardiovascular: Negative for chest pain and palpitations  Gastrointestinal: Negative for abdominal pain, diarrhea, nausea and vomiting  Endocrine: Negative for polyuria  Genitourinary: Negative for dysuria and urgency  Musculoskeletal: Negative for arthralgias and back pain  Skin: Negative for rash  Neurological: Negative for dizziness, weakness and headaches  Hematological: Negative for adenopathy  Psychiatric/Behavioral: Negative for behavioral problems, confusion and sleep disturbance  All other systems reviewed and are negative         Problem List:     Patient Active Problem List   Diagnosis   • Type 2 diabetes mellitus without complication, without long-term current use of insulin (HCC)   • Hyperlipidemia   • Depression with anxiety   • Recurrent major depressive disorder, in full remission ([facility] [address] )   • Obesity, morbid ([facility] )   • Overactive bladder   • Other specified hypothyroidism   • Stage 3a chronic kidney disease ([facility] )      Past Medical and Surgical History:     Past Medical History:   Diagnosis Date   • Anxiety    • Depression    • Hyperlipidemia    • Stroke [facility])      Past Surgical History:   Procedure Laterality Date   • EYE SURGERY      cosmetic      Family History:     Family History   Problem Relation Age of Onset   • Depression Maternal Uncle    • Diabetes Maternal Uncle       Social History:     Social History     Socioeconomic History   • Marital status: Single     Spouse name: None   • Number of children: None   • Years of education: completed 12th grade   • Highest education level: None   Occupational History   • None   Tobacco Use   • Smoking status: Never   • Smokeless tobacco: Never   Substance and Sexual Activity   • Alcohol use: Yes     Comment: social   • Drug use: Never   • Sexual activity: Not Currently   Other Topics Concern   • None   Social History Narrative    Daily coffee consumption: ____cp/day    Daily cola consumption: ___ can/day    daily tea consumption: _____cp/day     Social Determinants of Health     Financial Resource Strain: Low Risk    • Difficulty of Paying Living Expenses: Not very hard   Food Insecurity: Not on file   Transportation Needs: No Transportation Needs   • Lack of Transportation (Medical): No   • Lack of Transportation (Non-Medical):  No   Physical Activity: Not on file   Stress: Not on file   Social Connections: Not on file   Intimate Partner Violence: Not on file   Housing Stability: Not on file      Medications and Allergies:     Current Outpatient Medications   Medication Sig Dispense Refill   • ALPRAZolam (XANAX) 0 25 mg tablet Take 1 tablet (0 25 mg total) by mouth 3 (three) times a day as needed for anxiety 90 tablet 0   • cholecalciferol (VITAMIN D3) 1,000 units tablet Take 1,000 Units by mouth daily     • citalopram (CeleXA) 20 mg tablet TAKE 1 & 1/2 (ONE & ONE-HALF) TABLETS BY MOUTH ONCE DAILY 135 tablet 3   • Cyanocobalamin (Vitamin B 12) 500 MCG TABS Take 1,000 mg by mouth daily     • famotidine (PEPCID) 20 mg tablet Take 20 mg by mouth 2 (two) times a day     • levothyroxine 25 mcg tablet Take 1 tablet (25 mcg total) by mouth daily 90 tablet 3   • loratadine (CLARITIN) 10 mg tablet Take 10 mg by mouth daily     • rosuvastatin (CRESTOR) 10 MG tablet Take 1 tablet (10 mg total) by mouth daily 90 tablet 3   • tolterodine (DETROL LA) 4 mg 24 hr capsule Take 1 capsule (4 mg total) by mouth daily 90 capsule 3     No current facility-administered medications for this visit  Allergies   Allergen Reactions   • Statins      myalgia      Immunizations:     Immunization History   Administered Date(s) Administered   • COVID-19 PFIZER VACCINE 0 3 ML IM 03/07/2021, 03/29/2021, 10/14/2021   • H1N1, All Formulations 02/05/2010   • INFLUENZA 10/27/2015, 09/21/2016, 10/23/2017, 11/10/2017   • Influenza Split High Dose Preservative Free IM 10/27/2015, 09/21/2016, 11/10/2017   • Influenza, high dose seasonal 0 7 mL 11/19/2018, 10/16/2019, 10/09/2020, 12/07/2021, 10/19/2022   • Influenza, seasonal, injectable 01/24/2013, 10/22/2013   • Pneumococcal Conjugate 13-Valent 09/21/2016   • Pneumococcal Polysaccharide PPV23 05/12/2016      Health Maintenance:         Topic Date Due   • DXA SCAN  06/06/2007   • Breast Cancer Screening: Mammogram  06/02/2012   • Colorectal Cancer Screening  09/16/2022   • Hepatitis C Screening  Completed         Topic Date Due   • Hepatitis B Vaccine (1 of 3 - 3-dose series) Never done   • COVID-19 Vaccine (4 - Booster for Pfizer series) 02/14/2022      Medicare Screening Tests and Risk Assessments:     Ples Dubin is here for her Subsequent Wellness visit   Last Medicare Wellness visit information reviewed, patient interviewed, no change since last AWV  Health Risk Assessment:   Patient rates overall health as fair  Patient feels that their physical health rating is same  Patient is satisfied with their life  Eyesight was rated as same  Hearing was rated as same  Patient feels that their emotional and mental health rating is same  Patients states they are never, rarely angry  Patient states they are often unusually tired/fatigued  Pain experienced in the last 7 days has been none  Patient states that she has experienced no weight loss or gain in last 6 months  Depression Screening:   PHQ-9 Score: 9      Fall Risk Screening: In the past year, patient has experienced: no history of falling in past year      Urinary Incontinence Screening:   Patient has leaked urine accidently in the last six months  Home Safety:  Patient has trouble with stairs inside or outside of their home  Patient has working smoke alarms and has working carbon monoxide detector  Home safety hazards include: none  Nutrition:   Current diet is Regular and Limited junk food  Medications:   Patient is currently taking over-the-counter supplements  OTC medications include: D3 B12 Biotin  Patient is able to manage medications  Activities of Daily Living (ADLs)/Instrumental Activities of Daily Living (IADLs):   Walk and transfer into and out of bed and chair?: Yes  Dress and groom yourself?: Yes    Bathe or shower yourself?: Yes    Feed yourself? Yes  Do your laundry/housekeeping?: Yes  Manage your money, pay your bills and track your expenses?: Yes  Make your own meals?: Yes    Do your own shopping?: Yes    Durable Medical Equipment Suppliers  N/A    Previous Hospitalizations:   Any hospitalizations or ED visits within the last 12 months?: No      Advance Care Planning:   Living will: No    Durable POA for healthcare:  Yes    Advanced directive: No    Advanced directive counseling given: Yes    Five wishes given: Yes    Patient declined ACP directive: No    End of Life Decisions reviewed with patient: Yes    Provider agrees with end of life decisions: Yes      Cognitive Screening:   Provider or family/friend/caregiver concerned regarding cognition?: No    PREVENTIVE SCREENINGS      Cardiovascular Screening:    General: Screening Not Indicated and History Lipid Disorder      Diabetes Screening:     General: Screening Not Indicated and History Diabetes      Colorectal Cancer Screening:     General: Screening Current      Breast Cancer Screening:     General: Risks and Benefits Discussed    Due for: Mammogram        Cervical Cancer Screening:    General: Screening Not Indicated      Osteoporosis Screening:    General: Risks and Benefits Discussed    Due for: DXA Appendicular      Abdominal Aortic Aneurysm (AAA) Screening:        General: Screening Current and Screening Not Indicated      Lung Cancer Screening:     General: Screening Not Indicated      Hepatitis C Screening:    General: Screening Current    Screening, Brief Intervention, and Referral to Treatment (SBIRT)    Screening  Typical number of drinks in a day: 0  Typical number of drinks in a week: 0  Interpretation: Low risk drinking behavior  AUDIT-C Screenin) How often did you have a drink containing alcohol in the past year? never  2) How many drinks did you have on a typical day when you were drinking in the past year? 0  3) How often did you have 6 or more drinks on one occasion in the past year? never    AUDIT-C Score: 0  Interpretation: Score 0-2 (female): Negative screen for alcohol misuse    Single Item Drug Screening:  How often have you used an illegal drug (including marijuana) or a prescription medication for non-medical reasons in the past year? never    Single Item Drug Screen Score: 0  Interpretation: Negative screen for possible drug use disorder    No results found       Physical Exam:     /72   Pulse 71   Temp 98 °F (36 7 °C)   Ht 4' 11" (1 499 m)   Wt 82 6 kg (182 lb) SpO2 96%   BMI 36 76 kg/m²     Physical Exam  Vitals and nursing note reviewed  Constitutional:       General: She is not in acute distress  Appearance: Normal appearance  She is not ill-appearing or diaphoretic  HENT:      Head: Normocephalic and atraumatic  Right Ear: Tympanic membrane, ear canal and external ear normal       Left Ear: Tympanic membrane, ear canal and external ear normal       Nose: Nose normal  No congestion or rhinorrhea  Mouth/Throat:      Mouth: Mucous membranes are moist       Pharynx: Oropharynx is clear  No posterior oropharyngeal erythema  Eyes:      General:         Right eye: No discharge  Left eye: No discharge  Extraocular Movements: Extraocular movements intact  Conjunctiva/sclera: Conjunctivae normal       Pupils: Pupils are equal, round, and reactive to light  Neck:      Vascular: No carotid bruit  Cardiovascular:      Rate and Rhythm: Normal rate and regular rhythm  Pulses: Normal pulses  Heart sounds: Normal heart sounds  No murmur heard  Pulmonary:      Effort: Pulmonary effort is normal  No respiratory distress  Breath sounds: Normal breath sounds  No wheezing or rhonchi  Abdominal:      General: Abdomen is flat  Bowel sounds are normal  There is no distension  Palpations: There is no mass  Tenderness: There is no abdominal tenderness  Musculoskeletal:         General: No swelling or deformity  Normal range of motion  Cervical back: Normal range of motion and neck supple  No rigidity  Right lower leg: No edema  Left lower leg: No edema  Lymphadenopathy:      Cervical: No cervical adenopathy  Skin:     General: Skin is warm and dry  Capillary Refill: Capillary refill takes less than 2 seconds  Coloration: Skin is not jaundiced  Findings: No bruising, erythema or rash  Neurological:      General: No focal deficit present        Mental Status: She is alert and oriented to person, place, and time  Cranial Nerves: No cranial nerve deficit  Sensory: No sensory deficit  Gait: Gait normal       Deep Tendon Reflexes: Reflexes normal    Psychiatric:         Mood and Affect: Mood normal          Behavior: Behavior normal          Thought Content:  Thought content normal          Judgment: Judgment normal           Heydi Parikh DO

## 2022-12-08 NOTE — PATIENT INSTRUCTIONS
Medicare Preventive Visit Patient Instructions  Thank you for completing your Welcome to Medicare Visit or Medicare Annual Wellness Visit today  Your next wellness visit will be due in one year (12/9/2023)  The screening/preventive services that you may require over the next 5-10 years are detailed below  Some tests may not apply to you based off risk factors and/or age  Screening tests ordered at today's visit but not completed yet may show as past due  Also, please note that scanned in results may not display below  Preventive Screenings:  Service Recommendations Previous Testing/Comments   Colorectal Cancer Screening  * Colonoscopy    * Fecal Occult Blood Test (FOBT)/Fecal Immunochemical Test (FIT)  * Fecal DNA/Cologuard Test  * Flexible Sigmoidoscopy Age: 39-70 years old   Colonoscopy: every 10 years (may be performed more frequently if at higher risk)  OR  FOBT/FIT: every 1 year  OR  Cologuard: every 3 years  OR  Sigmoidoscopy: every 5 years  Screening may be recommended earlier than age 39 if at higher risk for colorectal cancer  Also, an individualized decision between you and your healthcare provider will decide whether screening between the ages of 74-80 would be appropriate  Colonoscopy: 09/16/2019  FOBT/FIT: Not on file  Cologuard: Not on file  Sigmoidoscopy: Not on file          Breast Cancer Screening Age: 36 years old  Frequency: every 1-2 years  Not required if history of left and right mastectomy Mammogram: 06/02/2011        Cervical Cancer Screening Between the ages of 21-29, pap smear recommended once every 3 years  Between the ages of 33-67, can perform pap smear with HPV co-testing every 5 years     Recommendations may differ for women with a history of total hysterectomy, cervical cancer, or abnormal pap smears in past  Pap Smear: Not on file    Screening Not Indicated   Hepatitis C Screening Once for adults born between [facility]  More frequently in patients at high risk for Hepatitis C Hep C Antibody: 01/03/2020    Screening Current   Diabetes Screening 1-2 times per year if you're at risk for diabetes or have pre-diabetes Fasting glucose: 167 mg/dL (10/17/2022)  A1C: 5 9 (10/19/2022)  Screening Not Indicated  History Diabetes   Cholesterol Screening Once every 5 years if you don't have a lipid disorder  May order more often based on risk factors  Lipid panel: 10/17/2022    Screening Not Indicated  History Lipid Disorder     Other Preventive Screenings Covered by Medicare:  1  Abdominal Aortic Aneurysm (AAA) Screening: covered once if your at risk  You're considered to be at risk if you have a family history of AAA  2  Lung Cancer Screening: covers low dose CT scan once per year if you meet all of the following conditions: (1) Age 50-69; (2) No signs or symptoms of lung cancer; (3) Current smoker or have quit smoking within the last 15 years; (4) You have a tobacco smoking history of at least 20 pack years (packs per day multiplied by number of years you smoked); (5) You get a written order from a healthcare provider  3  Glaucoma Screening: covered annually if you're considered high risk: (1) You have diabetes OR (2) Family history of glaucoma OR (3)  aged 48 and older OR (3)  American aged 72 and older  3  Osteoporosis Screening: covered every 2 years if you meet one of the following conditions: (1) You're estrogen deficient and at risk for osteoporosis based off medical history and other findings; (2) Have a vertebral abnormality; (3) On glucocorticoid therapy for more than 3 months; (4) Have primary hyperparathyroidism; (5) On osteoporosis medications and need to assess response to drug therapy  · Last bone density test (DXA Scan): 06/06/2002  5  HIV Screening: covered annually if you're between the age of 12-76  Also covered annually if you are younger than 13 and older than 72 with risk factors for HIV infection   For pregnant patients, it is covered up to 3 times per pregnancy  Immunizations:  Immunization Recommendations   Influenza Vaccine Annual influenza vaccination during flu season is recommended for all persons aged >= 6 months who do not have contraindications   Pneumococcal Vaccine   * Pneumococcal conjugate vaccine = PCV13 (Prevnar 13), PCV15 (Vaxneuvance), PCV20 (Prevnar 20)  * Pneumococcal polysaccharide vaccine = PPSV23 (Pneumovax) Adults 25-60 years old: 1-3 doses may be recommended based on certain risk factors  Adults 72 years old: 1-2 doses may be recommended based off what pneumonia vaccine you previously received   Hepatitis B Vaccine 3 dose series if at intermediate or high risk (ex: diabetes, end stage renal disease, liver disease)   Tetanus (Td) Vaccine - COST NOT COVERED BY MEDICARE PART B Following completion of primary series, a booster dose should be given every 10 years to maintain immunity against tetanus  Td may also be given as tetanus wound prophylaxis  Tdap Vaccine - COST NOT COVERED BY MEDICARE PART B Recommended at least once for all adults  For pregnant patients, recommended with each pregnancy  Shingles Vaccine (Shingrix) - COST NOT COVERED BY MEDICARE PART B  2 shot series recommended in those aged 48 and above     Health Maintenance Due:      Topic Date Due   • DXA SCAN  06/06/2007   • Breast Cancer Screening: Mammogram  06/02/2012   • Colorectal Cancer Screening  09/16/2022   • Hepatitis C Screening  Completed     Immunizations Due:      Topic Date Due   • Hepatitis B Vaccine (1 of 3 - 3-dose series) Never done   • COVID-19 Vaccine (4 - Booster for Varela Peter series) 02/14/2022     Advance Directives   What are advance directives? Advance directives are legal documents that state your wishes and plans for medical care  These plans are made ahead of time in case you lose your ability to make decisions for yourself   Advance directives can apply to any medical decision, such as the treatments you want, and if you want to donate organs  What are the types of advance directives? There are many types of advance directives, and each state has rules about how to use them  You may choose a combination of any of the following:  · Living will: This is a written record of the treatment you want  You can also choose which treatments you do not want, which to limit, and which to stop at a certain time  This includes surgery, medicine, IV fluid, and tube feedings  · Durable power of  for healthcare [facility]): This is a written record that states who you want to make healthcare choices for you when you are unable to make them for yourself  This person, called a proxy, is usually a family member or a friend  You may choose more than 1 proxy  · Do not resuscitate (DNR) order:  A DNR order is used in case your heart stops beating or you stop breathing  It is a request not to have certain forms of treatment, such as CPR  A DNR order may be included in other types of advance directives  · Medical directive: This covers the care that you want if you are in a coma, near death, or unable to make decisions for yourself  You can list the treatments you want for each condition  Treatment may include pain medicine, surgery, blood transfusions, dialysis, IV or tube feedings, and a ventilator (breathing machine)  · Values history: This document has questions about your views, beliefs, and how you feel and think about life  This information can help others choose the care that you would choose  Why are advance directives important? An advance directive helps you control your care  Although spoken wishes may be used, it is better to have your wishes written down  Spoken wishes can be misunderstood, or not followed  Treatments may be given even if you do not want them  An advance directive may make it easier for your family to make difficult choices about your care     Urinary Incontinence   Urinary incontinence (UI)  is when you lose control of your bladder  UI develops because your bladder cannot store or empty urine properly  The 3 most common types of UI are stress incontinence, urge incontinence, or both  Medicines:   · May be given to help strengthen your bladder control  Report any side effects of medication to your healthcare provider  Do pelvic muscle exercises often:  Your pelvic muscles help you stop urinating  Squeeze these muscles tight for 5 seconds, then relax for 5 seconds  Gradually work up to squeezing for 10 seconds  Do 3 sets of 15 repetitions a day, or as directed  This will help strengthen your pelvic muscles and improve bladder control  Train your bladder:  Go to the bathroom at set times, such as every 2 hours, even if you do not feel the urge to go  You can also try to hold your urine when you feel the urge to go  For example, hold your urine for 5 minutes when you feel the urge to go  As that becomes easier, hold your urine for 10 minutes  Self-care:   · Keep a UI record  Write down how often you leak urine and how much you leak  Make a note of what you were doing when you leaked urine  · Drink liquids as directed  You may need to limit the amount of liquid you drink to help control your urine leakage  Do not drink any liquid right before you go to bed  Limit or do not have drinks that contain caffeine or alcohol  · Prevent constipation  Eat a variety of high-fiber foods  Good examples are high-fiber cereals, beans, vegetables, and whole-grain breads  Walking is the best way to trigger your intestines to have a bowel movement  · Exercise regularly and maintain a healthy weight  Weight loss and exercise will decrease pressure on your bladder and help you control your leakage  · Use a catheter as directed  to help empty your bladder  A catheter is a tiny, plastic tube that is put into your bladder to drain your urine  · Go to behavior therapy as directed    Behavior therapy may be used to help you learn to control your urge to urinate  Weight Management   Why it is important to manage your weight:  Being overweight increases your risk of health conditions such as heart disease, high blood pressure, type 2 diabetes, and certain types of cancer  It can also increase your risk for osteoarthritis, sleep apnea, and other respiratory problems  Aim for a slow, steady weight loss  Even a small amount of weight loss can lower your risk of health problems  How to lose weight safely:  A safe and healthy way to lose weight is to eat fewer calories and get regular exercise  You can lose up about 1 pound a week by decreasing the number of calories you eat by 500 calories each day  Healthy meal plan for weight management:  A healthy meal plan includes a variety of foods, contains fewer calories, and helps you stay healthy  A healthy meal plan includes the following:  · Eat whole-grain foods more often  A healthy meal plan should contain fiber  Fiber is the part of grains, fruits, and vegetables that is not broken down by your body  Whole-grain foods are healthy and provide extra fiber in your diet  Some examples of whole-grain foods are whole-wheat breads and pastas, oatmeal, brown rice, and bulgur  · Eat a variety of vegetables every day  Include dark, leafy greens such as spinach, kale, nikole greens, and mustard greens  Eat yellow and orange vegetables such as carrots, sweet potatoes, and winter squash  · Eat a variety of fruits every day  Choose fresh or canned fruit (canned in its own juice or light syrup) instead of juice  Fruit juice has very little or no fiber  · Eat low-fat dairy foods  Drink fat-free (skim) milk or 1% milk  Eat fat-free yogurt and low-fat cottage cheese  Try low-fat cheeses such as mozzarella and other reduced-fat cheeses  · Choose meat and other protein foods that are low in fat  Choose beans or other legumes such as split peas or lentils   Choose fish, skinless poultry (chicken or turkey), or lean cuts of red meat (beef or pork)  Before you cook meat or poultry, cut off any visible fat  · Use less fat and oil  Try baking foods instead of frying them  Add less fat, such as margarine, sour cream, regular salad dressing and mayonnaise to foods  Eat fewer high-fat foods  Some examples of high-fat foods include french fries, doughnuts, ice cream, and cakes  · Eat fewer sweets  Limit foods and drinks that are high in sugar  This includes candy, cookies, regular soda, and sweetened drinks  Exercise:  Exercise at least 30 minutes per day on most days of the week  Some examples of exercise include walking, biking, dancing, and swimming  You can also fit in more physical activity by taking the stairs instead of the elevator or parking farther away from stores  Ask your healthcare provider about the best exercise plan for you  © Copyright [employer] 2018 Information is for End User's use only and may not be sold, redistributed or otherwise used for commercial purposes  All illustrations and images included in CareNotes® are the copyrighted property of [employer]  or [address] [place] [address]    What to Do if Your Blood Sugar is Low   AMBULATORY CARE:   Low blood sugar levels  (hypoglycemia) can happen with Type 1 and Type 2 diabetes  Low levels are more likely to happen if you use insulin  Hypoglycemia can cause you to have falls, accidents, and injuries  A blood sugar level that gets too low can lead to seizures, coma, and death  Learn to recognize the symptoms early so you can get treatment quickly  When your blood sugar is low you may feel:  · Sweaty    · Nervous or shaky    · Anxious or irritable    · Confused    · A fast, pounding heartbeat    · Extremely hungry    Have someone call your local emergency number (911 in the [address]) if:   · You cannot be woken  · You have a seizure      Call your doctor if:   · You have symptoms of a low blood sugar level, such as trouble thinking, sweating, or a pounding heartbeat  · Your blood sugar level is lower than normal and it does not improve with treatment  · You often have lower blood sugar levels than your target goals  · You have trouble coping with your illness, or you feel anxious or depressed  · You have questions or concerns about your condition or care  What to do if you have symptoms of low blood sugar:   · Check your blood sugar level, if possible  Your blood sugar level is too low if it is at or below 70 mg/dL  · Eat or drink 15 grams of fast-acting carbohydrate  Fast-acting carbohydrates will raise your blood sugar level quickly  Examples of 15 grams of fast-acting carbohydrates:     ? 4 ounces (½ cup) of fruit juice     ? 4 ounces of regular soda    ? 2 tablespoons of raisins     ? 1 tube of glucose gel or 3 to 4 glucose tablets       · Check your blood sugar level 15 minutes later  If the level is still low (less than 100 mg/dL), eat another 15 grams of carbohydrate  When the level returns to 100 mg/dL, eat a snack or meal that contains carbohydrates  This will help prevent another drop in blood sugar  · Teach people close to you how to use your glucagon kit  Your blood sugar may be too low for you to be awake  People need to know when and how to use your kit  Prevent low blood sugar levels:  Prevent low blood sugar by knowing what increases your risk  Ask your healthcare provider for ways to prevent low blood sugar levels  Any of the following can increase your risk of low blood sugar:  · Fasting for tests or procedures    · During or after intense exercise    · Late or postponed meals    · Sleeping (you may need a bedtime snack)     · Drinking alcohol if you use insulin or insulin releasing pills    Follow up with your doctor as directed:  Write down your questions so you remember to ask them during your visits     © Copyright [employer] 2022 Information is for End User's use only and may not be sold, redistributed or otherwise used for commercial purposes  All illustrations and images included in CareNotes® are the copyrighted property of [employer]  or Janae Rendon  The above information is an  only  It is not intended as medical advice for individual conditions or treatments  Talk to your doctor, nurse or pharmacist before following any medical regimen to see if it is safe and effective for you

## 2023-01-05 ENCOUNTER — TELEPHONE (OUTPATIENT)
Dept: FAMILY MEDICINE CLINIC | Facility: CLINIC | Age: 73
End: 2023-01-05

## 2023-01-05 DIAGNOSIS — N32.81 OVERACTIVE BLADDER: Primary | ICD-10-CM

## 2023-01-05 RX ORDER — SOLIFENACIN SUCCINATE 10 MG/1
10 TABLET, FILM COATED ORAL DAILY
Qty: 30 TABLET | Refills: 2 | Status: SHIPPED | OUTPATIENT
Start: 2023-01-05

## 2023-01-05 NOTE — TELEPHONE ENCOUNTER
Patient states that the tolterodine is not helping her and is asking if you could please send something different in for her to try    She is aware to check with pharmacy     [facility] in [place]

## 2023-01-11 DIAGNOSIS — E78.00 HYPERCHOLESTEROLEMIA: ICD-10-CM

## 2023-01-11 DIAGNOSIS — E03.9 HYPOTHYROIDISM, UNSPECIFIED TYPE: ICD-10-CM

## 2023-01-11 RX ORDER — ROSUVASTATIN CALCIUM 10 MG/1
TABLET, COATED ORAL
Qty: 90 TABLET | Refills: 0 | Status: SHIPPED | OUTPATIENT
Start: 2023-01-11

## 2023-01-11 RX ORDER — LEVOTHYROXINE SODIUM 0.03 MG/1
TABLET ORAL
Qty: 90 TABLET | Refills: 0 | Status: SHIPPED | OUTPATIENT
Start: 2023-01-11

## 2023-01-25 ENCOUNTER — VBI (OUTPATIENT)
Dept: ADMINISTRATIVE | Facility: OTHER | Age: 73
End: 2023-01-25

## 2023-02-19 ENCOUNTER — RA CDI HCC (OUTPATIENT)
Dept: OTHER | Facility: HOSPITAL | Age: 73
End: 2023-02-19

## 2023-02-19 NOTE — PROGRESS NOTES
[address]  coding opportunities     E11 [id number]     Chart Reviewed number of suggestions sent to Provider: 1     Patients Insurance     Medicare Insurance: [address] [place] [address]

## 2023-02-23 ENCOUNTER — OFFICE VISIT (OUTPATIENT)
Dept: FAMILY MEDICINE CLINIC | Facility: CLINIC | Age: 73
End: 2023-02-23

## 2023-02-23 VITALS
HEIGHT: 59 IN | SYSTOLIC BLOOD PRESSURE: 118 MMHG | WEIGHT: 177.7 LBS | OXYGEN SATURATION: 93 % | DIASTOLIC BLOOD PRESSURE: 78 MMHG | BODY MASS INDEX: 35.82 KG/M2 | TEMPERATURE: 98.2 F | HEART RATE: 80 BPM

## 2023-02-23 DIAGNOSIS — E03.9 HYPOTHYROIDISM, UNSPECIFIED TYPE: ICD-10-CM

## 2023-02-23 DIAGNOSIS — E78.00 HYPERCHOLESTEROLEMIA: ICD-10-CM

## 2023-02-23 DIAGNOSIS — N32.81 OVERACTIVE BLADDER: ICD-10-CM

## 2023-02-23 DIAGNOSIS — E11.9 TYPE 2 DIABETES MELLITUS WITHOUT COMPLICATION, WITHOUT LONG-TERM CURRENT USE OF INSULIN (HCC): Primary | ICD-10-CM

## 2023-02-23 DIAGNOSIS — I10 ESSENTIAL HYPERTENSION: ICD-10-CM

## 2023-02-23 LAB
ALBUMIN SERPL-MCNC: 4.6 G/DL (ref 3.6–5.1)
ALBUMIN/CREAT UR: 26 MCG/MG CREAT
ALBUMIN/GLOB SERPL: 1.8 (CALC) (ref 1–2.5)
ALP SERPL-CCNC: 56 U/L (ref 37–153)
ALT SERPL-CCNC: 44 U/L (ref 6–29)
AST SERPL-CCNC: 40 U/L (ref 10–35)
BILIRUB SERPL-MCNC: 0.6 MG/DL (ref 0.2–1.2)
BUN SERPL-MCNC: 14 MG/DL (ref 7–25)
BUN/CREAT SERPL: 12 (CALC) (ref 6–22)
CALCIUM SERPL-MCNC: 9.3 MG/DL (ref 8.6–10.4)
CHLORIDE SERPL-SCNC: 106 MMOL/L (ref 98–110)
CHOLEST SERPL-MCNC: 129 MG/DL
CHOLEST/HDLC SERPL: 3.2 (CALC)
CO2 SERPL-SCNC: 21 MMOL/L (ref 20–32)
CREAT SERPL-MCNC: 1.15 MG/DL (ref 0.6–1)
CREAT UR-MCNC: 156 MG/DL (ref 20–275)
EST. AVERAGE GLUCOSE BLD GHB EST-MCNC: 126 MG/DL
EST. AVERAGE GLUCOSE BLD GHB EST-SCNC: 7 MMOL/L
GFR/BSA.PRED SERPLBLD CYS-BASED-ARV: 50 ML/MIN/1.73M2
GLOBULIN SER CALC-MCNC: 2.6 G/DL (CALC) (ref 1.9–3.7)
GLUCOSE SERPL-MCNC: 164 MG/DL (ref 65–99)
HBA1C MFR BLD: 6 % OF TOTAL HGB
HDLC SERPL-MCNC: 40 MG/DL
LDLC SERPL CALC-MCNC: 62 MG/DL (CALC)
MICROALBUMIN UR-MCNC: 4.1 MG/DL
NONHDLC SERPL-MCNC: 89 MG/DL (CALC)
POTASSIUM SERPL-SCNC: 4.2 MMOL/L (ref 3.5–5.3)
PROT SERPL-MCNC: 7.2 G/DL (ref 6.1–8.1)
SODIUM SERPL-SCNC: 141 MMOL/L (ref 135–146)
TRIGL SERPL-MCNC: 205 MG/DL
TSH SERPL-ACNC: 1.81 MIU/L (ref 0.4–4.5)

## 2023-02-23 RX ORDER — SOLIFENACIN SUCCINATE 10 MG/1
10 TABLET, FILM COATED ORAL DAILY
Qty: 90 TABLET | Refills: 2 | Status: SHIPPED | OUTPATIENT
Start: 2023-02-23

## 2023-02-27 NOTE — PROGRESS NOTES
Patient ID: Michael Walker is a 74 y o  female  HPI: 74 y  o female presents for follow up of niddm, OAB,hypothyroidism,  hypertension and hypercholesterolemia  Hgba1c is 6 0 and patient's issues are well controlled  Patient deneis any dyspnea, chest pain or palpitations  SUBJECTIVE    Family History   Problem Relation Age of Onset   • Depression Maternal Uncle    • Diabetes Maternal Uncle      Social History     Socioeconomic History   • Marital status: Single     Spouse name: Not on file   • Number of children: Not on file   • Years of education: completed 12th grade   • Highest education level: Not on file   Occupational History   • Not on file   Tobacco Use   • Smoking status: Never   • Smokeless tobacco: Never   Substance and Sexual Activity   • Alcohol use: Yes     Comment: social   • Drug use: Never   • Sexual activity: Not Currently   Other Topics Concern   • Not on file   Social History Narrative    Daily coffee consumption: ____cp/day    Daily cola consumption: ___ can/day    daily tea consumption: _____cp/day     Social Determinants of Health     Financial Resource Strain: Low Risk    • Difficulty of Paying Living Expenses: Not very hard   Food Insecurity: Not on file   Transportation Needs: No Transportation Needs   • Lack of Transportation (Medical): No   • Lack of Transportation (Non-Medical):  No   Physical Activity: Not on file   Stress: Not on file   Social Connections: Not on file   Intimate Partner Violence: Not on file   Housing Stability: Not on file     Past Medical History:   Diagnosis Date   • Anxiety    • Depression    • Hyperlipidemia    • Stroke [facility])      Past Surgical History:   Procedure Laterality Date   • EYE SURGERY      cosmetic     Allergies   Allergen Reactions   • Statins      myalgia       Current Outpatient Medications:   •  ALPRAZolam (XANAX) 0 25 mg tablet, Take 1 tablet (0 25 mg total) by mouth 3 (three) times a day as needed for anxiety, Disp: 90 tablet, Rfl: 0  • cholecalciferol (VITAMIN D3) 1,000 units tablet, Take 1,000 Units by mouth daily, Disp: , Rfl:   •  citalopram (CeleXA) 20 mg tablet, TAKE 1 & 1/2 (ONE & ONE-HALF) TABLETS BY MOUTH ONCE DAILY, Disp: 135 tablet, Rfl: 3  •  Cyanocobalamin (Vitamin B 12) 500 MCG TABS, Take 1,000 mg by mouth daily, Disp: , Rfl:   •  famotidine (PEPCID) 20 mg tablet, Take 20 mg by mouth 2 (two) times a day, Disp: , Rfl:   •  levothyroxine 25 mcg tablet, Take 1 tablet by mouth once daily, Disp: 90 tablet, Rfl: 0  •  loratadine (CLARITIN) 10 mg tablet, Take 10 mg by mouth daily, Disp: , Rfl:   •  rosuvastatin (CRESTOR) 10 MG tablet, Take 1 tablet by mouth once daily, Disp: 90 tablet, Rfl: 0  •  solifenacin (VESICARE) 10 MG tablet, Take 1 tablet (10 mg total) by mouth daily, Disp: 90 tablet, Rfl: 2    Review of Systems  Constitutional:     Denies fever, chills ,fatigue ,weakness ,weight loss, weight gain     ENT: Denies earache ,loss of hearing ,nosebleed, nasal discharge,nasal congestion ,sore throat ,hoarseness  Pulmonary: Denies shortness of breath ,cough  ,dyspnea on exertion, orthopnea  ,PND   Cardiovascular:  Denies bradycardia , tachycardia  ,palpations, lower extremity edema leg, claudication  Breast:  Denies new or changing breast lumps ,nipple discharge ,nipple changes  Abdomen:  Denies abdominal pain , anorexia , indigestion, nausea, vomiting, constipation, diarrhea  Musculoskeletal: Denies myalgias, arthralgias, joint swelling, joint stiffness , limb pain, limb swelling  Gu: denies dysuria, polyuria  Skin: Denies skin rash, skin lesion, skin wound, itching, dry skin  Neuro: Denies headache, numbness, tingling, confusion, loss of consciousness, dizziness, vertigo  Psychiatric: Denies feelings of depression, suicidal ideation, anxiety, sleep disturbances    OBJECTIVE  /78   Pulse 80   Temp 98 2 °F (36 8 °C)   Ht 4' 11" (1 499 m)   Wt 80 6 kg (177 lb 11 2 oz)   SpO2 93%   BMI 35 89 kg/m²   Constitutional:   NAD, well appearing and well nourished      ENT:   Conjunctiva and lids: no injection, edema, or discharge     Pupils and iris: HENRY bilaterally    External inspection of ears and nose: normal without deformities or discharge  Otoscopic exam: Canals patent without erythema  Nasal mucosa, septum and turbinates: Normal or edema or discharge         Oropharynx:  Moist mucosa, normal tongue and tonsils without lesions  No erythema        Pulmonary:Respiratory effort normal rate and rhythm, no increased work of breathing  Auscultation of lungs:  Clear bilaterally with no adventitious breath sounds       Cardiovascular: regular rate and rhythm, S1 and S2, no murmur, no edema and/or varicosities of LE      Abdomen: Soft and non-distended     Positive bowel sounds      No heptomegaly or splenomegaly      Gu: no suprapubic tenderness or CVA tenderness, no urethral discharge  Lymphatic:  No anterior or posterior cervical lymphadenopathy         Musculoskeletal:  Gait and station: Normal gait    Digits and nails normal without clubbing or cyanosis       Inspection/palpation of joints, bones, and muscles:  No joint tenderness, swelling, full active and passive range of motion       Skin: Normal skin turgor and no rashes      Neuro:    Normal reflexes     Psych:   alert and oriented to person, place and time     normal mood and affect     Assessment/Plan:Diagnoses and all orders for this visit:    Type 2 diabetes mellitus without complication, without long-term current use of insulin (HCC)  -     Hemoglobin A1C; Future    Overactive bladder  Comments:  Stable on vesicare therapy  Orders:  -     solifenacin (VESICARE) 10 MG tablet; Take 1 tablet (10 mg total) by mouth daily    Essential hypertension  Comments:  Continue curretn therapy  Orders:  -     Comprehensive metabolic panel; Future    Hypothyroidism, unspecified type  Comments:  Cotninue levothyroxine tx      Orders:  -     TSH, 3rd generation with Free T4 reflex; Future    Hypercholesterolemia  Comments:  Parminder statint tx  Orders:  -     Lipid Panel with Direct LDL reflex; Future        Reviewed with patient plan to treat with above plan      Patient instructed to call in 72 hours if not feeling better or if symptoms worsen

## 2023-05-22 ENCOUNTER — VBI (OUTPATIENT)
Dept: ADMINISTRATIVE | Facility: OTHER | Age: 73
End: 2023-05-22

## 2023-06-14 ENCOUNTER — ESTABLISHED COMPREHENSIVE EXAM (OUTPATIENT)
Dept: URBAN - METROPOLITAN AREA CLINIC 6 | Facility: CLINIC | Age: 73
End: 2023-06-14

## 2023-06-14 DIAGNOSIS — H40.033: ICD-10-CM

## 2023-06-14 DIAGNOSIS — H25.813: ICD-10-CM

## 2023-06-14 DIAGNOSIS — H35.3130: ICD-10-CM

## 2023-06-14 PROCEDURE — 92014 COMPRE OPH EXAM EST PT 1/>: CPT

## 2023-06-14 PROCEDURE — 92020 GONIOSCOPY: CPT

## 2023-06-14 PROCEDURE — 92134 CPTRZ OPH DX IMG PST SGM RTA: CPT

## 2023-06-14 ASSESSMENT — TONOMETRY
OS_IOP_MMHG: 21
OD_IOP_MMHG: 21

## 2023-06-14 ASSESSMENT — VISUAL ACUITY
OD_GLARE: 20/80
OU_SC: 20/50
OS_SC: 20/60
OU_CC: J2
OS_GLARE: 20/200
OD_SC: 20/50+2

## 2023-06-22 ENCOUNTER — APPOINTMENT (OUTPATIENT)
Dept: LAB | Facility: CLINIC | Age: 73
End: 2023-06-22
Payer: COMMERCIAL

## 2023-06-22 DIAGNOSIS — E11.9 TYPE 2 DIABETES MELLITUS WITHOUT COMPLICATION, WITHOUT LONG-TERM CURRENT USE OF INSULIN (HCC): ICD-10-CM

## 2023-06-22 DIAGNOSIS — E78.00 HYPERCHOLESTEROLEMIA: ICD-10-CM

## 2023-06-22 DIAGNOSIS — E03.9 HYPOTHYROIDISM, UNSPECIFIED TYPE: ICD-10-CM

## 2023-06-22 DIAGNOSIS — I10 ESSENTIAL HYPERTENSION: ICD-10-CM

## 2023-06-22 LAB
ALBUMIN SERPL BCP-MCNC: 3.6 G/DL (ref 3.5–5)
ALP SERPL-CCNC: 52 U/L (ref 46–116)
ALT SERPL W P-5'-P-CCNC: 36 U/L (ref 12–78)
ANION GAP SERPL CALCULATED.3IONS-SCNC: 3 MMOL/L
AST SERPL W P-5'-P-CCNC: 20 U/L (ref 5–45)
BILIRUB SERPL-MCNC: 0.44 MG/DL (ref 0.2–1)
BUN SERPL-MCNC: 24 MG/DL (ref 5–25)
CALCIUM SERPL-MCNC: 8.9 MG/DL (ref 8.3–10.1)
CHLORIDE SERPL-SCNC: 111 MMOL/L (ref 96–108)
CHOLEST SERPL-MCNC: 129 MG/DL
CO2 SERPL-SCNC: 27 MMOL/L (ref 21–32)
CREAT SERPL-MCNC: 1.04 MG/DL (ref 0.6–1.3)
EST. AVERAGE GLUCOSE BLD GHB EST-MCNC: 126 MG/DL
GFR SERPL CREATININE-BSD FRML MDRD: 53 ML/MIN/1.73SQ M
GLUCOSE P FAST SERPL-MCNC: 139 MG/DL (ref 65–99)
HBA1C MFR BLD: 6 %
HDLC SERPL-MCNC: 37 MG/DL
LDLC SERPL CALC-MCNC: 56 MG/DL (ref 0–100)
POTASSIUM SERPL-SCNC: 3.9 MMOL/L (ref 3.5–5.3)
PROT SERPL-MCNC: 7 G/DL (ref 6.4–8.4)
SODIUM SERPL-SCNC: 141 MMOL/L (ref 135–147)
TRIGL SERPL-MCNC: 180 MG/DL
TSH SERPL DL<=0.05 MIU/L-ACNC: 1.55 UIU/ML (ref 0.45–4.5)

## 2023-06-22 PROCEDURE — 80061 LIPID PANEL: CPT

## 2023-06-22 PROCEDURE — 36415 COLL VENOUS BLD VENIPUNCTURE: CPT

## 2023-06-22 PROCEDURE — 83036 HEMOGLOBIN GLYCOSYLATED A1C: CPT

## 2023-06-22 PROCEDURE — 84443 ASSAY THYROID STIM HORMONE: CPT

## 2023-06-22 PROCEDURE — 80053 COMPREHEN METABOLIC PANEL: CPT

## 2023-06-23 ENCOUNTER — OFFICE VISIT (OUTPATIENT)
Dept: FAMILY MEDICINE CLINIC | Facility: CLINIC | Age: 73
End: 2023-06-23
Payer: COMMERCIAL

## 2023-06-23 VITALS
TEMPERATURE: 97.7 F | DIASTOLIC BLOOD PRESSURE: 84 MMHG | OXYGEN SATURATION: 96 % | BODY MASS INDEX: 35.89 KG/M2 | HEIGHT: 59 IN | SYSTOLIC BLOOD PRESSURE: 126 MMHG | HEART RATE: 66 BPM

## 2023-06-23 DIAGNOSIS — I10 ESSENTIAL HYPERTENSION: ICD-10-CM

## 2023-06-23 DIAGNOSIS — E66.01 OBESITY, MORBID (HCC): ICD-10-CM

## 2023-06-23 DIAGNOSIS — E78.00 HYPERCHOLESTEROLEMIA: ICD-10-CM

## 2023-06-23 DIAGNOSIS — N32.81 OVERACTIVE BLADDER: ICD-10-CM

## 2023-06-23 DIAGNOSIS — Z12.11 COLON CANCER SCREENING: ICD-10-CM

## 2023-06-23 DIAGNOSIS — F33.42 RECURRENT MAJOR DEPRESSIVE DISORDER, IN FULL REMISSION (HCC): ICD-10-CM

## 2023-06-23 DIAGNOSIS — E11.9 TYPE 2 DIABETES MELLITUS WITHOUT COMPLICATION, WITHOUT LONG-TERM CURRENT USE OF INSULIN (HCC): Primary | ICD-10-CM

## 2023-06-23 DIAGNOSIS — E03.9 HYPOTHYROIDISM, UNSPECIFIED TYPE: ICD-10-CM

## 2023-06-23 DIAGNOSIS — N18.31 STAGE 3A CHRONIC KIDNEY DISEASE (HCC): ICD-10-CM

## 2023-06-23 PROCEDURE — 99214 OFFICE O/P EST MOD 30 MIN: CPT | Performed by: FAMILY MEDICINE

## 2023-06-27 NOTE — PROGRESS NOTES
Patient ID: Summer Lazar is a 74 y o  female  HPI: 74 y  o female presents for follow up of niddm, hypertension, hypothyroidism, depression, chronic kidney disease and hypercholesterolemia  Hgba1c is  6 0  and patient's issues are well controlled  Patient deneis any dyspnea, chest pain or palpitations  Her overactive bladder symptoms are not controlled and she would like to try a different agent  SUBJECTIVE    Family History   Problem Relation Age of Onset   • Depression Maternal Uncle    • Diabetes Maternal Uncle    • Mental illness Maternal Uncle         Killed himself was 29   • Completed Suicide  Maternal Uncle         Had infantile diabetes   • Glaucoma Sister      Social History     Socioeconomic History   • Marital status: Single     Spouse name: Not on file   • Number of children: Not on file   • Years of education: completed 12th grade   • Highest education level: Not on file   Occupational History   • Not on file   Tobacco Use   • Smoking status: Never     Passive exposure: Past   • Smokeless tobacco: Never   Substance and Sexual Activity   • Alcohol use: Yes     Comment: Occasionally with meal   • Drug use: Never   • Sexual activity: Not Currently     Birth control/protection: Abstinence   Other Topics Concern   • Not on file   Social History Narrative    Daily coffee consumption: ____cp/day    Daily cola consumption: ___ can/day    daily tea consumption: _____cp/day     Social Determinants of Health     Financial Resource Strain: Low Risk  (12/1/2022)    Overall Financial Resource Strain (CARDIA)    • Difficulty of Paying Living Expenses: Not very hard   Food Insecurity: Not on file   Transportation Needs: No Transportation Needs (12/1/2022)    PRAPARE - Transportation    • Lack of Transportation (Medical): No    • Lack of Transportation (Non-Medical):  No   Physical Activity: Not on file   Stress: Not on file   Social Connections: Not on file   Intimate Partner Violence: Not on file   Housing Stability: Not on file     Past Medical History:   Diagnosis Date   • Anxiety    • Depression    • Disease of thyroid gland    • Hyperlipidemia    • Stroke ([facility] [address] )    • Visual impairment      Past Surgical History:   Procedure Laterality Date   • EYE SURGERY      cosmetic     Allergies   Allergen Reactions   • Statins      myalgia       Current Outpatient Medications:   •  ALPRAZolam (XANAX) 0 25 mg tablet, Take 1 tablet (0 25 mg total) by mouth 3 (three) times a day as needed for anxiety, Disp: 90 tablet, Rfl: 0  •  cholecalciferol (VITAMIN D3) 1,000 units tablet, Take 1,000 Units by mouth daily, Disp: , Rfl:   •  citalopram (CeleXA) 20 mg tablet, TAKE 1 & 1/2 (ONE & ONE-HALF) TABLETS BY MOUTH ONCE DAILY, Disp: 135 tablet, Rfl: 3  •  Cyanocobalamin (Vitamin B 12) 500 MCG TABS, Take 1,000 mg by mouth daily, Disp: , Rfl:   •  famotidine (PEPCID) 20 mg tablet, Take 20 mg by mouth 2 (two) times a day, Disp: , Rfl:   •  levothyroxine 25 mcg tablet, Take 1 tablet by mouth once daily, Disp: 90 tablet, Rfl: 0  •  loratadine (CLARITIN) 10 mg tablet, Take 10 mg by mouth daily, Disp: , Rfl:   •  rosuvastatin (CRESTOR) 10 MG tablet, Take 1 tablet by mouth once daily, Disp: 90 tablet, Rfl: 0  •  solifenacin (VESICARE) 10 MG tablet, Take 1 tablet (10 mg total) by mouth daily, Disp: 90 tablet, Rfl: 2  BMI Counseling: Body mass index is 35 89 kg/m²  The BMI is above normal  Nutrition recommendations include reducing portion sizes, decreasing overall calorie intake and moderation in carbohydrate intake    Review of Systems  Constitutional:     Denies fever, chills ,fatigue ,weakness ,weight loss, weight gain     ENT: Denies earache ,loss of hearing ,nosebleed, nasal discharge,nasal congestion ,sore throat ,hoarseness  Pulmonary: Denies shortness of breath ,cough  ,dyspnea on exertion, orthopnea  ,PND   Cardiovascular:  Denies bradycardia , tachycardia  ,palpations, lower extremity edema leg, claudication  Breast:  Denies new or "changing breast lumps ,nipple discharge ,nipple changes  Abdomen:  Denies abdominal pain , anorexia , indigestion, nausea, vomiting, constipation, diarrhea  Musculoskeletal: Denies myalgias, arthralgias, joint swelling, joint stiffness , limb pain, limb swelling  Gu: denies dysuria, polyuria  Skin: Denies skin rash, skin lesion, skin wound, itching, dry skin  Neuro: Denies headache, numbness, tingling, confusion, loss of consciousness, dizziness, vertigo  Psychiatric: Denies feelings of depression, suicidal ideation, anxiety, sleep disturbances    OBJECTIVE  /84   Pulse 66   Temp 97 7 °F (36 5 °C)   Ht 4' 11\" (1 499 m)   SpO2 96%   BMI 35 89 kg/m²   Constitutional:   NAD, well appearing and well nourished      ENT:   Conjunctiva and lids: no injection, edema, or discharge     Pupils and iris: HENRY bilaterally    External inspection of ears and nose: normal without deformities or discharge  Otoscopic exam: Canals patent without erythema  Nasal mucosa, septum and turbinates: Normal or edema or discharge         Oropharynx:  Moist mucosa, normal tongue and tonsils without lesions  No erythema        Pulmonary:Respiratory effort normal rate and rhythm, no increased work of breathing   Auscultation of lungs:  Clear bilaterally with no adventitious breath sounds       Cardiovascular: regular rate and rhythm, S1 and S2, no murmur, no edema and/or varicosities of LE      Abdomen: Soft and non-distended     Positive bowel sounds      No heptomegaly or splenomegaly      Gu: no suprapubic tenderness or CVA tenderness, no urethral discharge  Lymphatic:  No anterior or posterior cervical lymphadenopathy         Musculoskeletal:  Gait and station: Normal gait      Digits and nails normal without clubbing or cyanosis       Inspection/palpation of joints, bones, and muscles:  No joint tenderness, swelling, full active and passive range of motion       Skin: Normal skin turgor and no rashes      Neuro:  " Normal reflexes     Psych:   alert and oriented to person, place and time     normal mood and affect       Assessment/Plan:Diagnoses and all orders for this visit:    Type 2 diabetes mellitus without complication, without long-term current use of insulin ([facility] )  Comments:  Continue with diet control only  Orders:  -     Hemoglobin A1c (w/out EAG) ([facility] ONLY); Future    Essential hypertension  Comments:  Stable on current therapy  Orders:  -     Comprehensive metabolic panel; Future    Hypercholesterolemia  Comments:  Stable on statin therapy  Orders:  -     Lipid Panel with Direct LDL reflex; Future    Hypothyroidism, unspecified type  Comments:  Continue levothyroxine therapy  Orders:  -     TSH, 3rd generation; Future    Recurrent major depressive disorder, in full remission ([facility] )  Comments:  Stable on current therapy of citalopram     Stage 3a chronic kidney disease ([facility] )  Comments:  Continue with renal surveillance    Obesity, morbid ([place] [address] )  Comments:  Continue with diet and exercise  Overactive bladder  Comments:  Trial of Vesicare therapy  Colon cancer screening  -     Cologuard        Reviewed with patient plan to treat with above plan      Patient instructed to call in 72 hours if not feeling better or if symptoms worsen

## 2023-07-10 DIAGNOSIS — E03.9 HYPOTHYROIDISM, UNSPECIFIED TYPE: ICD-10-CM

## 2023-07-10 DIAGNOSIS — E78.00 HYPERCHOLESTEROLEMIA: ICD-10-CM

## 2023-07-10 RX ORDER — LEVOTHYROXINE SODIUM 0.03 MG/1
25 TABLET ORAL DAILY
Qty: 90 TABLET | Refills: 0 | Status: SHIPPED | OUTPATIENT
Start: 2023-07-10

## 2023-07-10 RX ORDER — ROSUVASTATIN CALCIUM 10 MG/1
10 TABLET, COATED ORAL DAILY
Qty: 90 TABLET | Refills: 0 | Status: SHIPPED | OUTPATIENT
Start: 2023-07-10

## 2023-07-18 LAB — COLOGUARD RESULT REPORTABLE: NEGATIVE

## 2023-07-25 ENCOUNTER — CATARACT CONSULTATION (OUTPATIENT)
Dept: URBAN - METROPOLITAN AREA CLINIC 6 | Facility: CLINIC | Age: 73
End: 2023-07-25

## 2023-07-25 DIAGNOSIS — H35.3130: ICD-10-CM

## 2023-07-25 DIAGNOSIS — H40.033: ICD-10-CM

## 2023-07-25 DIAGNOSIS — H25.813: ICD-10-CM

## 2023-07-25 PROCEDURE — 92014 COMPRE OPH EXAM EST PT 1/>: CPT

## 2023-07-25 PROCEDURE — 92136 OPHTHALMIC BIOMETRY: CPT

## 2023-07-25 ASSESSMENT — TONOMETRY
OS_IOP_MMHG: 14
OD_IOP_MMHG: 13

## 2023-07-25 ASSESSMENT — VISUAL ACUITY
OD_PH: 20/50+2
OS_GLARE: 20/200
OU_CC: J2
OS_PH: 20/50+2
OD_GLARE: 20/80
OD_SC: 20/60
OS_SC: 20/60

## 2023-08-01 PROBLEM — G25.0 BENIGN ESSENTIAL TREMOR: Status: ACTIVE | Noted: 2023-08-01

## 2023-08-01 PROBLEM — Z86.79 HISTORY OF SUBDURAL HEMATOMA: Status: ACTIVE | Noted: 2023-08-01

## 2023-08-01 PROBLEM — R26.9 NEUROLOGIC GAIT DYSFUNCTION: Status: ACTIVE | Noted: 2023-08-01

## 2023-08-17 ENCOUNTER — SURGERY/PROCEDURE (OUTPATIENT)
Dept: URBAN - METROPOLITAN AREA SURGICAL CENTER 6 | Facility: SURGICAL CENTER | Age: 73
End: 2023-08-17

## 2023-08-17 DIAGNOSIS — H25.812: ICD-10-CM

## 2023-08-17 PROCEDURE — 66984 XCAPSL CTRC RMVL W/O ECP: CPT

## 2023-08-18 ENCOUNTER — 1 DAY POST-OP (OUTPATIENT)
Dept: URBAN - METROPOLITAN AREA CLINIC 6 | Facility: CLINIC | Age: 73
End: 2023-08-18

## 2023-08-18 DIAGNOSIS — Z96.1: ICD-10-CM

## 2023-08-18 DIAGNOSIS — S05.02XA: ICD-10-CM

## 2023-08-18 DIAGNOSIS — H25.811: ICD-10-CM

## 2023-08-18 PROCEDURE — 76519 ECHO EXAM OF EYE: CPT | Mod: 26,RT

## 2023-08-18 PROCEDURE — 99024 POSTOP FOLLOW-UP VISIT: CPT

## 2023-08-18 ASSESSMENT — VISUAL ACUITY
OD_GLARE: 20/200
OD_SC: 20/60
OD_PH: 20/50+2
OS_SC: 20/100

## 2023-08-18 ASSESSMENT — TONOMETRY
OS_IOP_MMHG: 20
OS_IOP_MMHG: 18
OD_IOP_MMHG: 15

## 2023-08-25 ENCOUNTER — 1 WEEK POST-OP (OUTPATIENT)
Dept: URBAN - METROPOLITAN AREA CLINIC 6 | Facility: CLINIC | Age: 73
End: 2023-08-25

## 2023-08-25 DIAGNOSIS — Z96.1: ICD-10-CM

## 2023-08-25 DIAGNOSIS — S05.02XD: ICD-10-CM

## 2023-08-25 PROCEDURE — 99024 POSTOP FOLLOW-UP VISIT: CPT

## 2023-08-25 ASSESSMENT — TONOMETRY
OD_IOP_MMHG: 12
OS_IOP_MMHG: 12

## 2023-08-25 ASSESSMENT — VISUAL ACUITY
OD_SC: 20/40
OS_SC: 20/25

## 2023-08-28 ENCOUNTER — SURGERY/PROCEDURE (OUTPATIENT)
Dept: URBAN - METROPOLITAN AREA SURGICAL CENTER 6 | Facility: SURGICAL CENTER | Age: 73
End: 2023-08-28

## 2023-08-28 DIAGNOSIS — H25.811: ICD-10-CM

## 2023-08-28 PROCEDURE — 66984 XCAPSL CTRC RMVL W/O ECP: CPT | Mod: 79,RT

## 2023-08-29 ENCOUNTER — 1 DAY POST-OP (OUTPATIENT)
Dept: URBAN - METROPOLITAN AREA CLINIC 6 | Facility: CLINIC | Age: 73
End: 2023-08-29

## 2023-08-29 DIAGNOSIS — S05.02XD: ICD-10-CM

## 2023-08-29 DIAGNOSIS — Z96.1: ICD-10-CM

## 2023-08-29 PROCEDURE — 99024 POSTOP FOLLOW-UP VISIT: CPT

## 2023-08-29 ASSESSMENT — TONOMETRY
OS_IOP_MMHG: 15
OD_IOP_MMHG: 14

## 2023-08-29 ASSESSMENT — VISUAL ACUITY
OD_SC: 20/80
OS_SC: 20/25

## 2023-09-07 ENCOUNTER — 1 WEEK POST-OP (OUTPATIENT)
Dept: URBAN - METROPOLITAN AREA CLINIC 6 | Facility: CLINIC | Age: 73
End: 2023-09-07

## 2023-09-07 DIAGNOSIS — H49.12: ICD-10-CM

## 2023-09-07 DIAGNOSIS — Z96.1: ICD-10-CM

## 2023-09-07 LAB
DIPLOPIA-BTEA- ARCNJ: NORMAL

## 2023-09-07 PROCEDURE — 99024 POSTOP FOLLOW-UP VISIT: CPT

## 2023-09-07 ASSESSMENT — VISUAL ACUITY
OD_OTHER: 1 WEEK POST OP
OS_SC: 20/25
OD_SC: 20/25

## 2023-09-07 ASSESSMENT — TONOMETRY
OS_IOP_MMHG: 10
OD_IOP_MMHG: 13

## 2023-09-25 ENCOUNTER — APPOINTMENT (OUTPATIENT)
Dept: LAB | Facility: CLINIC | Age: 73
End: 2023-09-25
Payer: COMMERCIAL

## 2023-09-25 ENCOUNTER — TRANSCRIBE ORDERS (OUTPATIENT)
Dept: LAB | Facility: CLINIC | Age: 73
End: 2023-09-25

## 2023-09-25 DIAGNOSIS — E03.9 HYPOTHYROIDISM, UNSPECIFIED TYPE: ICD-10-CM

## 2023-09-25 DIAGNOSIS — Z00.00 ROUTINE GENERAL MEDICAL EXAMINATION AT A HEALTH CARE FACILITY: Primary | ICD-10-CM

## 2023-09-25 DIAGNOSIS — I10 ESSENTIAL HYPERTENSION: ICD-10-CM

## 2023-09-25 DIAGNOSIS — E11.9 TYPE 2 DIABETES MELLITUS WITHOUT COMPLICATION, WITHOUT LONG-TERM CURRENT USE OF INSULIN (HCC): ICD-10-CM

## 2023-09-25 DIAGNOSIS — Z00.00 ROUTINE GENERAL MEDICAL EXAMINATION AT A HEALTH CARE FACILITY: ICD-10-CM

## 2023-09-25 DIAGNOSIS — E11.9 TYPE 2 DIABETES MELLITUS WITHOUT COMPLICATION, WITHOUT LONG-TERM CURRENT USE OF INSULIN (HCC): Primary | ICD-10-CM

## 2023-09-25 DIAGNOSIS — E78.00 HYPERCHOLESTEROLEMIA: ICD-10-CM

## 2023-09-25 LAB
ALBUMIN SERPL BCP-MCNC: 4.1 G/DL (ref 3.5–5)
ALP SERPL-CCNC: 43 U/L (ref 34–104)
ALT SERPL W P-5'-P-CCNC: 35 U/L (ref 7–52)
ANION GAP SERPL CALCULATED.3IONS-SCNC: 10 MMOL/L
AST SERPL W P-5'-P-CCNC: 28 U/L (ref 13–39)
BILIRUB SERPL-MCNC: 0.53 MG/DL (ref 0.2–1)
BUN SERPL-MCNC: 18 MG/DL (ref 5–25)
CALCIUM SERPL-MCNC: 9.5 MG/DL (ref 8.4–10.2)
CHLORIDE SERPL-SCNC: 107 MMOL/L (ref 96–108)
CHOLEST SERPL-MCNC: 134 MG/DL
CO2 SERPL-SCNC: 23 MMOL/L (ref 21–32)
CREAT SERPL-MCNC: 0.89 MG/DL (ref 0.6–1.3)
GFR SERPL CREATININE-BSD FRML MDRD: 64 ML/MIN/1.73SQ M
GLUCOSE P FAST SERPL-MCNC: 136 MG/DL (ref 65–99)
HDLC SERPL-MCNC: 40 MG/DL
LDLC SERPL CALC-MCNC: 53 MG/DL (ref 0–100)
POTASSIUM SERPL-SCNC: 3.8 MMOL/L (ref 3.5–5.3)
PROT SERPL-MCNC: 6.9 G/DL (ref 6.4–8.4)
SODIUM SERPL-SCNC: 140 MMOL/L (ref 135–147)
TRIGL SERPL-MCNC: 205 MG/DL
TSH SERPL DL<=0.05 MIU/L-ACNC: 1.28 UIU/ML (ref 0.45–4.5)

## 2023-09-25 PROCEDURE — 80053 COMPREHEN METABOLIC PANEL: CPT

## 2023-09-25 PROCEDURE — 86255 FLUORESCENT ANTIBODY SCREEN: CPT

## 2023-09-25 PROCEDURE — 84443 ASSAY THYROID STIM HORMONE: CPT

## 2023-09-25 PROCEDURE — 83519 RIA NONANTIBODY: CPT

## 2023-09-25 PROCEDURE — 80061 LIPID PANEL: CPT

## 2023-09-29 ENCOUNTER — ESTABLISHED (OUTPATIENT)
Dept: URBAN - METROPOLITAN AREA CLINIC 6 | Facility: CLINIC | Age: 73
End: 2023-09-29

## 2023-09-29 DIAGNOSIS — H49.11: ICD-10-CM

## 2023-09-29 DIAGNOSIS — H35.3130: ICD-10-CM

## 2023-09-29 PROCEDURE — 92012 INTRM OPH EXAM EST PATIENT: CPT | Mod: 24

## 2023-09-29 PROCEDURE — 92060 SENSORIMOTOR EXAMINATION: CPT | Mod: 24

## 2023-09-29 ASSESSMENT — VISUAL ACUITY
OD_SC: 20/25
OS_SC: 20/30

## 2023-10-04 LAB — MISCELLANEOUS LAB TEST RESULT: NORMAL

## 2023-10-06 ENCOUNTER — TELEPHONE (OUTPATIENT)
Dept: ADMINISTRATIVE | Facility: OTHER | Age: 73
End: 2023-10-06

## 2023-10-06 ENCOUNTER — POST-OP CHECK (OUTPATIENT)
Dept: URBAN - METROPOLITAN AREA CLINIC 6 | Facility: CLINIC | Age: 73
End: 2023-10-06

## 2023-10-06 DIAGNOSIS — Z96.1: ICD-10-CM

## 2023-10-06 DIAGNOSIS — H49.11: ICD-10-CM

## 2023-10-06 PROCEDURE — 99024 POSTOP FOLLOW-UP VISIT: CPT

## 2023-10-06 ASSESSMENT — VISUAL ACUITY
OS_SC: 20/30
OD_SC: 20/25

## 2023-10-06 ASSESSMENT — TONOMETRY
OD_IOP_MMHG: 13
OS_IOP_MMHG: 14

## 2023-10-06 NOTE — TELEPHONE ENCOUNTER
Upon review of the In Basket request we were able to locate, review, and update the patient chart as requested for Diabetic Eye Exam.    Any additional questions or concerns should be emailed to the Practice Liaisons via the appropriate education email address, please do not reply via In Basket.     Thank you  Jyoti Canales MA

## 2023-10-06 NOTE — TELEPHONE ENCOUNTER
----- Message from Cele Clancy, [address] sent at 10/6/2023  9:27 AM EDT -----  Regarding: care gap request  10/06/23 9:27 AM    Hello, our patient attached above has had Diabetic Eye Exam completed/performed. Please assist in updating the patient chart by pulling the document from the Media Tab. The date of service is 2022.      Thank you,  Cele Clancy  [facility]

## 2023-10-09 ENCOUNTER — TELEPHONE (OUTPATIENT)
Dept: ADMINISTRATIVE | Facility: OTHER | Age: 73
End: 2023-10-09

## 2023-10-09 NOTE — TELEPHONE ENCOUNTER
Upon review of the In Basket request we were able to note that no further action is required. The patient chart is up to date as a result of a previous request.      Any additional questions or concerns should be emailed to the Practice Liaisons via the appropriate education email address, please do not reply via In Basket.     Thank you  Maikol Brown MA

## 2023-10-09 NOTE — TELEPHONE ENCOUNTER
----- Message from Cathy Barraza, [address] sent at 10/6/2023  9:27 AM EDT -----  Regarding: care gap request  10/06/23 9:27 AM    Hello, our patient attached above has had Diabetic Eye Exam completed/performed. Please assist in updating the patient chart by pulling the document from the Media Tab. The date of service is 2022.      Thank you,  Cathy Barraza  [facility]

## 2023-10-17 ENCOUNTER — RA CDI HCC (OUTPATIENT)
Dept: OTHER | Facility: HOSPITAL | Age: 73
End: 2023-10-17

## 2023-10-17 NOTE — PROGRESS NOTES
[address] [facility] coding opportunities       Chart reviewed, no opportunity found: CHART REVIEWED, NO OPPORTUNITY FOUND        Patients Insurance     Medicare Insurance: [employer] Complete

## 2023-10-18 DIAGNOSIS — E03.9 HYPOTHYROIDISM, UNSPECIFIED TYPE: ICD-10-CM

## 2023-10-18 DIAGNOSIS — E78.00 HYPERCHOLESTEROLEMIA: ICD-10-CM

## 2023-10-18 RX ORDER — LEVOTHYROXINE SODIUM 0.03 MG/1
25 TABLET ORAL DAILY
Qty: 90 TABLET | Refills: 1 | Status: SHIPPED | OUTPATIENT
Start: 2023-10-18

## 2023-10-18 RX ORDER — ROSUVASTATIN CALCIUM 10 MG/1
10 TABLET, COATED ORAL DAILY
Qty: 90 TABLET | Refills: 1 | Status: SHIPPED | OUTPATIENT
Start: 2023-10-18

## 2023-10-18 NOTE — TELEPHONE ENCOUNTER
Reason for call:   [x] Refill   [] Prior Auth  [] Other:     Office:   [x] PCP/Provider -   [] Specialty/Provider -     Medication: crestor, levothyroxine     Dose/Frequency: 10 mg, 25 mcg/ daily     Quantity: 90 day supply     Pharmacy: [address] [place] [address]     Does the patient have enough for 3 days?    [] Yes   [x] No - Send as HP to POD

## 2023-10-23 ENCOUNTER — TELEPHONE (OUTPATIENT)
Dept: FAMILY MEDICINE CLINIC | Facility: CLINIC | Age: 73
End: 2023-10-23

## 2023-11-13 DIAGNOSIS — F33.42 RECURRENT MAJOR DEPRESSIVE DISORDER, IN FULL REMISSION (HCC): ICD-10-CM

## 2023-11-13 RX ORDER — CITALOPRAM 20 MG/1
TABLET ORAL
Qty: 135 TABLET | Refills: 1 | Status: SHIPPED | OUTPATIENT
Start: 2023-11-13

## 2023-11-13 NOTE — TELEPHONE ENCOUNTER
Reason for call:   [x] Refill   [] Prior Auth  [] Other:     Office:   [x] PCP/Provider -   [] Specialty/Provider -     Medication: Celexa    Dose/Frequency: 20 mg     Quantity: #135 with 1 refill per patient    Pharmacy: [address] [place] [address]    Does the patient have enough for 3 days?    [] Yes   [x] No - Send as HP to POD

## 2023-11-16 ENCOUNTER — OFFICE VISIT (OUTPATIENT)
Dept: FAMILY MEDICINE CLINIC | Facility: CLINIC | Age: 73
End: 2023-11-16
Payer: COMMERCIAL

## 2023-11-16 VITALS
HEIGHT: 59 IN | BODY MASS INDEX: 35.89 KG/M2 | TEMPERATURE: 98.1 F | OXYGEN SATURATION: 95 % | SYSTOLIC BLOOD PRESSURE: 124 MMHG | DIASTOLIC BLOOD PRESSURE: 78 MMHG | HEART RATE: 67 BPM

## 2023-11-16 DIAGNOSIS — E11.9 TYPE 2 DIABETES MELLITUS WITHOUT COMPLICATION, WITHOUT LONG-TERM CURRENT USE OF INSULIN (HCC): Primary | ICD-10-CM

## 2023-11-16 DIAGNOSIS — F33.42 RECURRENT MAJOR DEPRESSIVE DISORDER, IN FULL REMISSION (HCC): ICD-10-CM

## 2023-11-16 DIAGNOSIS — N18.31 STAGE 3A CHRONIC KIDNEY DISEASE (HCC): ICD-10-CM

## 2023-11-16 DIAGNOSIS — E03.9 HYPOTHYROIDISM, UNSPECIFIED TYPE: ICD-10-CM

## 2023-11-16 DIAGNOSIS — I10 ESSENTIAL HYPERTENSION: ICD-10-CM

## 2023-11-16 DIAGNOSIS — E78.00 HYPERCHOLESTEROLEMIA: ICD-10-CM

## 2023-11-16 DIAGNOSIS — E66.01 OBESITY, MORBID (HCC): ICD-10-CM

## 2023-11-16 PROCEDURE — 99214 OFFICE O/P EST MOD 30 MIN: CPT | Performed by: FAMILY MEDICINE

## 2023-11-21 NOTE — PROGRESS NOTES
Patient ID: Alexis Thomas is a 74 y.o. female. HPI: 74 y. o.female presents for follow up of niddm, hypertension ,hypothyroidism, depression,CKD and hypercholesterolemia. Hgba1c is  6.3 and patient's issues are well controlled. Patient deneis any dyspnea, chest pain or palpitations. SUBJECTIVE    Family History   Problem Relation Age of Onset    Depression Maternal Uncle     Diabetes Maternal Uncle     Mental illness Maternal Uncle         Killed himself was 29    Completed Suicide  Maternal Uncle         Had infantile diabetes    Glaucoma Sister      Social History     Socioeconomic History    Marital status: Single     Spouse name: Not on file    Number of children: Not on file    Years of education: completed 12th grade    Highest education level: Not on file   Occupational History    Not on file   Tobacco Use    Smoking status: Never     Passive exposure: Past    Smokeless tobacco: Never   Substance and Sexual Activity    Alcohol use: Yes     Comment: Occasionally with meal    Drug use: Never    Sexual activity: Not Currently     Birth control/protection: Abstinence   Other Topics Concern    Not on file   Social History Narrative    Daily coffee consumption: ____cp/day    Daily cola consumption: ___ can/day    daily tea consumption: _____cp/day     Social Determinants of Health     Financial Resource Strain: Low Risk  (12/1/2022)    Overall Financial Resource Strain (CARDIA)     Difficulty of Paying Living Expenses: Not very hard   Food Insecurity: Not on file   Transportation Needs: No Transportation Needs (12/1/2022)    PRAPARE - Transportation     Lack of Transportation (Medical): No     Lack of Transportation (Non-Medical):  No   Physical Activity: Not on file   Stress: Not on file   Social Connections: Not on file   Intimate Partner Violence: Not on file   Housing Stability: Not on file     Past Medical History:   Diagnosis Date    Anxiety     Depression     Disease of thyroid gland Hyperlipidemia     Stroke ([address])     Visual impairment      Past Surgical History:   Procedure Laterality Date    EYE SURGERY      cosmetic     Allergies   Allergen Reactions    Statins      myalgia       Current Outpatient Medications:     ALPRAZolam (XANAX) 0.25 mg tablet, Take 1 tablet (0.25 mg total) by mouth 3 (three) times a day as needed for anxiety, Disp: 90 tablet, Rfl: 0    cholecalciferol (VITAMIN D3) 1,000 units tablet, Take 1,000 Units by mouth daily, Disp: , Rfl:     citalopram (CeleXA) 20 mg tablet, TAKE 1 & 1/2 (ONE & ONE-HALF) TABLETS BY MOUTH ONCE DAILY, Disp: 135 tablet, Rfl: 1    Cyanocobalamin (Vitamin B 12) 500 MCG TABS, Take 1,000 mg by mouth daily, Disp: , Rfl:     levothyroxine 25 mcg tablet, Take 1 tablet (25 mcg total) by mouth daily, Disp: 90 tablet, Rfl: 1    rosuvastatin (CRESTOR) 10 MG tablet, Take 1 tablet (10 mg total) by mouth daily, Disp: 90 tablet, Rfl: 1    solifenacin (VESICARE) 10 MG tablet, Take 1 tablet (10 mg total) by mouth daily, Disp: 90 tablet, Rfl: 2    famotidine (PEPCID) 20 mg tablet, Take 20 mg by mouth 2 (two) times a day (Patient not taking: Reported on 11/16/2023), Disp: , Rfl:     loratadine (CLARITIN) 10 mg tablet, Take 10 mg by mouth daily (Patient not taking: Reported on 11/16/2023), Disp: , Rfl:     Review of Systems  Constitutional:     Denies fever, chills ,fatigue ,weakness ,weight loss, weight gain     ENT: Denies earache ,loss of hearing ,nosebleed, nasal discharge,nasal congestion ,sore throat ,hoarseness  Pulmonary: Denies shortness of breath ,cough  ,dyspnea on exertion, orthopnea  ,PND   Cardiovascular:  Denies bradycardia , tachycardia  ,palpations, lower extremity edema leg, claudication  Breast:  Denies new or changing breast lumps ,nipple discharge ,nipple changes  Abdomen:  Denies abdominal pain , anorexia , indigestion, nausea, vomiting, constipation, diarrhea  Musculoskeletal: Denies myalgias, arthralgias, joint swelling, joint stiffness , limb pain, limb swelling  Gu: denies dysuria, polyuria  Skin: Denies skin rash, skin lesion, skin wound, itching, dry skin  Neuro: Denies headache, numbness, tingling, confusion, loss of consciousness, dizziness, vertigo  Psychiatric: Denies feelings of depression, suicidal ideation, anxiety, sleep disturbances    OBJECTIVE  /78   Pulse 67   Temp 98.1 °F (36.7 °C)   Ht 4' 11" (1.499 m)   SpO2 95%   BMI 35.89 kg/m²   Constitutional:   NAD, well appearing and well nourished      ENT:   Conjunctiva and lids: no injection, edema, or discharge     Pupils and iris: HENRY bilaterally    External inspection of ears and nose: normal without deformities or discharge. Otoscopic exam: Canals patent without erythema. Nasal mucosa, septum and turbinates: Normal or edema or discharge         Oropharynx:  Moist mucosa, normal tongue and tonsils without lesions. No erythema        Pulmonary:Respiratory effort normal rate and rhythm, no increased work of breathing. Auscultation of lungs:  Clear bilaterally with no adventitious breath sounds       Cardiovascular: regular rate and rhythm, S1 and S2, no murmur, no edema and/or varicosities of LE      Abdomen: Soft and non-distended     Positive bowel sounds      No heptomegaly or splenomegaly      Gu: no suprapubic tenderness or CVA tenderness, no urethral discharge  Lymphatic:  No anterior or posterior cervical lymphadenopathy         Musculoskeletal:  Gait and station: Normal gait      Digits and nails normal without clubbing or cyanosis       Inspection/palpation of joints, bones, and muscles:  No joint tenderness, swelling, full active and passive range of motion       Skin: Normal skin turgor and no rashes      Neuro:    Normal reflexes     Psych:   alert and oriented to person, place and time     normal mood and affect   Patient's shoes and socks removed. Right Foot/Ankle   Right Foot Inspection  Skin Exam: skin normal and skin intact.  No dry skin, no warmth, no callus, no erythema, no maceration, no abnormal color, no pre-ulcer, no ulcer and no callus. Toe Exam: ROM and strength within normal limits. No swelling, no tenderness and erythema    Sensory   Vibration: intact  Proprioception: intact  Monofilament testing: intact    Vascular  Capillary refills: < 3 seconds  The right DP pulse is 2+. The right PT pulse is 2+. Left Foot/Ankle  Left Foot Inspection  Skin Exam: skin normal and skin intact. No dry skin, no warmth, no erythema, no maceration, normal color, no pre-ulcer, no ulcer and no callus. Toe Exam: ROM and strength within normal limits. No swelling, no tenderness, no erythema and no left toe deformity. Sensory   Vibration: intact  Proprioception: intact  Monofilament testing: intact    Vascular  Capillary refills: < 3 seconds  The left DP pulse is 2+. The left PT pulse is 2+. Assign Risk Category  No deformity present  No loss of protective sensation  No weak pulses  Risk: 0       Assessment/Plan:Diagnoses and all orders for this visit:    Type 2 diabetes mellitus without complication, without long-term current use of insulin ([facility])  Comments:  Stable with diet control  Orders:  -     Cancel: Hemoglobin A1C; Future  -     Hemoglobin A1C; Future    Essential hypertension  -     Cancel: Comprehensive metabolic panel; Future  -     Comprehensive metabolic panel; Future    Hypercholesterolemia  Comments:  Stable on statin therapy. Orders:  -     Cancel: Lipid Panel with Direct LDL reflex; Future  -     Lipid Panel with Direct LDL reflex; Future    Hypothyroidism, unspecified type  Comments:  Continue levothyroxine therapy. Orders:  -     TSH, 3rd generation with Free T4 reflex; Future  -     TSH, 3rd generation with Free T4 reflex; Future    Recurrent major depressive disorder, in full remission ([address] )  Comments:  Stable on citalopram therapy.     Stage 3a chronic kidney disease ([address])  Comments:  Continue with renal surveillance    Obesity, morbid ([address])  Comments:  Dietary restrictions discussed today        Reviewed with patient plan to treat with above plan. nn  Patient instructed to call in 72 hours if not feeling better or if symptoms worsen

## 2024-01-16 ENCOUNTER — VBI (OUTPATIENT)
Dept: ADMINISTRATIVE | Facility: OTHER | Age: 74
End: 2024-01-16

## 2024-01-30 DIAGNOSIS — N32.81 OVERACTIVE BLADDER: ICD-10-CM

## 2024-01-30 RX ORDER — SOLIFENACIN SUCCINATE 10 MG/1
10 TABLET, FILM COATED ORAL DAILY
Qty: 90 TABLET | Refills: 1 | Status: SHIPPED | OUTPATIENT
Start: 2024-01-30

## 2024-01-30 NOTE — TELEPHONE ENCOUNTER
Reason for call:   [x] Refill   [] Prior Auth  [] Other:     Office:   [x] PCP/Provider - Winnie Stanley, DO    [] Specialty/Provider -     Medication:     solifenacin (VESICARE) 10 MG tablet       Dose/Frequency: 10 mg, Oral, Daily     Quantity: 90    Pharmacy: [place] [address] [address]     Does the patient have enough for 3 days?   [] Yes   [x] No - Send as HP to POD

## 2024-03-13 ENCOUNTER — APPOINTMENT (OUTPATIENT)
Dept: LAB | Facility: CLINIC | Age: 74
End: 2024-03-13
Payer: COMMERCIAL

## 2024-03-13 DIAGNOSIS — E11.9 TYPE 2 DIABETES MELLITUS WITHOUT COMPLICATION, WITHOUT LONG-TERM CURRENT USE OF INSULIN (HCC): ICD-10-CM

## 2024-03-13 DIAGNOSIS — E78.00 HYPERCHOLESTEROLEMIA: ICD-10-CM

## 2024-03-13 DIAGNOSIS — I10 ESSENTIAL HYPERTENSION: ICD-10-CM

## 2024-03-13 DIAGNOSIS — E03.9 HYPOTHYROIDISM, UNSPECIFIED TYPE: ICD-10-CM

## 2024-03-13 LAB
ALBUMIN SERPL BCP-MCNC: 4.3 G/DL (ref 3.5–5)
ALP SERPL-CCNC: 44 U/L (ref 34–104)
ALT SERPL W P-5'-P-CCNC: 33 U/L (ref 7–52)
ANION GAP SERPL CALCULATED.3IONS-SCNC: 13 MMOL/L (ref 4–13)
AST SERPL W P-5'-P-CCNC: 25 U/L (ref 13–39)
BILIRUB SERPL-MCNC: 0.57 MG/DL (ref 0.2–1)
BUN SERPL-MCNC: 18 MG/DL (ref 5–25)
CALCIUM SERPL-MCNC: 8.9 MG/DL (ref 8.4–10.2)
CHLORIDE SERPL-SCNC: 106 MMOL/L (ref 96–108)
CHOLEST SERPL-MCNC: 130 MG/DL
CO2 SERPL-SCNC: 22 MMOL/L (ref 21–32)
CREAT SERPL-MCNC: 1.11 MG/DL (ref 0.6–1.3)
EST. AVERAGE GLUCOSE BLD GHB EST-MCNC: 148 MG/DL
GFR SERPL CREATININE-BSD FRML MDRD: 48 ML/MIN/1.73SQ M
GLUCOSE P FAST SERPL-MCNC: 174 MG/DL (ref 65–99)
HBA1C MFR BLD: 6.8 %
HDLC SERPL-MCNC: 38 MG/DL
LDLC SERPL CALC-MCNC: 41 MG/DL (ref 0–100)
POTASSIUM SERPL-SCNC: 4.3 MMOL/L (ref 3.5–5.3)
PROT SERPL-MCNC: 7 G/DL (ref 6.4–8.4)
SODIUM SERPL-SCNC: 141 MMOL/L (ref 135–147)
TRIGL SERPL-MCNC: 255 MG/DL
TSH SERPL DL<=0.05 MIU/L-ACNC: 2.66 UIU/ML (ref 0.45–4.5)

## 2024-03-13 PROCEDURE — 83036 HEMOGLOBIN GLYCOSYLATED A1C: CPT

## 2024-03-13 PROCEDURE — 84443 ASSAY THYROID STIM HORMONE: CPT

## 2024-03-13 PROCEDURE — 36415 COLL VENOUS BLD VENIPUNCTURE: CPT

## 2024-03-13 PROCEDURE — 80053 COMPREHEN METABOLIC PANEL: CPT

## 2024-03-13 PROCEDURE — 80061 LIPID PANEL: CPT

## 2024-03-15 ENCOUNTER — OFFICE VISIT (OUTPATIENT)
Dept: FAMILY MEDICINE CLINIC | Facility: CLINIC | Age: 74
End: 2024-03-15
Payer: COMMERCIAL

## 2024-03-15 VITALS
WEIGHT: 186.4 LBS | HEIGHT: 59 IN | BODY MASS INDEX: 37.58 KG/M2 | DIASTOLIC BLOOD PRESSURE: 80 MMHG | HEART RATE: 71 BPM | OXYGEN SATURATION: 96 % | SYSTOLIC BLOOD PRESSURE: 144 MMHG | TEMPERATURE: 98.4 F

## 2024-03-15 DIAGNOSIS — K21.9 GASTROESOPHAGEAL REFLUX DISEASE WITHOUT ESOPHAGITIS: ICD-10-CM

## 2024-03-15 DIAGNOSIS — N32.81 OVERACTIVE BLADDER: ICD-10-CM

## 2024-03-15 DIAGNOSIS — E03.9 HYPOTHYROIDISM, UNSPECIFIED TYPE: ICD-10-CM

## 2024-03-15 DIAGNOSIS — E78.00 HYPERCHOLESTEROLEMIA: ICD-10-CM

## 2024-03-15 DIAGNOSIS — E11.9 TYPE 2 DIABETES MELLITUS WITHOUT COMPLICATION, WITHOUT LONG-TERM CURRENT USE OF INSULIN (HCC): Primary | ICD-10-CM

## 2024-03-15 PROCEDURE — 99214 OFFICE O/P EST MOD 30 MIN: CPT | Performed by: FAMILY MEDICINE

## 2024-03-15 PROCEDURE — G2211 COMPLEX E/M VISIT ADD ON: HCPCS | Performed by: FAMILY MEDICINE

## 2024-03-15 RX ORDER — PANTOPRAZOLE SODIUM 40 MG/1
40 TABLET, DELAYED RELEASE ORAL
Qty: 30 TABLET | Refills: 5 | Status: SHIPPED | OUTPATIENT
Start: 2024-03-15 | End: 2024-09-11

## 2024-03-21 NOTE — PROGRESS NOTES
Patient ID: Maryann Sampson is a 73 y.o. female.    HPI: 73 y.o.female presents for follow up of niddm, hypertension , hypothyroidism, GERD, overactive bladder and hypercholesterolemia.  Hgba1c is  6.8   and patient's issues are well controlled. Patient deneis any dyspnea, chest pain or palpitations.  The patient again refuses a mammogram or DEXA scan    SUBJECTIVE    Family History   Problem Relation Age of Onset    Depression Maternal Uncle     Diabetes Maternal Uncle     Mental illness Maternal Uncle         Killed himself was 26    Completed Suicide  Maternal Uncle         Had infantile diabetes    Glaucoma Sister      Social History     Socioeconomic History    Marital status: Single     Spouse name: Not on file    Number of children: Not on file    Years of education: completed 12th grade    Highest education level: Not on file   Occupational History    Not on file   Tobacco Use    Smoking status: Never     Passive exposure: Past    Smokeless tobacco: Never   Vaping Use    Vaping status: Never Used   Substance and Sexual Activity    Alcohol use: Yes     Comment: Occasionally with meal    Drug use: Never    Sexual activity: Not Currently     Birth control/protection: Abstinence   Other Topics Concern    Not on file   Social History Narrative    Daily coffee consumption: ____cp/day    Daily cola consumption: ___ can/day    daily tea consumption: _____cp/day     Social Determinants of Health     Financial Resource Strain: Low Risk  (12/1/2022)    Overall Financial Resource Strain (CARDIA)     Difficulty of Paying Living Expenses: Not very hard   Food Insecurity: Not on file   Transportation Needs: No Transportation Needs (12/1/2022)    PRAPARE - Transportation     Lack of Transportation (Medical): No     Lack of Transportation (Non-Medical): No   Physical Activity: Not on file   Stress: Not on file   Social Connections: Not on file   Intimate Partner Violence: Not on file   Housing Stability: Not on file     Past  Medical History:   Diagnosis Date    Anxiety     Depression     Disease of thyroid gland     Hyperlipidemia     Stroke (HCC)     Visual impairment      Past Surgical History:   Procedure Laterality Date    EYE SURGERY      cosmetic     Allergies   Allergen Reactions    Statins      myalgia       Current Outpatient Medications:     ALPRAZolam (XANAX) 0.25 mg tablet, Take 1 tablet (0.25 mg total) by mouth 3 (three) times a day as needed for anxiety, Disp: 90 tablet, Rfl: 0    cholecalciferol (VITAMIN D3) 1,000 units tablet, Take 1,000 Units by mouth daily, Disp: , Rfl:     citalopram (CeleXA) 20 mg tablet, TAKE 1 & 1/2 (ONE & ONE-HALF) TABLETS BY MOUTH ONCE DAILY, Disp: 135 tablet, Rfl: 1    Cyanocobalamin (Vitamin B 12) 500 MCG TABS, Take 1,000 mg by mouth daily, Disp: , Rfl:     levothyroxine 25 mcg tablet, Take 1 tablet (25 mcg total) by mouth daily, Disp: 90 tablet, Rfl: 1    pantoprazole (PROTONIX) 40 mg tablet, Take 1 tablet (40 mg total) by mouth daily before breakfast, Disp: 30 tablet, Rfl: 5    rosuvastatin (CRESTOR) 10 MG tablet, Take 1 tablet (10 mg total) by mouth daily, Disp: 90 tablet, Rfl: 1    solifenacin (VESICARE) 10 MG tablet, Take 1 tablet (10 mg total) by mouth daily, Disp: 90 tablet, Rfl: 1    Review of Systems  Constitutional:     Denies fever, chills ,fatigue ,weakness ,weight loss, weight gain     ENT: Denies earache ,loss of hearing ,nosebleed, nasal discharge,nasal congestion ,sore throat ,hoarseness  Pulmonary: Denies shortness of breath ,cough  ,dyspnea on exertion, orthopnea  ,PND   Cardiovascular:  Denies bradycardia , tachycardia  ,palpations, lower extremity edema leg, claudication  Breast:  Denies new or changing breast lumps ,nipple discharge ,nipple changes  Abdomen:  Denies abdominal pain , anorexia , indigestion, nausea, vomiting, constipation, diarrhea  Musculoskeletal: Denies myalgias, arthralgias, joint swelling, joint stiffness , limb pain, limb swelling  Gu: denies dysuria,  "polyuria  Skin: Denies skin rash, skin lesion, skin wound, itching, dry skin  Neuro: Denies headache, numbness, tingling, confusion, loss of consciousness, dizziness, vertigo  Psychiatric: Denies feelings of depression, suicidal ideation, anxiety, sleep disturbances    OBJECTIVE  /80   Pulse 71   Temp 98.4 °F (36.9 °C)   Ht 4' 11\" (1.499 m)   Wt 84.6 kg (186 lb 6.4 oz)   SpO2 96%   BMI 37.65 kg/m²   Constitutional:   NAD, well appearing and well nourished      ENT:   Conjunctiva and lids: no injection, edema, or discharge     Pupils and iris: HENRY bilaterally    External inspection of ears and nose: normal without deformities or discharge.      Otoscopic exam: Canals patent without erythema.       Nasal mucosa, septum and turbinates: Normal or edema or discharge         Oropharynx:  Moist mucosa, normal tongue and tonsils without lesions. No erythema        Pulmonary:Respiratory effort normal rate and rhythm, no increased work of breathing. Auscultation of lungs:  Clear bilaterally with no adventitious breath sounds       Cardiovascular: regular rate and rhythm, S1 and S2, no murmur, no edema and/or varicosities of LE      Abdomen: Soft and non-distended     Positive bowel sounds      No heptomegaly or splenomegaly      Gu: no suprapubic tenderness or CVA tenderness, no urethral discharge  Lymphatic:  No anterior or posterior cervical lymphadenopathy         Musculoskeletal:  Gait and station: Normal gait      Digits and nails normal without clubbing or cyanosis       Inspection/palpation of joints, bones, and muscles:  No joint tenderness, swelling, full active and passive range of motion       Skin: Normal skin turgor and no rashes      Neuro:    Normal reflexes     Psych:   alert and oriented to person, place and time     normal mood and affect       Assessment/Plan:Diagnoses and all orders for this visit:    Type 2 diabetes mellitus without complication, without long-term current use of insulin " (HCC)  Comments:  Continue with diet control  Orders:  -     Comprehensive metabolic panel; Future  -     Albumin / creatinine urine ratio; Future  -     Hemoglobin A1C; Future    Hypercholesterolemia  Comments:  Stable on statin therapy  Orders:  -     Lipid Panel with Direct LDL reflex; Future    Hypothyroidism, unspecified type  Comments:  Continue levothyroxine therapy  Orders:  -     TSH, 3rd generation with Free T4 reflex; Future    Gastroesophageal reflux disease without esophagitis  Comments:  Continue PPI therapy  Orders:  -     pantoprazole (PROTONIX) 40 mg tablet; Take 1 tablet (40 mg total) by mouth daily before breakfast    Overactive bladder  Comments:  Stable on Vesicare therapy        Reviewed with patient plan to treat with above plan.    Patient instructed to call in 72 hours if not feeling better or if symptoms worsen

## 2024-04-06 DIAGNOSIS — E78.00 HYPERCHOLESTEROLEMIA: ICD-10-CM

## 2024-04-06 DIAGNOSIS — E03.9 HYPOTHYROIDISM, UNSPECIFIED TYPE: ICD-10-CM

## 2024-04-07 RX ORDER — LEVOTHYROXINE SODIUM 0.03 MG/1
25 TABLET ORAL DAILY
Qty: 90 TABLET | Refills: 1 | Status: SHIPPED | OUTPATIENT
Start: 2024-04-07

## 2024-04-07 RX ORDER — ROSUVASTATIN CALCIUM 10 MG/1
10 TABLET, COATED ORAL DAILY
Qty: 90 TABLET | Refills: 1 | Status: SHIPPED | OUTPATIENT
Start: 2024-04-07

## 2024-05-04 DIAGNOSIS — F33.42 RECURRENT MAJOR DEPRESSIVE DISORDER, IN FULL REMISSION (HCC): ICD-10-CM

## 2024-05-05 RX ORDER — CITALOPRAM 20 MG/1
TABLET ORAL
Qty: 135 TABLET | Refills: 0 | Status: SHIPPED | OUTPATIENT
Start: 2024-05-05

## 2024-08-12 DIAGNOSIS — F33.42 RECURRENT MAJOR DEPRESSIVE DISORDER, IN FULL REMISSION (HCC): ICD-10-CM

## 2024-08-12 DIAGNOSIS — N32.81 OVERACTIVE BLADDER: ICD-10-CM

## 2024-08-12 RX ORDER — SOLIFENACIN SUCCINATE 10 MG/1
10 TABLET, FILM COATED ORAL DAILY
Qty: 90 TABLET | Refills: 1 | Status: SHIPPED | OUTPATIENT
Start: 2024-08-12

## 2024-08-12 RX ORDER — CITALOPRAM HYDROBROMIDE 20 MG/1
TABLET ORAL
Qty: 135 TABLET | Refills: 1 | Status: SHIPPED | OUTPATIENT
Start: 2024-08-12

## 2024-08-17 ENCOUNTER — HOSPITAL ENCOUNTER (EMERGENCY)
Facility: HOSPITAL | Age: 74
Discharge: HOME | End: 2024-08-17
Attending: EMERGENCY MEDICINE
Payer: COMMERCIAL

## 2024-08-17 ENCOUNTER — APPOINTMENT (EMERGENCY)
Dept: CT IMAGING | Facility: HOSPITAL | Age: 74
End: 2024-08-17
Payer: COMMERCIAL

## 2024-08-17 VITALS
BODY MASS INDEX: 37.65 KG/M2 | HEIGHT: 59 IN | RESPIRATION RATE: 18 BRPM | TEMPERATURE: 99.8 F | OXYGEN SATURATION: 94 % | DIASTOLIC BLOOD PRESSURE: 72 MMHG | HEART RATE: 66 BPM | SYSTOLIC BLOOD PRESSURE: 183 MMHG

## 2024-08-17 DIAGNOSIS — S09.90XA CLOSED HEAD INJURY, INITIAL ENCOUNTER: ICD-10-CM
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
## 2024-08-17 DIAGNOSIS — S00.01XA ABRASION OF SCALP, INITIAL ENCOUNTER: ICD-10-CM

## 2024-08-17 PROCEDURE — 99284 EMERGENCY DEPT VISIT MOD MDM: CPT

## 2024-08-17 PROCEDURE — 70450 CT HEAD/BRAIN W/O DYE: CPT

## 2024-08-17 PROCEDURE — 72125 CT NECK SPINE W/O DYE: CPT

## 2024-08-18 NOTE — ED ATTENDING ATTESTATION
8/17/2024  IMadeleine MD, saw and evaluated the patient. I have discussed the patient with the resident/non-physician practitioner and agree with the resident's/non-physician practitioner's findings, Plan of Care, and MDM as documented in the resident's/non-physician practitioner's note, except where noted. All available labs and Radiology studies were reviewed.  I was present for key portions of any procedure(s) performed by the resident/non-physician practitioner and I was immediately available to provide assistance.       At this point I agree with the current assessment done in the Emergency Department.  I have conducted an independent evaluation of this patient a history and physical is as follows:    ED Course  ED Course as of 08/18/24 0048   Sat Aug 17, 2024   2240 73 F s/p mechanical fall in slippers.  Her past medical history is significant for hyperlipidemia, CVA, thyroid problem.  Patient reports that she slipped and fell, hitting the back of her head on the ground.  She did not lose consciousness and was able to get up.  Denies headache, visual changes, nausea, vomiting.  Patient initially had some bleeding from the back of her head, but bleeding stopped.  Did not occurred in the morning and patient did not feel that she needed to be evaluated, however when a friend of hers came over later during the day, the encouraged her to the evaluated.  On arrival to the ED, patient has no complaints and has no bleeding.  Feels well.    Her vital signs on arrival were remarkable for hypertension.  Her physical exam was remarkable for a scalp abrasion on her occiput not actively bleeding, no laceration.  No neurologic findings or focal neurologic deficits.  No other traumatic findings.    Clinical presentation puts patient at risk for the following differential diagnoses:    Skull fracture, intracranial bleed, C-spine fracture/dislocation.  Imaging was ordered.    Analgesia declined.  C-collar placed.   2324 CT  spine cervical without contrast  No cervical spine fracture or traumatic malalignment.     Incidental thyroid nodule(s) for which nonemergent thyroid ultrasound is recommended.     2324 CT head wo contrast  No acute intracranial abnormality.   2352 Upon re-assessment, patient feels improved.  Patient remained hemodynamically and clinically stable in the ED.  Strict return precautions given.  Patient verbalized understanding and will follow up as outpatient with PMD.  Upon discharge, patient was AO4, GCS15, and fully ambulatory.             Critical Care Time  Procedures

## 2024-08-18 NOTE — ED PROVIDER NOTES
History  Chief Complaint   Patient presents with    Fall     Pt arrived via ems from home for a slip and fall around noon today. (+) head strike, no LOC, no thinners. Denies dizziness or headache. Abrasion to head, no active bleeding.     73-year-old female with past medical history of subdural bleed presenting to the ED with a complaint of head pain after a fall.  Patient states that at noon today she had been walking in her apartment when she caught her slippers on the floor and fell to the ground hitting the back of her head on the ground.  Patient did not lose consciousness and was able to get herself up immediately from the floor.  Patient did state that she had an abrasion on the back of her head and was bleeding from but was able to stop the bleeding herself.  Patient at the time had no concern for coming to the hospital and had gone through her day as normal.  Patient states that a friend of hers had come over later in the day and when talking to her friend about her fall, the friend had suggested she come to the hospital.  911 was called and patient was transported by EMS.  Patient is complaining of pain at the site of the hematoma on the back of her head otherwise is not complaining of neck pain, headache, dizziness, blurred vision.  Patient assures that the fall was mechanical in nature and she was feeling her normal self prior to the fall.  Patient has had no nausea or vomiting.  Patient does use a cane at baseline due to a vestibular issue that is well-known to her and her primary care provider.        Prior to Admission Medications   Prescriptions Last Dose Informant Patient Reported? Taking?   ALPRAZolam (XANAX) 0.25 mg tablet  Self No No   Sig: Take 1 tablet (0.25 mg total) by mouth 3 (three) times a day as needed for anxiety   Cyanocobalamin (Vitamin B 12) 500 MCG TABS  Self Yes No   Sig: Take 1,000 mg by mouth daily   cholecalciferol (VITAMIN D3) 1,000 units tablet  Self Yes No   Sig: Take 1,000  Units by mouth daily   citalopram (CeleXA) 20 mg tablet   No No   Sig: TAKE 1 AND 1/2 TABLETS BY MOUTH ONCE DAILY   levothyroxine 25 mcg tablet   No No   Sig: Take 1 tablet by mouth once daily   pantoprazole (PROTONIX) 40 mg tablet   No No   Sig: Take 1 tablet (40 mg total) by mouth daily before breakfast   rosuvastatin (CRESTOR) 10 MG tablet   No No   Sig: Take 1 tablet by mouth once daily   solifenacin (VESICARE) 10 MG tablet   No No   Sig: Take 1 tablet (10 mg total) by mouth daily      Facility-Administered Medications: None       Past Medical History:   Diagnosis Date    Anxiety     Depression     Disease of thyroid gland     Hyperlipidemia     Stroke (HCC)     Visual impairment        Past Surgical History:   Procedure Laterality Date    EYE SURGERY      cosmetic       Family History   Problem Relation Age of Onset    Depression Maternal Uncle     Diabetes Maternal Uncle     Mental illness Maternal Uncle         Killed himself was 28    Completed Suicide  Maternal Uncle         Had infantile diabetes    Glaucoma Sister      I have reviewed and agree with the history as documented.    E-Cigarette/Vaping    E-Cigarette Use Never User      E-Cigarette/Vaping Substances     Social History     Tobacco Use    Smoking status: Never     Passive exposure: Past    Smokeless tobacco: Never   Vaping Use    Vaping status: Never Used   Substance Use Topics    Alcohol use: Yes     Comment: Occasionally with meal    Drug use: Never        Review of Systems   Constitutional:  Negative for chills and fever.   HENT:  Negative for hearing loss.    Eyes:  Negative for visual disturbance.   Respiratory:  Negative for chest tightness and shortness of breath.    Cardiovascular:  Negative for palpitations.   Gastrointestinal:  Negative for abdominal pain, diarrhea, nausea and vomiting.   Musculoskeletal:  Negative for back pain, neck pain and neck stiffness.   Neurological:  Negative for dizziness, tremors, seizures, syncope, facial  asymmetry, speech difficulty, weakness, light-headedness, numbness and headaches.   Psychiatric/Behavioral:  Negative for agitation and confusion.        Physical Exam  ED Triage Vitals   Temperature Pulse Respirations Blood Pressure SpO2   08/17/24 2214 08/17/24 2214 08/17/24 2214 08/17/24 2216 08/17/24 2214   99.8 °F (37.7 °C) 64 18 (!) 219/90 96 %      Temp Source Heart Rate Source Patient Position - Orthostatic VS BP Location FiO2 (%)   08/17/24 2214 -- 08/17/24 2214 08/17/24 2214 --   Oral  Sitting Right arm       Pain Score       08/17/24 2216       4             Orthostatic Vital Signs  Vitals:    08/17/24 2214 08/17/24 2216 08/17/24 2300   BP:  (!) 219/90 (!) 183/72   Pulse: 64  66   Patient Position - Orthostatic VS: Sitting Lying        Physical Exam  Constitutional:       Appearance: Normal appearance.   HENT:      Head: Normocephalic. Abrasion present. No raccoon eyes, Ziegler's sign, masses or laceration. Hair is normal.      Jaw: No trismus or tenderness.        Comments: Abrasion to the posterior scalp.  No active bleeding.  No signs of skull fracture.     Right Ear: Tympanic membrane, ear canal and external ear normal.      Left Ear: Tympanic membrane, ear canal and external ear normal.      Nose: Nose normal.      Mouth/Throat:      Pharynx: Oropharynx is clear.   Eyes:      Extraocular Movements: Extraocular movements intact.      Pupils: Pupils are equal, round, and reactive to light.   Cardiovascular:      Rate and Rhythm: Normal rate and regular rhythm.      Pulses: Normal pulses.      Heart sounds: Normal heart sounds.   Pulmonary:      Effort: Pulmonary effort is normal.      Breath sounds: Normal breath sounds.   Abdominal:      General: Bowel sounds are normal.      Palpations: Abdomen is soft.   Musculoskeletal:         General: Normal range of motion.      Cervical back: Normal range of motion.   Skin:     General: Skin is warm.      Capillary Refill: Capillary refill takes less than 2  seconds.   Neurological:      General: No focal deficit present.      Mental Status: She is alert and oriented to person, place, and time.      GCS: GCS eye subscore is 4. GCS verbal subscore is 5. GCS motor subscore is 6.      Cranial Nerves: Cranial nerves 2-12 are intact.      Sensory: Sensation is intact.      Motor: Motor function is intact.      Coordination: Coordination is intact.      Gait: Gait is intact.   Psychiatric:         Mood and Affect: Mood normal.         Behavior: Behavior normal.         ED Medications  Medications - No data to display    Diagnostic Studies  Results Reviewed       None                   CT head wo contrast   Final Result by Gina Carter MD (08/17 2313)      No acute intracranial abnormality.                  Workstation performed: [id number]         CT spine cervical without contrast   Final Result by Gina Carter MD (08/17 2315)      No cervical spine fracture or traumatic malalignment.      Incidental thyroid nodule(s) for which nonemergent thyroid ultrasound is recommended.            Workstation performed: [id number]               Procedures  Procedures      ED Course  ED Course as of 08/18/24 0125   Sat Aug 17, 2024   2331 IMPRESSION:     No cervical spine fracture or traumatic malalignment.     Incidental thyroid nodule(s) for which nonemergent thyroid ultrasound is recommended.           Workstation performed: [id number]     2331 IMPRESSION:     No acute intracranial abnormality.                 Workstation performed: [id number]                               SBIRT 20yo+      Flowsheet Row Most Recent Value   Initial Alcohol Screen: US AUDIT-C     1. How often do you have a drink containing alcohol? 0 Filed at: 08/17/2024 2222   2. How many drinks containing alcohol do you have on a typical day you are drinking?  0 Filed at: 08/17/2024 2222   3a. Male UNDER 65: How often do you have five or more drinks on one occasion? 0 Filed at: 08/17/2024 2222   3b. FEMALE Any  Age, or MALE 65+: How often do you have 4 or more drinks on one occassion? 0 Filed at: 08/17/2024 2222   Audit-C Score 0 Filed at: 08/17/2024 2222   MARY: How many times in the past year have you...    Used an illegal drug or used a prescription medication for non-medical reasons? Never Filed at: 08/17/2024 2222                  Medical Decision Making  73-year-old female presenting to the ED with an abrasion to the posterior scalp after a fall.    At this time considering head injuries including skull fracture, subdural, subarachnoid, skull fracture, abrasion, laceration    CT of the head and cervical spine was completed.  Patient was placed in a c-collar by nursing staff on arrival.  Patient has no complaints of headaches or pain at this time.    CT scan showed no acute cervical spine fracture or traumatic malalignment.  CT also showed no intracranial abnormality.  CT was significant for an incidental thyroid nodule which patient will be called and followed up on.    Blood pressure noted to be elevated on initial presentation.  Patient has no symptoms of high blood pressure.  Blood pressure and monitored and noted to improve before discharge at 183/72.    Following CT scans, patient feeling at baseline.  Patient's sister and friend are at bedside who state that patient is at baseline.  Patient was ambulated out of bed and walked around.  No abnormal gait.  Patient felt normal self.  Patient comfortable with discharge and follow-up outpatient.  Patient discharged.    Amount and/or Complexity of Data Reviewed  Radiology: ordered.          Disposition  Final diagnoses:   Fall, initial encounter   Closed head injury, initial encounter   Abrasion of scalp, initial encounter     Time reflects when diagnosis was documented in both MDM as applicable and the Disposition within this note       Time User Action Codes Description Comment    8/17/2024 11:30 PM Madeleine Alcantar Add [W19.XXXA] Fall, initial encounter     8/17/2024  11:30 PM Madeleine Alcantar [S09.90XA] Closed head injury, initial encounter     8/17/2024 11:30 PM Madeleine Alcantar [S00.01XA] Abrasion of scalp, initial encounter           ED Disposition       ED Disposition   Discharge    Condition   Stable    Date/Time   Sat Aug 17, 2024 7223    Comment   Maryann Sampson discharge to home/self care.                   Follow-up Information    None         Discharge Medication List as of 8/17/2024 11:32 PM        CONTINUE these medications which have NOT CHANGED    Details   ALPRAZolam (XANAX) 0.25 mg tablet Take 1 tablet (0.25 mg total) by mouth 3 (three) times a day as needed for anxiety, Starting Tue 4/13/2021, Normal      cholecalciferol (VITAMIN D3) 1,000 units tablet Take 1,000 Units by mouth daily, Historical Med      citalopram (CeleXA) 20 mg tablet TAKE 1 AND 1/2 TABLETS BY MOUTH ONCE DAILY, Normal      Cyanocobalamin (Vitamin B 12) 500 MCG TABS Take 1,000 mg by mouth daily, Historical Med      levothyroxine 25 mcg tablet Take 1 tablet by mouth once daily, Starting Sun 4/7/2024, Normal      pantoprazole (PROTONIX) 40 mg tablet Take 1 tablet (40 mg total) by mouth daily before breakfast, Starting Fri 3/15/2024, Until Wed 9/11/2024, Normal      rosuvastatin (CRESTOR) 10 MG tablet Take 1 tablet by mouth once daily, Starting Sun 4/7/2024, Normal      solifenacin (VESICARE) 10 MG tablet Take 1 tablet (10 mg total) by mouth daily, Starting Mon 8/12/2024, Normal           No discharge procedures on file.    PDMP Review         Value Time User    PDMP Reviewed  Yes 4/13/2021  2:39 PM Winnie Stanley DO             ED Provider  Attending physically available and evaluated Maryann Sampson. I managed the patient along with the ED Attending.    Electronically Signed by           William Bains MD  08/18/24 0124       William Bains MD  08/18/24 0125

## 2024-08-19 ENCOUNTER — VBI (OUTPATIENT)
Dept: FAMILY MEDICINE CLINIC | Facility: CLINIC | Age: 74
End: 2024-08-19

## 2024-08-19 NOTE — TELEPHONE ENCOUNTER
08/19/24 1:24 PM    Patient contacted post ED visit, [facility] department spoke with patient/caregiver and outreach was successful.    Thank you.  Dianna Rodriguez MA  [facility]

## 2024-08-19 NOTE — TELEPHONE ENCOUNTER
08/19/24 11:41 AM    Patient contacted post ED visit, first outreach attempt made. Message was left for patient to return a call to the [facility] Department at [facility]: Phone [phone].    Thank you.  Dianna Rodriguez MA  [facility]

## 2024-08-20 ENCOUNTER — TELEPHONE (OUTPATIENT)
Dept: FAMILY MEDICINE CLINIC | Facility: CLINIC | Age: 74
End: 2024-08-20

## 2024-08-20 DIAGNOSIS — E04.1 THYROID NODULE: Primary | ICD-10-CM

## 2024-08-20 NOTE — TELEPHONE ENCOUNTER
Patient is aware, provided number for CS, she will call to set up US thyroid appt. YOVANI   Pulmonary Progress Note      NAME: Tone Rosales - ROOM: 237/749- - MRN: [id number] - Age: 78year old - : 1926    Assessment/Plan:  1.  Acute on chronic hypercapnic respiratory failure  - Suspect progressive AMS/dementia/strokes are contri 10/02/21  1232   RBC 3.45*   < > 3.08*   HGB 9.3*   < > 8.2*   HCT 31.8*   < > 28.6*   MCV 92.2   < > 92.9   MCH 27.0   < > 26.6   MCHC 29.2*   < > 28.7*   RDW 15.7   < > 16.0   NEPRELIM 9.52*  --   --    WBC 10.5   < > 6.1   .0   < > 234.0    < > =

## 2024-08-29 ENCOUNTER — APPOINTMENT (OUTPATIENT)
Dept: LAB | Facility: CLINIC | Age: 74
End: 2024-08-29
Payer: COMMERCIAL

## 2024-08-29 DIAGNOSIS — E78.00 HYPERCHOLESTEROLEMIA: ICD-10-CM

## 2024-08-29 DIAGNOSIS — E11.9 TYPE 2 DIABETES MELLITUS WITHOUT COMPLICATION, WITHOUT LONG-TERM CURRENT USE OF INSULIN (HCC): ICD-10-CM

## 2024-08-29 DIAGNOSIS — E03.9 HYPOTHYROIDISM, UNSPECIFIED TYPE: ICD-10-CM

## 2024-08-29 LAB
ALBUMIN SERPL BCG-MCNC: 4.1 G/DL (ref 3.5–5)
ALP SERPL-CCNC: 55 U/L (ref 34–104)
ALT SERPL W P-5'-P-CCNC: 60 U/L (ref 7–52)
ANION GAP SERPL CALCULATED.3IONS-SCNC: 11 MMOL/L (ref 4–13)
AST SERPL W P-5'-P-CCNC: 38 U/L (ref 13–39)
BILIRUB SERPL-MCNC: 0.61 MG/DL (ref 0.2–1)
BUN SERPL-MCNC: 16 MG/DL (ref 5–25)
CALCIUM SERPL-MCNC: 9.2 MG/DL (ref 8.4–10.2)
CHLORIDE SERPL-SCNC: 104 MMOL/L (ref 96–108)
CHOLEST SERPL-MCNC: 128 MG/DL
CO2 SERPL-SCNC: 26 MMOL/L (ref 21–32)
CREAT SERPL-MCNC: 1.06 MG/DL (ref 0.6–1.3)
EST. AVERAGE GLUCOSE BLD GHB EST-MCNC: 137 MG/DL
GFR SERPL CREATININE-BSD FRML MDRD: 51 ML/MIN/1.73SQ M
GLUCOSE P FAST SERPL-MCNC: 164 MG/DL (ref 65–99)
HBA1C MFR BLD: 6.4 %
HDLC SERPL-MCNC: 33 MG/DL
LDLC SERPL CALC-MCNC: 47 MG/DL (ref 0–100)
POTASSIUM SERPL-SCNC: 4.4 MMOL/L (ref 3.5–5.3)
PROT SERPL-MCNC: 6.9 G/DL (ref 6.4–8.4)
SODIUM SERPL-SCNC: 141 MMOL/L (ref 135–147)
TRIGL SERPL-MCNC: 242 MG/DL
TSH SERPL DL<=0.05 MIU/L-ACNC: 1.52 UIU/ML (ref 0.45–4.5)

## 2024-08-29 PROCEDURE — 83036 HEMOGLOBIN GLYCOSYLATED A1C: CPT

## 2024-08-29 PROCEDURE — 36415 COLL VENOUS BLD VENIPUNCTURE: CPT

## 2024-08-29 PROCEDURE — 80061 LIPID PANEL: CPT

## 2024-08-29 PROCEDURE — 84443 ASSAY THYROID STIM HORMONE: CPT

## 2024-08-29 PROCEDURE — 80053 COMPREHEN METABOLIC PANEL: CPT

## 2024-08-30 ENCOUNTER — APPOINTMENT (OUTPATIENT)
Dept: LAB | Facility: CLINIC | Age: 74
End: 2024-08-30
Payer: COMMERCIAL

## 2024-08-30 LAB
CREAT UR-MCNC: 195.1 MG/DL
MICROALBUMIN UR-MCNC: 8.5 MG/L
MICROALBUMIN/CREAT 24H UR: 4 MG/G CREATININE (ref 0–30)

## 2024-08-30 PROCEDURE — 82043 UR ALBUMIN QUANTITATIVE: CPT

## 2024-08-30 PROCEDURE — 82570 ASSAY OF URINE CREATININE: CPT

## 2024-09-07 DIAGNOSIS — K21.9 GASTROESOPHAGEAL REFLUX DISEASE WITHOUT ESOPHAGITIS: ICD-10-CM

## 2024-09-07 RX ORDER — PANTOPRAZOLE SODIUM 40 MG/1
TABLET, DELAYED RELEASE ORAL
Qty: 30 TABLET | Refills: 5 | Status: SHIPPED | OUTPATIENT
Start: 2024-09-07

## 2024-09-10 ENCOUNTER — TELEPHONE (OUTPATIENT)
Age: 74
End: 2024-09-10

## 2024-09-10 NOTE — TELEPHONE ENCOUNTER
Patient called requesting refill for Pantoprazole. Patient made aware medication was refilled on 9/7/24 for 30 with 5 refills to [facility] pharmacy. Patient instructed to contact the pharmacy to obtain refills of medication. Patient verbalized understanding.

## 2024-09-18 ENCOUNTER — OFFICE VISIT (OUTPATIENT)
Dept: FAMILY MEDICINE CLINIC | Facility: CLINIC | Age: 74
End: 2024-09-18

## 2024-09-18 VITALS
SYSTOLIC BLOOD PRESSURE: 112 MMHG | WEIGHT: 178.4 LBS | HEIGHT: 59 IN | BODY MASS INDEX: 35.96 KG/M2 | TEMPERATURE: 97.8 F | DIASTOLIC BLOOD PRESSURE: 80 MMHG | OXYGEN SATURATION: 97 % | HEART RATE: 59 BPM

## 2024-09-18 DIAGNOSIS — Z12.31 BREAST CANCER SCREENING BY MAMMOGRAM: ICD-10-CM

## 2024-09-18 DIAGNOSIS — Z78.0 ASYMPTOMATIC POSTMENOPAUSAL ESTROGEN DEFICIENCY: ICD-10-CM

## 2024-09-18 DIAGNOSIS — E78.2 MIXED HYPERLIPIDEMIA: ICD-10-CM

## 2024-09-18 DIAGNOSIS — Z00.00 MEDICARE ANNUAL WELLNESS VISIT, SUBSEQUENT: Primary | ICD-10-CM

## 2024-09-18 DIAGNOSIS — F41.9 ANXIETY: ICD-10-CM

## 2024-09-18 DIAGNOSIS — F33.42 RECURRENT MAJOR DEPRESSIVE DISORDER, IN FULL REMISSION (HCC): ICD-10-CM

## 2024-09-18 DIAGNOSIS — Z23 ENCOUNTER FOR IMMUNIZATION: ICD-10-CM

## 2024-09-18 DIAGNOSIS — E11.22 TYPE 2 DIABETES MELLITUS WITH CHRONIC KIDNEY DISEASE, WITHOUT LONG-TERM CURRENT USE OF INSULIN, UNSPECIFIED CKD STAGE (HCC): ICD-10-CM

## 2024-09-18 DIAGNOSIS — E78.00 HYPERCHOLESTEROLEMIA: ICD-10-CM

## 2024-09-18 DIAGNOSIS — N18.31 STAGE 3A CHRONIC KIDNEY DISEASE (HCC): ICD-10-CM

## 2024-09-18 DIAGNOSIS — E03.8 OTHER SPECIFIED HYPOTHYROIDISM: ICD-10-CM

## 2024-09-18 DIAGNOSIS — E66.01 OBESITY, MORBID (HCC): ICD-10-CM

## 2024-09-18 RX ORDER — ALPRAZOLAM 0.25 MG
0.25 TABLET ORAL 3 TIMES DAILY PRN
Qty: 90 TABLET | Refills: 0 | Status: SHIPPED | OUTPATIENT
Start: 2024-09-18

## 2024-09-18 NOTE — PROGRESS NOTES
Depression Screening Follow-up Plan: Patient's depression screening was positive with a PHQ-2 score of . Their PHQ-9 score was 11. Patient with underlying depression and was advised to continue current medications as prescribed.Ambulatory Visit  Name: Maryann Sampson      : 1950      MRN: [id number]  Encounter Provider: Winnie Stanley DO  Encounter Date: 2024   Encounter department: [facility]    Assessment & Plan       Preventive health issues were discussed with patient, and age appropriate screening tests were ordered as noted in patient's After Visit Summary. Personalized health advice and appropriate referrals for health education or preventive services given if needed, as noted in patient's After Visit Summary.    History of Present Illness     HPI   Patient Care Team:  Winnie Stanley DO as PCP - General  Winnie Stanley DO as PCP - PCP-[facility] ([facility])  Alma Michel MD    Review of Systems   Constitutional:  Negative for appetite change, chills and fever.   HENT:  Negative for ear pain, facial swelling, rhinorrhea, sinus pain, sore throat and trouble swallowing.    Eyes:  Negative for discharge and redness.   Respiratory:  Negative for chest tightness, shortness of breath and wheezing.    Cardiovascular:  Negative for chest pain and palpitations.   Gastrointestinal:  Negative for abdominal pain, diarrhea, nausea and vomiting.   Endocrine: Negative for polyuria.   Genitourinary:  Negative for dysuria and urgency.   Musculoskeletal:  Negative for arthralgias and back pain.   Skin:  Negative for rash.   Neurological:  Negative for dizziness, weakness and headaches.   Hematological:  Negative for adenopathy.   Psychiatric/Behavioral:  Negative for behavioral problems, confusion and sleep disturbance.    All other systems reviewed and are negative.    Medical History Reviewed by provider this encounter:       Annual Wellness Visit  Questionnaire   Maryann is here for her Subsequent Wellness visit. Last Medicare Wellness visit information reviewed, patient interviewed, no change since last AWV.     Health Risk Assessment:   Patient rates overall health as fair. Patient feels that their physical health rating is same. Patient is satisfied with their life. Eyesight was rated as slightly worse. Hearing was rated as same. Patient feels that their emotional and mental health rating is same. Patients states they are never, rarely angry. Patient states they are sometimes unusually tired/fatigued. Pain experienced in the last 7 days has been none. Patient states that she has experienced no weight loss or gain in last 6 months.     Depression Screening:   PHQ-9 Score: 11      Fall Risk Screening:   In the past year, patient has experienced: history of falling in past year      Urinary Incontinence Screening:   Patient has leaked urine accidently in the last six months.     Home Safety:  Patient has trouble with stairs inside or outside of their home. Patient has working smoke alarms and has working carbon monoxide detector. Home safety hazards include: medications that cause fatigue.     Nutrition:   Current diet is Regular.     Medications:   Patient is currently taking over-the-counter supplements. OTC medications include: see medication list. Patient is able to manage medications.     Activities of Daily Living (ADLs)/Instrumental Activities of Daily Living (IADLs):   Walk and transfer into and out of bed and chair?: Yes  Dress and groom yourself?: Yes    Bathe or shower yourself?: Yes    Feed yourself? Yes  Do your laundry/housekeeping?: Yes  Manage your money, pay your bills and track your expenses?: Yes  Make your own meals?: Yes    Do your own shopping?: Yes    Previous Hospitalizations:   Any hospitalizations or ED visits within the last 12 months?: Yes    How many hospitalizations have you had in the last year?: 1-2    Advance Care Planning:    Living will: No    Durable POA for healthcare: Yes    Advanced directive: No    Advanced directive counseling given: Yes    ACP document given: Yes    Patient declined ACP directive: No    End of Life Decisions reviewed with patient: Yes    Provider agrees with end of life decisions: Yes      Cognitive Screening:   Provider or family/friend/caregiver concerned regarding cognition?: No    PREVENTIVE SCREENINGS      Cardiovascular Screening:    General: Screening Not Indicated and History Lipid Disorder      Diabetes Screening:     General: Screening Not Indicated and History Diabetes      Colorectal Cancer Screening:     General: Screening Current      Prostate Cancer Screening:    General: Screening Current      Breast Cancer Screening:     General: Risks and Benefits Discussed    Due for: Mammogram        Cervical Cancer Screening:    General: Screening Not Indicated      Osteoporosis Screening:    General: Risks and Benefits Discussed    Due for: DXA Axial      Abdominal Aortic Aneurysm (AAA) Screening:        General: Screening Current and Screening Not Indicated      Lung Cancer Screening:     General: Screening Not Indicated      Hepatitis C Screening:    General: Screening Current    Screening, Brief Intervention, and Referral to Treatment (SBIRT)    Screening  Typical number of drinks in a day: 0  Typical number of drinks in a week: 0  Interpretation: Low risk drinking behavior.    AUDIT-C Screenin) How often did you have a drink containing alcohol in the past year? never  2) How many drinks did you have on a typical day when you were drinking in the past year? 0  3) How often did you have 6 or more drinks on one occasion in the past year? never    AUDIT-C Score: 0  Interpretation: Score 0-2 (female): Negative screen for alcohol misuse    Single Item Drug Screening:  How often have you used an illegal drug (including marijuana) or a prescription medication for non-medical reasons in the past year?  never    Single Item Drug Screen Score: 0  Interpretation: Negative screen for possible drug use disorder    Social Determinants of Health     Financial Resource Strain: Low Risk  (12/1/2022)    Overall Financial Resource Strain (CARDIA)     Difficulty of Paying Living Expenses: Not very hard   Food Insecurity: No Food Insecurity (9/15/2024)    Hunger Vital Sign     Worried About Running Out of Food in the Last Year: Never true     Ran Out of Food in the Last Year: Never true   Transportation Needs: No Transportation Needs (9/15/2024)    PRAPARE - Transportation     Lack of Transportation (Medical): No     Lack of Transportation (Non-Medical): No   Housing Stability: Low Risk  (9/15/2024)    Housing Stability Vital Sign     Unable to Pay for Housing in the Last Year: No     Number of Times Moved in the Last Year: 0     Homeless in the Last Year: No   Utilities: Not At Risk (9/15/2024)    [facility] Utilities     Threatened with loss of utilities: No     No results found.    Objective     There were no vitals taken for this visit.    Physical Exam  Vitals and nursing note reviewed.   Constitutional:       General: She is not in acute distress.     Appearance: Normal appearance. She is well-developed. She is not ill-appearing or diaphoretic.   HENT:      Head: Normocephalic and atraumatic.      Right Ear: Tympanic membrane, ear canal and external ear normal.      Left Ear: Tympanic membrane, ear canal and external ear normal.      Nose: Nose normal. No congestion or rhinorrhea.      Mouth/Throat:      Mouth: Mucous membranes are moist.      Pharynx: Oropharynx is clear. No oropharyngeal exudate or posterior oropharyngeal erythema.   Eyes:      General: No scleral icterus.        Right eye: No discharge.         Left eye: No discharge.      Extraocular Movements: Extraocular movements intact.      Conjunctiva/sclera: Conjunctivae normal.      Pupils: Pupils are equal, round, and reactive to light.   Neck:      Thyroid: No  thyromegaly.      Vascular: No carotid bruit or JVD.      Trachea: No tracheal deviation.   Cardiovascular:      Rate and Rhythm: Normal rate and regular rhythm.      Pulses: Normal pulses.      Heart sounds: Normal heart sounds. No murmur heard.  Pulmonary:      Effort: Pulmonary effort is normal. No respiratory distress.      Breath sounds: Normal breath sounds. No stridor. No wheezing, rhonchi or rales.   Abdominal:      General: Abdomen is flat. Bowel sounds are normal. There is no distension.      Palpations: Abdomen is soft. There is no mass.      Tenderness: There is no abdominal tenderness. There is no guarding or rebound.   Musculoskeletal:         General: No swelling, tenderness or deformity. Normal range of motion.      Cervical back: Normal range of motion and neck supple. No rigidity.      Right lower leg: No edema.      Left lower leg: No edema.   Lymphadenopathy:      Cervical: No cervical adenopathy.   Skin:     General: Skin is warm and dry.      Capillary Refill: Capillary refill takes less than 2 seconds.      Coloration: Skin is not jaundiced.      Findings: No bruising, erythema or rash.   Neurological:      General: No focal deficit present.      Mental Status: She is alert and oriented to person, place, and time.      Cranial Nerves: No cranial nerve deficit.      Sensory: No sensory deficit.      Motor: No abnormal muscle tone.      Coordination: Coordination normal.      Gait: Gait normal.      Deep Tendon Reflexes: Reflexes are normal and symmetric. Reflexes normal.   Psychiatric:         Mood and Affect: Mood normal.         Behavior: Behavior normal.         Thought Content: Thought content normal.         Judgment: Judgment normal.       Administrative Statements   I have spent a total time of 20 minutes in caring for this patient on the day of the visit/encounter including Diagnostic results, Prognosis, Risks and benefits of tx options, Instructions for management, Patient and family  education, and Importance of tx compliance.

## 2024-09-18 NOTE — PATIENT INSTRUCTIONS
Medicare Preventive Visit Patient Instructions  Thank you for completing your Welcome to Medicare Visit or Medicare Annual Wellness Visit today. Your next wellness visit will be due in one year (9/19/2025).  The screening/preventive services that you may require over the next 5-10 years are detailed below. Some tests may not apply to you based off risk factors and/or age. Screening tests ordered at today's visit but not completed yet may show as past due. Also, please note that scanned in results may not display below.  Preventive Screenings:  Service Recommendations Previous Testing/Comments   Colorectal Cancer Screening  * Colonoscopy    * Fecal Occult Blood Test (FOBT)/Fecal Immunochemical Test (FIT)  * Fecal DNA/Cologuard Test  * Flexible Sigmoidoscopy Age: 45-75 years old   Colonoscopy: every 10 years (may be performed more frequently if at higher risk)  OR  FOBT/FIT: every 1 year  OR  Cologuard: every 3 years  OR  Sigmoidoscopy: every 5 years  Screening may be recommended earlier than age 45 if at higher risk for colorectal cancer. Also, an individualized decision between you and your healthcare provider will decide whether screening between the ages of 76-85 would be appropriate. Colonoscopy: 03/06/2008  FOBT/FIT: Not on file  Cologuard: 07/10/2023  Sigmoidoscopy: Not on file          Breast Cancer Screening Age: 40+ years old  Frequency: every 1-2 years  Not required if history of left and right mastectomy Mammogram: 06/02/2011        Cervical Cancer Screening Between the ages of 21-29, pap smear recommended once every 3 years.   Between the ages of 30-65, can perform pap smear with HPV co-testing every 5 years.   Recommendations may differ for women with a history of total hysterectomy, cervical cancer, or abnormal pap smears in past. Pap Smear: Not on file        Hepatitis C Screening Once for adults born between 1945 and 1965  More frequently in patients at high risk for Hepatitis C Hep C Antibody:  01/03/2020        Diabetes Screening 1-2 times per year if you're at risk for diabetes or have pre-diabetes Fasting glucose: 164 mg/dL (8/29/2024)  A1C: 6.4 % (8/29/2024)      Cholesterol Screening Once every 5 years if you don't have a lipid disorder. May order more often based on risk factors. Lipid panel: 08/29/2024          Other Preventive Screenings Covered by Medicare:  Abdominal Aortic Aneurysm (AAA) Screening: covered once if your at risk. You're considered to be at risk if you have a family history of AAA.  Lung Cancer Screening: covers low dose CT scan once per year if you meet all of the following conditions: (1) Age 55-77; (2) No signs or symptoms of lung cancer; (3) Current smoker or have quit smoking within the last 15 years; (4) You have a tobacco smoking history of at least 20 pack years (packs per day multiplied by number of years you smoked); (5) You get a written order from a healthcare provider.  Glaucoma Screening: covered annually if you're considered high risk: (1) You have diabetes OR (2) Family history of glaucoma OR (3)  aged 50 and older OR (4)  American aged 65 and older  Osteoporosis Screening: covered every 2 years if you meet one of the following conditions: (1) You're estrogen deficient and at risk for osteoporosis based off medical history and other findings; (2) Have a vertebral abnormality; (3) On glucocorticoid therapy for more than 3 months; (4) Have primary hyperparathyroidism; (5) On osteoporosis medications and need to assess response to drug therapy.   Last bone density test (DXA Scan): 06/06/2002.  HIV Screening: covered annually if you're between the age of 15-65. Also covered annually if you are younger than 15 and older than 65 with risk factors for HIV infection. For pregnant patients, it is covered up to 3 times per pregnancy.    Immunizations:  Immunization Recommendations   Influenza Vaccine Annual influenza vaccination during flu season is  recommended for all persons aged >= 6 months who do not have contraindications   Pneumococcal Vaccine   * Pneumococcal conjugate vaccine = PCV13 (Prevnar 13), PCV15 (Vaxneuvance), PCV20 (Prevnar 20)  * Pneumococcal polysaccharide vaccine = PPSV23 (Pneumovax) Adults 19-65 yo with certain risk factors or if 65+ yo  If never received any pneumonia vaccine: recommend Prevnar 20 (PCV20)  Give PCV20 if previously received 1 dose of PCV13 or PPSV23   Hepatitis B Vaccine 3 dose series if at intermediate or high risk (ex: diabetes, end stage renal disease, liver disease)   Respiratory syncytial virus (RSV) Vaccine - COVERED BY MEDICARE PART D  * RSVPreF3 (Arexvy) CDC recommends that adults 60 years of age and older may receive a single dose of RSV vaccine using shared clinical decision-making (SCDM)   Tetanus (Td) Vaccine - COST NOT COVERED BY MEDICARE PART B Following completion of primary series, a booster dose should be given every 10 years to maintain immunity against tetanus. Td may also be given as tetanus wound prophylaxis.   Tdap Vaccine - COST NOT COVERED BY MEDICARE PART B Recommended at least once for all adults. For pregnant patients, recommended with each pregnancy.   Shingles Vaccine (Shingrix) - COST NOT COVERED BY MEDICARE PART B  2 shot series recommended in those 19 years and older who have or will have weakened immune systems or those 50 years and older     Health Maintenance Due:      Topic Date Due   • DXA SCAN  06/06/2007   • Breast Cancer Screening: Mammogram  06/02/2012   • Colorectal Cancer Screening  07/10/2026   • Hepatitis C Screening  Completed     Immunizations Due:  There are no preventive care reminders to display for this patient.  Advance Directives   What are advance directives?  Advance directives are legal documents that state your wishes and plans for medical care. These plans are made ahead of time in case you lose your ability to make decisions for yourself. Advance directives can  apply to any medical decision, such as the treatments you want, and if you want to donate organs.   What are the types of advance directives?  There are many types of advance directives, and each state has rules about how to use them. You may choose a combination of any of the following:  Living will:  This is a written record of the treatment you want. You can also choose which treatments you do not want, which to limit, and which to stop at a certain time. This includes surgery, medicine, IV fluid, and tube feedings.   Durable power of  for healthcare (DPAHC):  This is a written record that states who you want to make healthcare choices for you when you are unable to make them for yourself. This person, called a proxy, is usually a family member or a friend. You may choose more than 1 proxy.  Do not resuscitate (DNR) order:  A DNR order is used in case your heart stops beating or you stop breathing. It is a request not to have certain forms of treatment, such as CPR. A DNR order may be included in other types of advance directives.  Medical directive:  This covers the care that you want if you are in a coma, near death, or unable to make decisions for yourself. You can list the treatments you want for each condition. Treatment may include pain medicine, surgery, blood transfusions, dialysis, IV or tube feedings, and a ventilator (breathing machine).  Values history:  This document has questions about your views, beliefs, and how you feel and think about life. This information can help others choose the care that you would choose.  Why are advance directives important?  An advance directive helps you control your care. Although spoken wishes may be used, it is better to have your wishes written down. Spoken wishes can be misunderstood, or not followed. Treatments may be given even if you do not want them. An advance directive may make it easier for your family to make difficult choices about your care.    Weight Management   Why it is important to manage your weight:  Being overweight increases your risk of health conditions such as heart disease, high blood pressure, type 2 diabetes, and certain types of cancer. It can also increase your risk for osteoarthritis, sleep apnea, and other respiratory problems. Aim for a slow, steady weight loss. Even a small amount of weight loss can lower your risk of health problems.  How to lose weight safely:  A safe and healthy way to lose weight is to eat fewer calories and get regular exercise. You can lose up about 1 pound a week by decreasing the number of calories you eat by 500 calories each day.   Healthy meal plan for weight management:  A healthy meal plan includes a variety of foods, contains fewer calories, and helps you stay healthy. A healthy meal plan includes the following:  Eat whole-grain foods more often.  A healthy meal plan should contain fiber. Fiber is the part of grains, fruits, and vegetables that is not broken down by your body. Whole-grain foods are healthy and provide extra fiber in your diet. Some examples of whole-grain foods are whole-wheat breads and pastas, oatmeal, brown rice, and bulgur.  Eat a variety of vegetables every day.  Include dark, leafy greens such as spinach, kale, nikole greens, and mustard greens. Eat yellow and orange vegetables such as carrots, sweet potatoes, and winter squash.   Eat a variety of fruits every day.  Choose fresh or canned fruit (canned in its own juice or light syrup) instead of juice. Fruit juice has very little or no fiber.  Eat low-fat dairy foods.  Drink fat-free (skim) milk or 1% milk. Eat fat-free yogurt and low-fat cottage cheese. Try low-fat cheeses such as mozzarella and other reduced-fat cheeses.  Choose meat and other protein foods that are low in fat.  Choose beans or other legumes such as split peas or lentils. Choose fish, skinless poultry (chicken or turkey), or lean cuts of red meat (beef or  pork). Before you cook meat or poultry, cut off any visible fat.   Use less fat and oil.  Try baking foods instead of frying them. Add less fat, such as margarine, sour cream, regular salad dressing and mayonnaise to foods. Eat fewer high-fat foods. Some examples of high-fat foods include french fries, doughnuts, ice cream, and cakes.  Eat fewer sweets.  Limit foods and drinks that are high in sugar. This includes candy, cookies, regular soda, and sweetened drinks.  Exercise:  Exercise at least 30 minutes per day on most days of the week. Some examples of exercise include walking, biking, dancing, and swimming. You can also fit in more physical activity by taking the stairs instead of the elevator or parking farther away from stores. Ask your healthcare provider about the best exercise plan for you.      © Copyright [employer] 2018 Information is for End User's use only and may not be sold, redistributed or otherwise used for commercial purposes. All illustrations and images included in CareNotes® are the copyrighted property of [employer] or [employer]

## 2024-10-01 DIAGNOSIS — E03.9 HYPOTHYROIDISM, UNSPECIFIED TYPE: ICD-10-CM

## 2024-10-01 DIAGNOSIS — E78.00 HYPERCHOLESTEROLEMIA: ICD-10-CM

## 2024-10-01 RX ORDER — ROSUVASTATIN CALCIUM 10 MG/1
10 TABLET, COATED ORAL DAILY
Qty: 90 TABLET | Refills: 1 | Status: SHIPPED | OUTPATIENT
Start: 2024-10-01

## 2024-10-01 RX ORDER — LEVOTHYROXINE SODIUM 25 UG/1
25 TABLET ORAL DAILY
Qty: 90 TABLET | Refills: 1 | Status: SHIPPED | OUTPATIENT
Start: 2024-10-01

## 2025-01-15 ENCOUNTER — OFFICE VISIT (OUTPATIENT)
Dept: FAMILY MEDICINE CLINIC | Facility: CLINIC | Age: 75
End: 2025-01-15
Payer: COMMERCIAL

## 2025-01-15 ENCOUNTER — APPOINTMENT (OUTPATIENT)
Dept: LAB | Facility: CLINIC | Age: 75
End: 2025-01-15
Payer: COMMERCIAL

## 2025-01-15 VITALS
SYSTOLIC BLOOD PRESSURE: 124 MMHG | BODY MASS INDEX: 36.03 KG/M2 | DIASTOLIC BLOOD PRESSURE: 76 MMHG | TEMPERATURE: 97.4 F | OXYGEN SATURATION: 98 % | HEIGHT: 59 IN | HEART RATE: 68 BPM

## 2025-01-15 DIAGNOSIS — E03.8 OTHER SPECIFIED HYPOTHYROIDISM: ICD-10-CM

## 2025-01-15 DIAGNOSIS — E11.22 TYPE 2 DIABETES MELLITUS WITH CHRONIC KIDNEY DISEASE, WITHOUT LONG-TERM CURRENT USE OF INSULIN, UNSPECIFIED CKD STAGE (HCC): Primary | ICD-10-CM

## 2025-01-15 DIAGNOSIS — E11.22 TYPE 2 DIABETES MELLITUS WITH CHRONIC KIDNEY DISEASE, WITHOUT LONG-TERM CURRENT USE OF INSULIN, UNSPECIFIED CKD STAGE (HCC): ICD-10-CM

## 2025-01-15 DIAGNOSIS — E78.00 HYPERCHOLESTEROLEMIA: ICD-10-CM

## 2025-01-15 DIAGNOSIS — E78.2 MIXED HYPERLIPIDEMIA: ICD-10-CM

## 2025-01-15 DIAGNOSIS — F33.42 RECURRENT MAJOR DEPRESSIVE DISORDER, IN FULL REMISSION (HCC): ICD-10-CM

## 2025-01-15 LAB
ALBUMIN SERPL BCG-MCNC: 4.5 G/DL (ref 3.5–5)
ALP SERPL-CCNC: 55 U/L (ref 34–104)
ALT SERPL W P-5'-P-CCNC: 70 U/L (ref 7–52)
ANION GAP SERPL CALCULATED.3IONS-SCNC: 11 MMOL/L (ref 4–13)
AST SERPL W P-5'-P-CCNC: 57 U/L (ref 13–39)
BILIRUB SERPL-MCNC: 0.64 MG/DL (ref 0.2–1)
BUN SERPL-MCNC: 19 MG/DL (ref 5–25)
CALCIUM SERPL-MCNC: 9.4 MG/DL (ref 8.4–10.2)
CHLORIDE SERPL-SCNC: 104 MMOL/L (ref 96–108)
CHOLEST SERPL-MCNC: 121 MG/DL (ref ?–200)
CO2 SERPL-SCNC: 27 MMOL/L (ref 21–32)
CREAT SERPL-MCNC: 1.17 MG/DL (ref 0.6–1.3)
EST. AVERAGE GLUCOSE BLD GHB EST-MCNC: 131 MG/DL
GFR SERPL CREATININE-BSD FRML MDRD: 45 ML/MIN/1.73SQ M
GLUCOSE P FAST SERPL-MCNC: 125 MG/DL (ref 65–99)
HBA1C MFR BLD: 6.2 %
HDLC SERPL-MCNC: 40 MG/DL
LDLC SERPL CALC-MCNC: 45 MG/DL (ref 0–100)
POTASSIUM SERPL-SCNC: 4 MMOL/L (ref 3.5–5.3)
PROT SERPL-MCNC: 7.6 G/DL (ref 6.4–8.4)
SODIUM SERPL-SCNC: 142 MMOL/L (ref 135–147)
TRIGL SERPL-MCNC: 182 MG/DL (ref ?–150)
TSH SERPL DL<=0.05 MIU/L-ACNC: 1.62 UIU/ML (ref 0.45–4.5)

## 2025-01-15 PROCEDURE — 83036 HEMOGLOBIN GLYCOSYLATED A1C: CPT

## 2025-01-15 PROCEDURE — 80061 LIPID PANEL: CPT

## 2025-01-15 PROCEDURE — 80053 COMPREHEN METABOLIC PANEL: CPT

## 2025-01-15 PROCEDURE — 36415 COLL VENOUS BLD VENIPUNCTURE: CPT

## 2025-01-15 PROCEDURE — 99214 OFFICE O/P EST MOD 30 MIN: CPT | Performed by: FAMILY MEDICINE

## 2025-01-15 PROCEDURE — 84443 ASSAY THYROID STIM HORMONE: CPT

## 2025-01-15 PROCEDURE — G2211 COMPLEX E/M VISIT ADD ON: HCPCS | Performed by: FAMILY MEDICINE

## 2025-01-16 ENCOUNTER — TELEPHONE (OUTPATIENT)
Dept: FAMILY MEDICINE CLINIC | Facility: CLINIC | Age: 75
End: 2025-01-16

## 2025-01-16 ENCOUNTER — RESULTS FOLLOW-UP (OUTPATIENT)
Dept: FAMILY MEDICINE CLINIC | Facility: CLINIC | Age: 75
End: 2025-01-16

## 2025-01-16 DIAGNOSIS — R74.8 ELEVATED LIVER ENZYMES: Primary | ICD-10-CM

## 2025-01-22 NOTE — PROGRESS NOTES
Name: Maryann Sampson      : 1950      MRN: [id number]  Encounter Provider: Winnie Stanley DO  Encounter Date: 1/15/2025   Encounter department: [facility]    Assessment & Plan  Type 2 diabetes mellitus with chronic kidney disease, without long-term current use of insulin, unspecified CKD stage (HCC)  Stable with diet control.    Lab Results   Component Value Date    HGBA1C 6.2 (H) 01/15/2025            Other specified hypothyroidism  Continue with levothyroxine therpay.       Mixed hyperlipidemia  Stable on statin thearpy        Recurrent major depressive disorder, in full remission (HCC)  Cotninue celexa therapy              History of Present Illness     The pt presents for f/u of niddm, hypothyroidism, hyperlipidemia,and depression.  All are well controlled and she denies any chest pain, palpitations or other symptoms.        Review of Systems   Constitutional:  Negative for appetite change, chills and fever.   HENT:  Negative for ear pain, facial swelling, rhinorrhea, sinus pain, sore throat and trouble swallowing.    Eyes:  Negative for discharge and redness.   Respiratory:  Negative for chest tightness, shortness of breath and wheezing.    Cardiovascular:  Negative for chest pain and palpitations.   Gastrointestinal:  Negative for abdominal pain, diarrhea, nausea and vomiting.   Endocrine: Negative for polyuria.   Genitourinary:  Negative for dysuria and urgency.   Musculoskeletal:  Negative for arthralgias and back pain.   Skin:  Negative for rash.   Neurological:  Negative for dizziness, weakness and headaches.   Hematological:  Negative for adenopathy.   Psychiatric/Behavioral:  Negative for behavioral problems, confusion and sleep disturbance.    All other systems reviewed and are negative.    Past Medical History:   Diagnosis Date    Anxiety     Depression     Disease of thyroid gland     Hyperlipidemia     Stroke (HCC)     Visual impairment      Past Surgical  History:   Procedure Laterality Date    EYE SURGERY      cosmetic     Family History   Problem Relation Age of Onset    Depression Maternal Uncle     Diabetes Maternal Uncle     Mental illness Maternal Uncle         Killed himself was 28    Completed Suicide  Maternal Uncle         Had infantile diabetes    Glaucoma Sister      Social History     Tobacco Use    Smoking status: Never     Passive exposure: Past    Smokeless tobacco: Never   Vaping Use    Vaping status: Never Used   Substance and Sexual Activity    Alcohol use: Yes     Comment: Occasionally with meal    Drug use: Never    Sexual activity: Not Currently     Birth control/protection: Abstinence     Current Outpatient Medications on File Prior to Visit   Medication Sig    ALPRAZolam (XANAX) 0.25 mg tablet Take 1 tablet (0.25 mg total) by mouth 3 (three) times a day as needed for anxiety    Biotin 5000 MCG CAPS Take by mouth    cholecalciferol (VITAMIN D3) 1,000 units tablet Take 1,000 Units by mouth daily    citalopram (CeleXA) 20 mg tablet TAKE 1 AND 1/2 TABLETS BY MOUTH ONCE DAILY    Cyanocobalamin (Vitamin B 12) 500 MCG TABS Take 1,000 mg by mouth daily    levothyroxine 25 mcg tablet Take 1 tablet by mouth once daily    pantoprazole (PROTONIX) 40 mg tablet TAKE 1 TABLET BY MOUTH ONCE DAILY BEFORE BREAKFAST    rosuvastatin (CRESTOR) 10 MG tablet Take 1 tablet by mouth once daily    solifenacin (VESICARE) 10 MG tablet Take 1 tablet (10 mg total) by mouth daily     Allergies   Allergen Reactions    Statins      myalgia     Immunization History   Administered Date(s) Administered    COVID-19 PFIZER VACCINE 0.3 ML IM 03/07/2021, 03/29/2021, 10/14/2021    COVID-19 Pfizer Vac BIVALENT Richie-sucrose 12 Yr+ IM 09/28/2022    COVID-19 Pfizer mRNA vacc PF richie-sucrose 12 yr and older (Comirnaty) 10/18/2023, 09/03/2024    H1N1, All Formulations 02/05/2010    INFLUENZA 10/27/2015, 09/21/2016, 10/23/2017, 11/10/2017, 10/18/2023    Influenza Split High Dose  "Preservative Free IM 10/27/2015, 09/21/2016, 11/10/2017, 09/03/2024    Influenza, high dose seasonal 0.7 mL 11/19/2018, 10/16/2019, 10/09/2020, 12/07/2021, 10/19/2022    Influenza, seasonal, injectable 01/24/2013, 10/22/2013    Pneumococcal Conjugate 13-Valent 09/21/2016    Pneumococcal Conjugate Vaccine 20-valent (Pcv20), Polysace 09/18/2024    Pneumococcal Polysaccharide PPV23 05/12/2016     Objective   /76   Pulse 68   Temp (!) 97.4 °F (36.3 °C)   Ht 4' 11\" (1.499 m)   SpO2 98%   BMI 36.03 kg/m²     Physical Exam  Vitals and nursing note reviewed.   Constitutional:       General: She is not in acute distress.     Appearance: Normal appearance. She is well-developed. She is not ill-appearing or diaphoretic.   HENT:      Head: Normocephalic and atraumatic.      Right Ear: Tympanic membrane, ear canal and external ear normal.      Left Ear: Tympanic membrane, ear canal and external ear normal.      Nose: Nose normal. No congestion or rhinorrhea.      Mouth/Throat:      Mouth: Mucous membranes are moist.      Pharynx: Oropharynx is clear. No oropharyngeal exudate or posterior oropharyngeal erythema.   Eyes:      General: No scleral icterus.        Right eye: No discharge.         Left eye: No discharge.      Extraocular Movements: Extraocular movements intact.      Conjunctiva/sclera: Conjunctivae normal.      Pupils: Pupils are equal, round, and reactive to light.   Neck:      Thyroid: No thyromegaly.      Vascular: No carotid bruit or JVD.      Trachea: No tracheal deviation.   Cardiovascular:      Rate and Rhythm: Normal rate and regular rhythm.      Pulses: Normal pulses. no weak pulses.           Dorsalis pedis pulses are 2+ on the right side and 2+ on the left side.        Posterior tibial pulses are 2+ on the right side and 2+ on the left side.      Heart sounds: Normal heart sounds. No murmur heard.  Pulmonary:      Effort: Pulmonary effort is normal. No respiratory distress.      Breath sounds: " Normal breath sounds. No stridor. No wheezing, rhonchi or rales.   Abdominal:      General: Abdomen is flat. Bowel sounds are normal. There is no distension.      Palpations: Abdomen is soft. There is no mass.      Tenderness: There is no abdominal tenderness. There is no guarding or rebound.   Musculoskeletal:         General: No swelling, tenderness or deformity. Normal range of motion.      Cervical back: Normal range of motion and neck supple. No rigidity.      Right lower leg: No edema.      Left lower leg: No edema.   Feet:      Right foot:      Skin integrity: No ulcer, skin breakdown, erythema, warmth, callus or dry skin.      Left foot:      Skin integrity: No ulcer, skin breakdown, erythema, warmth, callus or dry skin.   Lymphadenopathy:      Cervical: No cervical adenopathy.   Skin:     General: Skin is warm and dry.      Capillary Refill: Capillary refill takes less than 2 seconds.      Coloration: Skin is not jaundiced.      Findings: No bruising, erythema or rash.   Neurological:      General: No focal deficit present.      Mental Status: She is alert and oriented to person, place, and time.      Cranial Nerves: No cranial nerve deficit.      Sensory: No sensory deficit.      Motor: No abnormal muscle tone.      Coordination: Coordination normal.      Gait: Gait normal.      Deep Tendon Reflexes: Reflexes are normal and symmetric. Reflexes normal.   Psychiatric:         Mood and Affect: Mood normal.         Behavior: Behavior normal.         Thought Content: Thought content normal.         Judgment: Judgment normal.     Patient's shoes and socks removed.    Right Foot/Ankle   Right Foot Inspection  Skin Exam: skin normal and skin intact. No dry skin, no warmth, no callus, no erythema, no maceration, no abnormal color, no pre-ulcer, no ulcer and no callus.     Toe Exam: ROM and strength within normal limits. No swelling, no tenderness and erythema    Sensory   Vibration: intact  Proprioception:  intact  Monofilament testing: intact    Vascular  Capillary refills: < 3 seconds  The right DP pulse is 2+. The right PT pulse is 2+.     Left Foot/Ankle  Left Foot Inspection  Skin Exam: skin normal and skin intact. No dry skin, no warmth, no erythema, no maceration, normal color, no pre-ulcer, no ulcer and no callus.     Toe Exam: ROM and strength within normal limits. No swelling, no tenderness, no erythema and no left toe deformity.     Sensory   Vibration: intact  Proprioception: intact  Monofilament testing: intact    Vascular  Capillary refills: < 3 seconds  The left DP pulse is 2+. The left PT pulse is 2+.     Assign Risk Category  No deformity present  No loss of protective sensation  No weak pulses  Risk: 0     Administrative Statements   I have spent a total time of 20 minutes in caring for this patient on the day of the visit/encounter including Diagnostic results, Prognosis, Risks and benefits of tx options, Instructions for management, and Patient and family education. Topics discussed with the patient / family include symptom assessment and management, medication review, medication adjustment, psychosocial support, and advanced directives.

## 2025-01-22 NOTE — ASSESSMENT & PLAN NOTE
Stable with diet control.    Lab Results   Component Value Date    HGBA1C 6.2 (H) 01/15/2025

## 2025-02-03 DIAGNOSIS — N32.81 OVERACTIVE BLADDER: ICD-10-CM

## 2025-02-03 DIAGNOSIS — F33.42 RECURRENT MAJOR DEPRESSIVE DISORDER, IN FULL REMISSION (HCC): ICD-10-CM

## 2025-02-03 RX ORDER — SOLIFENACIN SUCCINATE 10 MG/1
10 TABLET, FILM COATED ORAL DAILY
Qty: 90 TABLET | Refills: 1 | Status: SHIPPED | OUTPATIENT
Start: 2025-02-03

## 2025-02-03 RX ORDER — CITALOPRAM HYDROBROMIDE 20 MG/1
TABLET ORAL
Qty: 135 TABLET | Refills: 0 | Status: SHIPPED | OUTPATIENT
Start: 2025-02-03

## 2025-02-03 NOTE — TELEPHONE ENCOUNTER
Reason for call:   [x] Refill   [] Prior Auth  [] Other:     Office:   [x] PCP/Provider - Winnie Stanley,    [] Specialty/Provider -     Medication:  citalopram (CeleXA) 20 mg tablet    Dose/Frequency: TAKE 1 AND 1/2 TABLETS BY MOUTH ONCE DAILY,     Quantity: 135    Pharmacy: [facility] [address] [place] [address]      Does the patient have enough for 3 days?   [] Yes   [x] No - Send as HP to POD    Reason for call:   [x] Refill   [] Prior Auth  [] Other:     Office:   [x] PCP/Provider - Winnie Stanley DO   [] Specialty/Provider -     Medication: solifenacin (VESICARE) 10 MG tablet     Dose/Frequency: Take 1 tablet (10 mg total) by mouth daily,     Quantity: 90    Pharmacy: [facility] [address] [place] [address]      Does the patient have enough for 3 days?   [] Yes   [x] No - Send as HP to POD

## 2025-03-02 DIAGNOSIS — K21.9 GASTROESOPHAGEAL REFLUX DISEASE WITHOUT ESOPHAGITIS: ICD-10-CM

## 2025-03-03 RX ORDER — PANTOPRAZOLE SODIUM 40 MG/1
TABLET, DELAYED RELEASE ORAL
Qty: 30 TABLET | Refills: 5 | Status: SHIPPED | OUTPATIENT
Start: 2025-03-03

## 2025-04-03 DIAGNOSIS — E03.9 HYPOTHYROIDISM, UNSPECIFIED TYPE: ICD-10-CM

## 2025-04-04 RX ORDER — LEVOTHYROXINE SODIUM 25 UG/1
25 TABLET ORAL DAILY
Qty: 90 TABLET | Refills: 1 | Status: SHIPPED | OUTPATIENT
Start: 2025-04-04

## 2025-04-30 DIAGNOSIS — F33.42 RECURRENT MAJOR DEPRESSIVE DISORDER, IN FULL REMISSION (HCC): ICD-10-CM

## 2025-05-01 RX ORDER — CITALOPRAM HYDROBROMIDE 20 MG/1
30 TABLET ORAL DAILY
Qty: 135 TABLET | Refills: 1 | Status: SHIPPED | OUTPATIENT
Start: 2025-05-01

## 2025-05-06 ENCOUNTER — TELEPHONE (OUTPATIENT)
Age: 75
End: 2025-05-06

## 2025-05-06 NOTE — TELEPHONE ENCOUNTER
Pt called about cost of vesicare.  She said it is $63.00 and she wonders if she can do without it, or if there might be something else cheaper she can try.

## 2025-05-07 DIAGNOSIS — N32.81 OVERACTIVE BLADDER: Primary | ICD-10-CM

## 2025-05-07 RX ORDER — TOLTERODINE 4 MG/1
4 CAPSULE, EXTENDED RELEASE ORAL DAILY
Qty: 30 CAPSULE | Refills: 3 | Status: SHIPPED | OUTPATIENT
Start: 2025-05-07

## 2025-07-16 ENCOUNTER — HOSPITAL ENCOUNTER (INPATIENT)
Facility: HOSPITAL | Age: 75
LOS: 1 days | Discharge: HOME | End: 2025-07-18
Attending: EMERGENCY MEDICINE | Admitting: STUDENT IN AN ORGANIZED HEALTH CARE EDUCATION/TRAINING PROGRAM
Payer: COMMERCIAL

## 2025-07-16 ENCOUNTER — APPOINTMENT (EMERGENCY)
Dept: RADIOLOGY | Facility: HOSPITAL | Age: 75
End: 2025-07-16
Payer: COMMERCIAL

## 2025-07-16 DIAGNOSIS — Z13.9 ENCOUNTER FOR SCREENING INVOLVING SOCIAL DETERMINANTS OF HEALTH (SDOH): ICD-10-CM

## 2025-07-16 DIAGNOSIS — R94.31 PROLONGED Q-T INTERVAL ON ECG: ICD-10-CM

## 2025-07-16 DIAGNOSIS — I1A.0 RESISTANT HYPERTENSION: ICD-10-CM

## 2025-07-16 DIAGNOSIS — R55 NEAR SYNCOPE: Primary | ICD-10-CM

## 2025-07-16 PROBLEM — R42 POSTURAL DIZZINESS WITH PRESYNCOPE: Status: ACTIVE | Noted: 2025-07-16

## 2025-07-16 LAB
ANION GAP SERPL CALCULATED.3IONS-SCNC: 12 MMOL/L (ref 4–13)
BASOPHILS # BLD AUTO: 0.04 THOUSANDS/ÂΜL (ref 0–0.1)
BASOPHILS NFR BLD AUTO: 1 % (ref 0–1)
BUN SERPL-MCNC: 16 MG/DL (ref 5–25)
CALCIUM SERPL-MCNC: 8.9 MG/DL (ref 8.4–10.2)
CARDIAC TROPONIN I PNL SERPL HS: <2 NG/L (ref ?–50)
CHLORIDE SERPL-SCNC: 110 MMOL/L (ref 96–108)
CO2 SERPL-SCNC: 21 MMOL/L (ref 21–32)
CREAT SERPL-MCNC: 1.31 MG/DL (ref 0.6–1.3)
EOSINOPHIL # BLD AUTO: 0.03 THOUSAND/ÂΜL (ref 0–0.61)
EOSINOPHIL NFR BLD AUTO: 0 % (ref 0–6)
ERYTHROCYTE [DISTWIDTH] IN BLOOD BY AUTOMATED COUNT: 13.7 % (ref 11.6–15.1)
GFR SERPL CREATININE-BSD FRML MDRD: 39 ML/MIN/1.73SQ M
GLUCOSE SERPL-MCNC: 130 MG/DL (ref 65–140)
HCT VFR BLD AUTO: 37.9 % (ref 34.8–46.1)
HGB BLD-MCNC: 12.6 G/DL (ref 11.5–15.4)
IMM GRANULOCYTES # BLD AUTO: 0.04 THOUSAND/UL (ref 0–0.2)
IMM GRANULOCYTES NFR BLD AUTO: 1 % (ref 0–2)
LYMPHOCYTES # BLD AUTO: 0.87 THOUSANDS/ÂΜL (ref 0.6–4.47)
LYMPHOCYTES NFR BLD AUTO: 11 % (ref 14–44)
MAGNESIUM SERPL-MCNC: 1.8 MG/DL (ref 1.9–2.7)
MCH RBC QN AUTO: 28.8 PG (ref 26.8–34.3)
MCHC RBC AUTO-ENTMCNC: 33.2 G/DL (ref 31.4–37.4)
MCV RBC AUTO: 87 FL (ref 82–98)
MONOCYTES # BLD AUTO: 0.46 THOUSAND/ÂΜL (ref 0.17–1.22)
MONOCYTES NFR BLD AUTO: 6 % (ref 4–12)
NEUTROPHILS # BLD AUTO: 6.31 THOUSANDS/ÂΜL (ref 1.85–7.62)
NEUTS SEG NFR BLD AUTO: 81 % (ref 43–75)
NRBC BLD AUTO-RTO: 0 /100 WBCS
PLATELET # BLD AUTO: 200 THOUSANDS/UL (ref 149–390)
PMV BLD AUTO: 12 FL (ref 8.9–12.7)
POTASSIUM SERPL-SCNC: 3.6 MMOL/L (ref 3.5–5.3)
RBC # BLD AUTO: 4.38 MILLION/UL (ref 3.81–5.12)
SODIUM SERPL-SCNC: 143 MMOL/L (ref 135–147)
TSH SERPL DL<=0.05 MIU/L-ACNC: 2.6 UIU/ML (ref 0.45–4.5)
WBC # BLD AUTO: 7.75 THOUSAND/UL (ref 4.31–10.16)

## 2025-07-16 PROCEDURE — 96365 THER/PROPH/DIAG IV INF INIT: CPT

## 2025-07-16 PROCEDURE — 90471 IMMUNIZATION ADMIN: CPT

## 2025-07-16 PROCEDURE — 71045 X-RAY EXAM CHEST 1 VIEW: CPT

## 2025-07-16 PROCEDURE — 84484 ASSAY OF TROPONIN QUANT: CPT

## 2025-07-16 PROCEDURE — 85025 COMPLETE CBC W/AUTO DIFF WBC: CPT

## 2025-07-16 PROCEDURE — 83735 ASSAY OF MAGNESIUM: CPT

## 2025-07-16 PROCEDURE — 84443 ASSAY THYROID STIM HORMONE: CPT

## 2025-07-16 PROCEDURE — 99285 EMERGENCY DEPT VISIT HI MDM: CPT

## 2025-07-16 PROCEDURE — 90715 TDAP VACCINE 7 YRS/> IM: CPT

## 2025-07-16 PROCEDURE — 96366 THER/PROPH/DIAG IV INF ADDON: CPT

## 2025-07-16 PROCEDURE — 80048 BASIC METABOLIC PNL TOTAL CA: CPT

## 2025-07-16 PROCEDURE — 99284 EMERGENCY DEPT VISIT MOD MDM: CPT

## 2025-07-16 PROCEDURE — 36415 COLL VENOUS BLD VENIPUNCTURE: CPT

## 2025-07-16 PROCEDURE — 99223 1ST HOSP IP/OBS HIGH 75: CPT

## 2025-07-16 RX ORDER — ONDANSETRON 2 MG/ML
4 INJECTION INTRAMUSCULAR; INTRAVENOUS EVERY 6 HOURS PRN
Status: DISCONTINUED | OUTPATIENT
Start: 2025-07-16 | End: 2025-07-16 | Stop reason: SDUPTHER

## 2025-07-16 RX ORDER — PANTOPRAZOLE SODIUM 40 MG/1
40 TABLET, DELAYED RELEASE ORAL
Status: DISCONTINUED | OUTPATIENT
Start: 2025-07-17 | End: 2025-07-18 | Stop reason: HOSPADM

## 2025-07-16 RX ORDER — MAGNESIUM HYDROXIDE/ALUMINUM HYDROXICE/SIMETHICONE 120; 1200; 1200 MG/30ML; MG/30ML; MG/30ML
30 SUSPENSION ORAL EVERY 6 HOURS PRN
Status: DISCONTINUED | OUTPATIENT
Start: 2025-07-16 | End: 2025-07-18 | Stop reason: HOSPADM

## 2025-07-16 RX ORDER — OXYBUTYNIN CHLORIDE 5 MG/1
10 TABLET, EXTENDED RELEASE ORAL DAILY
Status: DISCONTINUED | OUTPATIENT
Start: 2025-07-17 | End: 2025-07-18 | Stop reason: HOSPADM

## 2025-07-16 RX ORDER — ENOXAPARIN SODIUM 100 MG/ML
40 INJECTION SUBCUTANEOUS DAILY
Status: DISCONTINUED | OUTPATIENT
Start: 2025-07-17 | End: 2025-07-18 | Stop reason: HOSPADM

## 2025-07-16 RX ORDER — ACETAMINOPHEN 325 MG/1
975 TABLET ORAL EVERY 8 HOURS PRN
Status: DISCONTINUED | OUTPATIENT
Start: 2025-07-16 | End: 2025-07-18 | Stop reason: HOSPADM

## 2025-07-16 RX ORDER — CITALOPRAM HYDROBROMIDE 20 MG/1
30 TABLET ORAL DAILY
Status: DISCONTINUED | OUTPATIENT
Start: 2025-07-17 | End: 2025-07-18 | Stop reason: HOSPADM

## 2025-07-16 RX ORDER — LEVOTHYROXINE SODIUM 25 UG/1
25 TABLET ORAL
Status: DISCONTINUED | OUTPATIENT
Start: 2025-07-17 | End: 2025-07-18 | Stop reason: HOSPADM

## 2025-07-16 RX ORDER — MAGNESIUM SULFATE HEPTAHYDRATE 40 MG/ML
2 INJECTION, SOLUTION INTRAVENOUS ONCE
Status: COMPLETED | OUTPATIENT
Start: 2025-07-16 | End: 2025-07-16

## 2025-07-16 RX ORDER — BACITRACIN, NEOMYCIN, POLYMYXIN B 400; 3.5; 5 [USP'U]/G; MG/G; [USP'U]/G
1 OINTMENT TOPICAL ONCE
Status: COMPLETED | OUTPATIENT
Start: 2025-07-16 | End: 2025-07-16

## 2025-07-16 RX ORDER — POLYETHYLENE GLYCOL 3350 17 G/17G
17 POWDER, FOR SOLUTION ORAL DAILY PRN
Status: DISCONTINUED | OUTPATIENT
Start: 2025-07-16 | End: 2025-07-18 | Stop reason: HOSPADM

## 2025-07-16 RX ADMIN — TETANUS TOXOID, REDUCED DIPHTHERIA TOXOID AND ACELLULAR PERTUSSIS VACCINE, ADSORBED 0.5 ML: 5; 2.5; 8; 8; 2.5 SUSPENSION INTRAMUSCULAR at 18:17

## 2025-07-16 RX ADMIN — MAGNESIUM SULFATE HEPTAHYDRATE 2 G: 40 INJECTION, SOLUTION INTRAVENOUS at 19:01

## 2025-07-16 RX ADMIN — BACITRACIN ZINC, NEOMYCIN SULFATE , POLYMYXIN B SULFATE. 1 SMALL APPLICATION: 400; 3.5; 5 OINTMENT TOPICAL at 18:15

## 2025-07-16 NOTE — ED PROVIDER NOTES
Time reflects when diagnosis was documented in both MDM as applicable and the Disposition within this note       Time User Action Codes Description Comment    7/16/2025  8:01 PM Shakira Martinez Add [R55] Near syncope     7/16/2025  8:01 PM Shakira Martinez Add [R94.31] Prolonged Q-T interval on ECG           ED Disposition       ED Disposition   Admit    Condition   Stable    Date/Time   Wed Jul 16, 2025  9:11 PM    Comment   Case was discussed with DALE and the patient's admission status was agreed to be Admission Status: observation status to the service of Dr. Mccoy .               Assessment & Plan       Medical Decision Making  Patient is a 74-year-old female presenting for evaluation of a near syncopal episode.  Upon examination, patient is well-appearing does not appear in acute distress.  Vital signs are stable.  Neurological examination is intact.  Prehospital cervical collar was removed and patient does not have any midline cervical bony tenderness.  Full ROM without any discomfort.  C-collar was cleared at that time.  Normal heart and lung sounds noted.  No tenderness upon palpation of the abdomen.  There is an abrasion to her right elbow but she does not have any bony tenderness to her elbow.  Normal flexion and extension without any pain.    Differentials include but are not limited to: ACS, arrhythmia, electrolyte imbalance, thyroid dysfunction, dehydration, hypoglycemia, and heat exhaustion.    Blood work obtained which showed a magnesium of 1.8, repleted with IV magnesium.  Otherwise, blood work is unremarkable.  TSH was normal.  EKG showing normal sinus rhythm with a heart rate of 75 and a QTc of 522.  No previous EKG available for comparison. HEART score=4.  Chest x-ray was normal.  Patient was provided with a tetanus shot as it was not on file.  Wound was cleansed with soap and water and bacitracin was applied.  Patient's case was discussed with internal medicine who agreed for  observation/telemetry.  Patient's condition remained stable during her encounter in the ED.    Amount and/or Complexity of Data Reviewed  Labs: ordered. Decision-making details documented in ED Course.     Details: Reviewed   Radiology: ordered and independent interpretation performed. Decision-making details documented in ED Course.     Details: Reviewed     Risk  OTC drugs.  Prescription drug management.  Decision regarding hospitalization.        ED Course as of 07/16/25 2113 Wed Jul 16, 2025   1831 CBC and differential(!)  No leukocytosis. Hemoglobin is stable.    1852 HS Troponin 0hr (reflex protocol)  Negative    1852 Basic metabolic panel(!)  Mildly elevated creatinine at 1.31, no DARWIN.    1852 TSH 3RD GENERATON: 2.598   1932 MAGNESIUM(!): 1.8  Repleting with 2g IV Mag   1937 XR chest 1 view portable  No pneumonia noted on my interpretation.        Medications   neomycin-bacitracin-polymyxin b (NEOSPORIN) ointment 1 small application (1 small application Topical Given 7/16/25 1815)   tetanus-diphtheria-acellular pertussis (BOOSTRIX) IM injection 0.5 mL (0.5 mL Intramuscular Given 7/16/25 1817)   magnesium sulfate 2 g/50 mL IVPB (premix) 2 g (0 g Intravenous Stopped 7/16/25 2055)       ED Risk Strat Scores   HEART Risk Score      Flowsheet Row Most Recent Value   Heart Score Risk Calculator    History 0 Filed at: 07/16/2025 1900   ECG 1 Filed at: 07/16/2025 1900   Age 2 Filed at: 07/16/2025 1900   Risk Factors 1 Filed at: 07/16/2025 1900   Troponin 0 Filed at: 07/16/2025 1900   HEART Score 4 Filed at: 07/16/2025 1900          HEART Risk Score      Flowsheet Row Most Recent Value   Heart Score Risk Calculator    History 0 Filed at: 07/16/2025 1900   ECG 1 Filed at: 07/16/2025 1900   Age 2 Filed at: 07/16/2025 1900   Risk Factors 1 Filed at: 07/16/2025 1900   Troponin 0 Filed at: 07/16/2025 1900   HEART Score 4 Filed at: 07/16/2025 1900                      No data recorded        SBIRT 20yo+      Flowsheet Row  "Most Recent Value   Initial Alcohol Screen: US AUDIT-C     1. How often do you have a drink containing alcohol? 0 Filed at: 07/16/2025 1825   2. How many drinks containing alcohol do you have on a typical day you are drinking?  0 Filed at: 07/16/2025 1825   3a. Male UNDER 65: How often do you have five or more drinks on one occasion? 0 Filed at: 07/16/2025 1825   3b. FEMALE Any Age, or MALE 65+: How often do you have 4 or more drinks on one occassion? 0 Filed at: 07/16/2025 1825   Audit-C Score 0 Filed at: 07/16/2025 1825   MARY: How many times in the past year have you...    Used an illegal drug or used a prescription medication for non-medical reasons? Never Filed at: 07/16/2025 1825                            History of Present Illness       Chief Complaint   Patient presents with    Fall     Pt was shopping at [employer], walked outside and had near syncope. States she felt like she was going to pass out and had a fall on the cement in the parking. -headstrike. Abrasion noted to right elbow. Pt reports she did not eat much today.        Past Medical History[1]   Past Surgical History[2]   Family History[3]   Social History[4]   E-Cigarette/Vaping    E-Cigarette Use Never User       E-Cigarette/Vaping Substances    Nicotine No     THC No     CBD No     Flavoring No     Other No     Unknown No       I have reviewed and agree with the history as documented.     Patient is a 74-year-old female with past medical history including hyperlipidemia, diabetes mellitus, CKD 3, thyroid disease, anxiety, and depression.  Presents today for evaluation of near syncope.  Patient states that she was unloading her groceries from the cart into her car when she suddenly felt like she was going to pass out.  At that time felt lightheaded and attributes it to not really eating or drinking much today as she was running around trying to do many errands.  She did \"slowly trickle\" to the ground onto her right elbow but she denies any head " strike or loss of consciousness. No blood thinner/ASA use.  She denies any headache, visual changes, chest pain, palpitations, or shortness of breath precipitating the near syncopal episode.  Patient did report one small episode of vomiting when she was being loaded into the ambulance but it has since subsided.       Fall  Associated symptoms: nausea (resolved) and vomiting (once)    Associated symptoms: no abdominal pain, no chest pain and no headaches        Review of Systems   Constitutional:  Negative for chills and fever.   Eyes:  Negative for visual disturbance.   Respiratory:  Negative for shortness of breath.    Cardiovascular:  Negative for chest pain.   Gastrointestinal:  Positive for nausea (resolved) and vomiting (once). Negative for abdominal pain.   Genitourinary:  Negative for dysuria, frequency and urgency.   Neurological:  Positive for light-headedness. Negative for dizziness, weakness and headaches.   All other systems reviewed and are negative.          Objective       ED Triage Vitals [07/16/25 1745]   Temperature Pulse Blood Pressure Respirations SpO2 Patient Position - Orthostatic VS   98.2 °F (36.8 °C) 66 144/70 18 98 % Sitting      Temp Source Heart Rate Source BP Location FiO2 (%) Pain Score    Oral Monitor Right arm -- 4      Vitals      Date and Time Temp Pulse SpO2 Resp BP Pain Score FACES Pain Rating User   07/16/25 2109 -- -- -- -- 152/78 -- -- RL   07/16/25 2052 -- 71 -- 18 -- -- -- RN   07/16/25 1745 98.2 °F (36.8 °C) 66 98 % 18 144/70 4 -- RL            Physical Exam  Vitals and nursing note reviewed.   Constitutional:       Appearance: Normal appearance.     Cardiovascular:      Rate and Rhythm: Normal rate.      Heart sounds: Normal heart sounds.   Pulmonary:      Effort: Pulmonary effort is normal.      Breath sounds: Normal breath sounds.   Abdominal:      General: Abdomen is flat.      Palpations: Abdomen is soft.      Tenderness: There is no abdominal tenderness.      Musculoskeletal:         General: Normal range of motion.      Cervical back: Normal range of motion and neck supple. No tenderness.     Skin:     General: Skin is warm and dry.      Capillary Refill: Capillary refill takes less than 2 seconds.      Findings: Abrasion present.      Comments: Abrasion to right elbow     Neurological:      General: No focal deficit present.      Mental Status: She is alert and oriented to person, place, and time.     Psychiatric:         Mood and Affect: Mood normal.         Behavior: Behavior normal.         Results Reviewed       Procedure Component Value Units Date/Time    Magnesium [[id number]]  (Abnormal) Collected: 07/16/25 1802    Lab Status: Final result Specimen: Blood from Hand, Left Updated: 07/16/25 1914     Magnesium 1.8 mg/dL     TSH [[id number]]  (Normal) Collected: 07/16/25 1802    Lab Status: Final result Specimen: Blood from Hand, Left Updated: 07/16/25 1843     TSH 3RD GENERATION 2.598 uIU/mL     HS Troponin 0hr (reflex protocol) [[id number]]  (Normal) Collected: 07/16/25 1802    Lab Status: Final result Specimen: Blood from Hand, Left Updated: 07/16/25 1834     hs TnI 0hr <2 ng/L     Basic metabolic panel [[id number]]  (Abnormal) Collected: 07/16/25 1802    Lab Status: Final result Specimen: Blood from Hand, Left Updated: 07/16/25 1834     Sodium 143 mmol/L      Potassium 3.6 mmol/L      Chloride 110 mmol/L      CO2 21 mmol/L      ANION GAP 12 mmol/L      BUN 16 mg/dL      Creatinine 1.31 mg/dL      Glucose 130 mg/dL      Calcium 8.9 mg/dL      eGFR 39 ml/min/1.73sq m     Narrative:      National Kid[employer] Disease Foundation guidelines for Chronic Kidney Disease (CKD):     Stage 1 with normal or high GFR (GFR > 90 mL/min/1.73 square meters)    Stage 2 Mild CKD (GFR = 60-89 mL/min/1.73 square meters)    Stage 3A Moderate CKD (GFR = 45-59 mL/min/1.73 square meters)    Stage 3B Moderate CKD (GFR = 30-44 mL/min/1.73 square meters)    Stage 4 Severe CKD (GFR = 15-29  mL/min/1.73 square meters)    Stage 5 End Stage CKD (GFR <15 mL/min/1.73 square meters)  Note: GFR calculation is accurate only with a steady state creatinine    CBC and differential [[id number]]  (Abnormal) Collected: 07/16/25 1802    Lab Status: Final result Specimen: Blood from Hand, Left Updated: 07/16/25 1818     WBC 7.75 Thousand/uL      RBC 4.38 Million/uL      Hemoglobin 12.6 g/dL      Hematocrit 37.9 %      MCV 87 fL      MCH 28.8 pg      MCHC 33.2 g/dL      RDW 13.7 %      MPV 12.0 fL      Platelets 200 Thousands/uL      nRBC 0 /100 WBCs      Segmented % 81 %      Immature Grans % 1 %      Lymphocytes % 11 %      Monocytes % 6 %      Eosinophils Relative 0 %      Basophils Relative 1 %      Absolute Neutrophils 6.31 Thousands/µL      Absolute Immature Grans 0.04 Thousand/uL      Absolute Lymphocytes 0.87 Thousands/µL      Absolute Monocytes 0.46 Thousand/µL      Eosinophils Absolute 0.03 Thousand/µL      Basophils Absolute 0.04 Thousands/µL             XR chest 1 view portable   ED Interpretation by KATE Villela (07/16 1937)   No pneumonia noted on my interpretation of x-ray.           ECG 12 Lead Documentation Only    Date/Time: 7/16/2025 7:59 PM    Performed by: KATE Villela  Authorized by: KATE Villela    Indications / Diagnosis:  Near syncope  ECG reviewed by me, the ED Provider: yes    Patient location:  ED  Previous ECG:     Previous ECG:  Unavailable  Interpretation:     Interpretation: abnormal    Rate:     ECG rate:  75    ECG rate assessment: normal    Rhythm:     Rhythm: sinus rhythm    Ectopy:     Ectopy: none    QRS:     QRS axis:  Normal    QRS intervals:  Wide  Conduction:     Conduction: normal    ST segments:     ST segments:  Normal  T waves:     T waves: non-specific    Comments:      [id number]      ED Medication and Procedure Management   Prior to Admission Medications   Prescriptions Last Dose Informant Patient Reported? Taking?   ALPRAZolam (XANAX)  0.25 mg tablet  Self No No   Sig: Take 1 tablet (0.25 mg total) by mouth 3 (three) times a day as needed for anxiety   Biotin 5000 MCG CAPS  Self Yes No   Sig: Take by mouth   Cyanocobalamin (Vitamin B 12) 500 MCG TABS  Self Yes No   Sig: Take 1,000 mg by mouth daily   cholecalciferol (VITAMIN D3) 1,000 units tablet  Self Yes No   Sig: Take 1,000 Units by mouth daily   citalopram (CeleXA) 20 mg tablet   No No   Sig: TAKE 1 & 1/2 (ONE & ONE-HALF) TABLETS BY MOUTH ONCE DAILY   levothyroxine 25 mcg tablet   No No   Sig: Take 1 tablet by mouth once daily   pantoprazole (PROTONIX) 40 mg tablet   No No   Sig: TAKE 1 TABLET BY MOUTH ONCE DAILY BEFORE BREAKFAST   rosuvastatin (CRESTOR) 10 MG tablet  Self No No   Sig: Take 1 tablet by mouth once daily   solifenacin (VESICARE) 10 MG tablet   No No   Sig: Take 1 tablet (10 mg total) by mouth daily   tolterodine (DETROL LA) 4 mg 24 hr capsule   No No   Sig: Take 1 capsule (4 mg total) by mouth daily      Facility-Administered Medications: None     Patient's Medications   Discharge Prescriptions    No medications on file     No discharge procedures on file.  ED SEPSIS DOCUMENTATION   Time reflects when diagnosis was documented in both MDM as applicable and the Disposition within this note       Time User Action Codes Description Comment    7/16/2025  8:01 PM Shakira Martinez Add [R55] Near syncope     7/16/2025  8:01 PM Shakira Martinez Add [R94.31] Prolonged Q-T interval on ECG                    [1]   Past Medical History:  Diagnosis Date    Anxiety     Depression     Disease of thyroid gland     Hyperlipidemia     Stroke (HCC)     Visual impairment    [2]   Past Surgical History:  Procedure Laterality Date    EYE SURGERY      cosmetic   [3]   Family History  Problem Relation Name Age of Onset    Depression Maternal Uncle Asael Anthony     Diabetes Maternal Uncle Asael Anthony     Mental illness Maternal Uncle Asael Anthony         Killed himself was 26    Completed Suicide  Maternal  Uncle Asael Anthony         Had infantile diabetes    Glaucoma Sister Diana Echeverria    [4]   Social History  Tobacco Use    Smoking status: Never     Passive exposure: Past    Smokeless tobacco: Never   Vaping Use    Vaping status: Never Used   Substance Use Topics    Alcohol use: Yes     Comment: Occasionally with meal    Drug use: Never        KATE Villela  07/16/25 2554

## 2025-07-17 PROBLEM — R94.31 T WAVE INVERSION IN EKG: Status: ACTIVE | Noted: 2025-07-17

## 2025-07-17 PROBLEM — R94.31 PROLONGED QT INTERVAL: Status: ACTIVE | Noted: 2025-07-17

## 2025-07-17 LAB
ANION GAP SERPL CALCULATED.3IONS-SCNC: 7 MMOL/L (ref 4–13)
BUN SERPL-MCNC: 16 MG/DL (ref 5–25)
CALCIUM SERPL-MCNC: 8.5 MG/DL (ref 8.4–10.2)
CHLORIDE SERPL-SCNC: 109 MMOL/L (ref 96–108)
CO2 SERPL-SCNC: 24 MMOL/L (ref 21–32)
CREAT SERPL-MCNC: 0.97 MG/DL (ref 0.6–1.3)
GFR SERPL CREATININE-BSD FRML MDRD: 56 ML/MIN/1.73SQ M
GLUCOSE P FAST SERPL-MCNC: 114 MG/DL (ref 65–99)
GLUCOSE SERPL-MCNC: 114 MG/DL (ref 65–140)
MAGNESIUM SERPL-MCNC: 2.3 MG/DL (ref 1.9–2.7)
POTASSIUM SERPL-SCNC: 3.7 MMOL/L (ref 3.5–5.3)
SODIUM SERPL-SCNC: 140 MMOL/L (ref 135–147)

## 2025-07-17 PROCEDURE — 83735 ASSAY OF MAGNESIUM: CPT

## 2025-07-17 PROCEDURE — 99233 SBSQ HOSP IP/OBS HIGH 50: CPT | Performed by: STUDENT IN AN ORGANIZED HEALTH CARE EDUCATION/TRAINING PROGRAM

## 2025-07-17 PROCEDURE — 80048 BASIC METABOLIC PNL TOTAL CA: CPT

## 2025-07-17 PROCEDURE — 93005 ELECTROCARDIOGRAM TRACING: CPT

## 2025-07-17 PROCEDURE — 97163 PT EVAL HIGH COMPLEX 45 MIN: CPT

## 2025-07-17 PROCEDURE — 97167 OT EVAL HIGH COMPLEX 60 MIN: CPT

## 2025-07-17 RX ORDER — AMLODIPINE BESYLATE 5 MG/1
5 TABLET ORAL DAILY
Status: DISCONTINUED | OUTPATIENT
Start: 2025-07-17 | End: 2025-07-18

## 2025-07-17 RX ORDER — HYDROCHLOROTHIAZIDE 12.5 MG/1
12.5 TABLET ORAL DAILY
Status: DISCONTINUED | OUTPATIENT
Start: 2025-07-17 | End: 2025-07-18

## 2025-07-17 RX ORDER — LOSARTAN POTASSIUM 25 MG/1
25 TABLET ORAL DAILY
Status: DISCONTINUED | OUTPATIENT
Start: 2025-07-17 | End: 2025-07-18

## 2025-07-17 RX ADMIN — OXYBUTYNIN CHLORIDE 10 MG: 5 TABLET, EXTENDED RELEASE ORAL at 08:06

## 2025-07-17 RX ADMIN — LEVOTHYROXINE SODIUM 25 MCG: 0.03 TABLET ORAL at 06:00

## 2025-07-17 RX ADMIN — ENOXAPARIN SODIUM 40 MG: 40 INJECTION SUBCUTANEOUS at 08:06

## 2025-07-17 RX ADMIN — HYDROCHLOROTHIAZIDE 12.5 MG: 12.5 TABLET ORAL at 09:55

## 2025-07-17 RX ADMIN — LOSARTAN POTASSIUM 25 MG: 25 TABLET, FILM COATED ORAL at 09:55

## 2025-07-17 RX ADMIN — AMLODIPINE BESYLATE 5 MG: 5 TABLET ORAL at 14:16

## 2025-07-17 RX ADMIN — PANTOPRAZOLE SODIUM 40 MG: 40 TABLET, DELAYED RELEASE ORAL at 06:00

## 2025-07-17 NOTE — H&P
"H&P - Hospitalist   Name: Maryann Sampson 74 y.o. female I MRN: [id number]  Unit/Bed#: ED-39 I Date of Admission: 7/16/2025   Date of Service: 7/16/2025 I Hospital Day: 0     Assessment & Plan  Postural dizziness with presyncope  Presents with near syncope while outside.   Labs: Mg 1.8, Cr 1.31  Imaging: CXR wet read no acute cardiopulmonary disease, EKG NSR HR 75 qtc 522    Plan:  Encourage PO hydration   Maintain telemetry overnight  Orthostatics   Prolonged QT interval  Noted, likely incidental findings, mild hypomagnesemia, on citalopram   EKG NSR HR 75 qtc 522    Plan:  Replenish magnesium   Hold citalopram for now   Avoid qt prolonging agents   Repeat EKG in AM   Type 2 diabetes mellitus with chronic kidney disease, without long-term current use of insulin, unspecified CKD stage (HCC)  Lab Results   Component Value Date    HGBA1C 6.2 (H) 01/15/2025   No results for input(s): \"POCGLU\" in the last 72 hours.  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  Code Status: No Order   Discussion with family:   Anticipated Length of Stay: Patient will be admitted on an observation basis with an anticipated length of stay of less than 2 midnights secondary to presyncope.    History of Present Illness   Chief Complaint:   Chief Complaint   Patient presents with    " Fall     Pt was shopping at [employer], walked outside and had near syncope. States she felt like she was going to pass out and had a fall on the cement in the parking. -headstrike. Abrasion noted to right elbow. Pt reports she did not eat much today.      Maryann Sampson is a 74 y.o. female with a PMH of anxiety, T2DM, HLD, CKD3 who presents with fall.   History obtained from patient, chart review and discussion with ED DAMON. On exam she was laying bed, eating pretzels. Presents tonight for evaluation of fall. She was lots of errands today. She didn't eat or drink much in the morning prior. When unloading groceries from her cart into car she suddenly felt lightheaded and like she was going to pass out. Her legs went to jello and lowered herself to the ground. Did hit her right arm off something but did not hit head. No loss of consciousness. Has been feeling better since being in the ED. Compliant with prescribed medications with no recent changes. No tobacco, alcohol or illicit drug use.     Review of Systems   Constitutional:  Negative for chills and fever.   Respiratory:  Negative for shortness of breath.    Cardiovascular:  Negative for chest pain and palpitations.   Gastrointestinal:  Negative for abdominal pain, diarrhea, nausea and vomiting.   Neurological:  Positive for weakness. Negative for syncope.   All other systems reviewed and are negative.      Historical Information   Past Medical History[1]  Past Surgical History[2]  Social History[3]  E-Cigarette/Vaping    E-Cigarette Use Never User      E-Cigarette/Vaping Substances    Nicotine No     THC No     CBD No     Flavoring No     Other No     Unknown No      Family History[4]  Social History:  Marital Status: Single   Occupation:   Patient Pre-hospital Living Situation: Home  Patient Pre-hospital Level of Mobility: walks  Patient Pre-hospital Diet Restrictions:     Meds/Allergies   I have reviewed home medications with patient personally.  Prior to Admission  medications    Medication Sig Start Date End Date Taking? Authorizing Provider   tolterodine (DETROL LA) 4 mg 24 hr capsule Take 1 capsule (4 mg total) by mouth daily 5/7/25   Winnie Stanley,    ALPRAZolam (XANAX) 0.25 mg tablet Take 1 tablet (0.25 mg total) by mouth 3 (three) times a day as needed for anxiety 9/18/24   Winnie Stanley,    Biotin 5000 MCG CAPS Take by mouth    Historical Provider, MD   cholecalciferol (VITAMIN D3) 1,000 units tablet Take 1,000 Units by mouth daily    Historical Provider, MD   citalopram (CeleXA) 20 mg tablet TAKE 1 & 1/2 (ONE & ONE-HALF) TABLETS BY MOUTH ONCE DAILY 5/1/25   Winnie Stanley,    Cyanocobalamin (Vitamin B 12) 500 MCG TABS Take 1,000 mg by mouth daily    Historical Provider, MD   levothyroxine 25 mcg tablet Take 1 tablet by mouth once daily 4/4/25   Winnie Stanley, DO   pantoprazole (PROTONIX) 40 mg tablet TAKE 1 TABLET BY MOUTH ONCE DAILY BEFORE BREAKFAST 3/3/25   Winnie Stanley, DO   rosuvastatin (CRESTOR) 10 MG tablet Take 1 tablet by mouth once daily 10/1/24   Winnie Stanley, DO   solifenacin (VESICARE) 10 MG tablet Take 1 tablet (10 mg total) by mouth daily 2/3/25   Winnie Stanley, DO     Allergies   Allergen Reactions    Statins      myalgia       Objective :  Temp:  [98.2 °F (36.8 °C)] 98.2 °F (36.8 °C)  HR:  [66-71] 69  BP: (144-152)/(70-78) 152/78  Resp:  [17-18] 17  SpO2:  [98 %] 98 %  O2 Device: None (Room air)    Physical Exam  Vitals and nursing note reviewed.   Constitutional:       General: She is not in acute distress.     Appearance: She is well-developed.   HENT:      Head: Normocephalic and atraumatic.     Eyes:      Conjunctiva/sclera: Conjunctivae normal.       Cardiovascular:      Rate and Rhythm: Normal rate and regular rhythm.      Heart sounds: No murmur heard.  Pulmonary:      Effort: Pulmonary effort is normal. No respiratory distress.      Breath sounds: Normal breath sounds.    Abdominal:      Palpations: Abdomen is soft.      Tenderness: There is no abdominal tenderness.     Musculoskeletal:         General: No swelling.      Cervical back: Neck supple.     Skin:     General: Skin is warm and dry.      Capillary Refill: Capillary refill takes less than 2 seconds.     Neurological:      Mental Status: She is alert.     Psychiatric:         Mood and Affect: Mood normal.          Lines/Drains:            Lab Results: I have reviewed the following results:  Results from last 7 days   Lab Units 07/16/25  1802   WBC Thousand/uL 7.75   HEMOGLOBIN g/dL 12.6   HEMATOCRIT % 37.9   PLATELETS Thousands/uL 200   SEGS PCT % 81*   LYMPHO PCT % 11*   MONO PCT % 6   EOS PCT % 0     Results from last 7 days   Lab Units 07/16/25  1802   SODIUM mmol/L 143   POTASSIUM mmol/L 3.6   CHLORIDE mmol/L 110*   CO2 mmol/L 21   BUN mg/dL 16   CREATININE mg/dL 1.31*   ANION GAP mmol/L 12   CALCIUM mg/dL 8.9   GLUCOSE RANDOM mg/dL 130             Lab Results   Component Value Date    HGBA1C 6.2 (H) 01/15/2025    HGBA1C 6.4 (H) 08/29/2024    HGBA1C 6.8 (H) 03/13/2024           Imaging Results Review: I personally reviewed the following image studies in PACS and associated radiology reports: chest xray. My interpretation of the radiology images/reports is: no acute cardiopulmonary disease.  Other Study Results Review: EKG was personally reviewed and my interpretation is: NSR. HR 75..    Administrative Statements     ** Please Note: This note has been constructed using a voice recognition system. **         [1]   Past Medical History:  Diagnosis Date    Anxiety     Depression     Disease of thyroid gland     Hyperlipidemia     Stroke (HCC)     Visual impairment    [2]   Past Surgical History:  Procedure Laterality Date    EYE SURGERY      cosmetic   [3]   Social History  Tobacco Use    Smoking status: Never     Passive exposure: Past    Smokeless tobacco: Never   Vaping Use    Vaping status: Never Used   Substance and  Sexual Activity    Alcohol use: Yes     Comment: Occasionally with meal    Drug use: Never    Sexual activity: Not Currently     Birth control/protection: Abstinence   [4]   Family History  Problem Relation Name Age of Onset    Depression Maternal Uncle Asael Anthony     Diabetes Maternal Uncle Asael Anthony     Mental illness Maternal Uncle Asael Anthony         Killed himself was 26    Completed Suicide  Maternal Uncle Asael Anthony         Had infantile diabetes    Glaucoma Sister Diana Echeverria

## 2025-07-17 NOTE — ASSESSMENT & PLAN NOTE
-Presents with near syncope while outside.   -Labs: Mg 1.8, Cr 1.31  -Imaging: CXR wet read no acute cardiopulmonary disease, EKG NSR HR 75 qtc 522  -Orthostatic wnl  -Noted severe asymptomatic hypertension, sBP 170-190mmHg  Plan  -start losartan HCTZ  -start amlodipine  -VS qS

## 2025-07-17 NOTE — CASE MANAGEMENT
Case Management Assessment & Discharge Planning Note    Patient name Maryann Sampson  Location S /S -01 MRN [id number]  : 1950 Date 2025       Current Admission Date: 2025  Current Admission Diagnosis:Postural dizziness with presyncope   Patient Active Problem List    Diagnosis Date Noted    Prolonged QT interval 2025    Postural dizziness with presyncope 2025    History of subdural hematoma 2023    Benign essential tremor 2023    Neurologic gait dysfunction 2023    Stage 3a chronic kidney disease (HCC) 10/19/2022    Other specified hypothyroidism 2021    Overactive bladder 2021    Recurrent major depressive disorder, in full remission (HCC) 2021    Obesity, morbid ([facility]) 2021    Hyperlipidemia 2020    Depression with anxiety 2020    Type 2 diabetes mellitus with chronic kidney disease, without long-term current use of insulin, unspecified CKD stage ([facility])       LOS (days): 0  Geometric Mean LOS (GMLOS) (days):   Days to GMLOS:     OBJECTIVE:              Current admission status: Observation       Preferred Pharmacy:   [facility] [address] [place], [place] [address]  Phone: [phone] Fax: [phone]    [facility] [address] [place] [address] [place] - [address] [address]  Phone: [phone] Fax: [phone]    Primary Care Provider: Winnie Stanley DO    Primary Insurance: [employer] [facility] REP  Secondary Insurance:     ASSESSMENT:  Active Health Care Proxies    There are no active Health Care Proxies on file.                 Readmission Root Cause  30 Day Readmission: No    Patient Information  Admitted from:: Home  Mental Status: Alert  During Assessment patient was accompanied by: Not accompanied during assessment  Assessment information provided by:: Patient  Primary Caregiver: Self  Support Systems: Self, Family members,  Friend  County of Residence: [place]  [facility] do you live in[place]  Home entry access options. Select all that apply.: Stairs  Number of steps to enter home.: 2  Type of Current Residence: 2 story home  Upon entering residence, is there a bedroom on the main floor (no further steps)?: Yes  Upon entering residence, is there a bathroom on the main floor (no further steps)?: Yes  Living Arrangements: Lives Alone    Activities of Daily Living Prior to Admission  Functional Status: Independent  Completes ADLs independently?: Yes  Ambulates independently?: Yes  Does patient use assisted devices?: Yes  Assisted Devices (DME) used: Straight Cane, Shower Chair  Does patient currently own DME?: Yes  What DME does the patient currently own?: Straight Cane, Walker, Rollator, Shower Chair  Does patient have a history of Outpatient Therapy (PT/OT)?: Yes  Does the patient have a history of Short-Term Rehab?: No  Does patient have a history of HHC?: No  Does patient currently have HHC?: No    Current Home Health Care  Home Health Agency Name:: Other    Patient Information Continued  Income Source: Pension/FPC  Does patient have prescription coverage?: Yes  Can the patient afford their medications and any related supplies (such as glucometers or test strips)?: Yes  Does patient receive dialysis treatments?: No  Does patient have a history of substance abuse?: No  Does patient have a history of Mental Health Diagnosis?: Yes  Is patient receiving treatment for mental health?: Yes  Has patient received inpatient treatment related to mental health in the last 2 years?: No         Means of Transportation  Means of Transport to Appts:: Drives Self          DISCHARGE DETAILS:    Discharge planning discussed with:: Patient  Freedom of Choice: Yes  Comments - Freedom of Choice: Preference to return home with HHC - PT/OT pending  CM contacted family/caregiver?: No- see comments (Pt A&O, declined)  Were Treatment Team  discharge recommendations reviewed with patient/caregiver?: Yes  Did patient/caregiver verbalize understanding of patient care needs?: Yes  Were patient/caregiver advised of the risks associated with not following Treatment Team discharge recommendations?: Yes    Contacts  Patient Contacts: Patient  Contact Method: In Person  Reason/Outcome: Continuity of Care, Discharge Planning, Referral    Requested Home Health Care         Is the patient interested in HHC at discharge?: Yes  Home Health Discipline requested:: Occupational Therapy, Physical Therapy, Nursing  Home Health Agency Name:: Other  Home Health Follow-Up Provider:: PCP  Home Health Services Needed:: Evaluate Functional Status and Safety, Gait/ADL Training, Strengthening/Theraputic Exercises to Improve Function  Homebound Criteria Met:: Uses an Assist Device (i.e. cane, walker, etc), Requires the Assistance of Another Person for Safe Ambulation or to Leave the Home  Supporting Clincal Findings:: Fatigues Easliy in Short Distances, Limited Endurance    DME Referral Provided  Referral made for DME?: No    Other Referral/Resources/Interventions Provided:  Interventions: HHC, Other (Specify)  Referral Comments: CM received consult for SDOH. CM spoke with pt at bedside, introduced self and role with dcp. CM provided SDOH resources. Pt reported she lives alone in a 2 story home with 2 DEVONTE. Pt reported she has a FFSU as there is an apartment on the 2nd floor. Pt reported being IPTA. Pt reported she has acane, RW, rolaltor, and shower chair. Pt reported preference is to return home with HHC, aware PT/OT recs are pending. CM sent referrals for HHC via [employer].    Would you like to participate in our [facility] service program?  : No - Declined    Treatment Team Recommendation: Other (Pending PT/OT)  Expected Discharge Disposition: Home Health Services  Additional Discharge Dispositions: Home Health Services

## 2025-07-17 NOTE — ASSESSMENT & PLAN NOTE
Noted, likely incidental findings, mild hypomagnesemia, on citalopram   EKG NSR HR 75 qtc 522    Plan:  Replenish magnesium   Hold citalopram for now   Avoid qt prolonging agents   Repeat EKG in AM

## 2025-07-17 NOTE — ASSESSMENT & PLAN NOTE
Presents with near syncope while outside.   Labs: Mg 1.8, Cr 1.31  Imaging: CXR wet read no acute cardiopulmonary disease, EKG NSR HR 75 qtc 522    Plan:  Encourage PO hydration   Maintain telemetry overnight  Orthostatics

## 2025-07-17 NOTE — PROGRESS NOTES
"Progress Note - Hospitalist   Name: Maryann Sampson 74 y.o. female I MRN: [id number]  Unit/Bed#: S -01 I Date of Admission: 7/16/2025   Date of Service: 7/17/2025 I Hospital Day: 0     Assessment & Plan  Postural dizziness with presyncope  -Presents with near syncope while outside.   -Labs: Mg 1.8, Cr 1.31  -Imaging: CXR wet read no acute cardiopulmonary disease, EKG NSR HR 75 qtc 522  -Orthostatic wnl  -Noted severe asymptomatic hypertension, sBP 170-190mmHg  Plan  -start losartan HCTZ  -start amlodipine  -VS qS   Prolonged QT interval  Noted, likely incidental findings, mild hypomagnesemia, on citalopram   EKG NSR HR 75 qtc 522  Qtc normalized on repeat ecg.   -can resume citalopram.     Type 2 diabetes mellitus with chronic kidney disease, without long-term current use of insulin, unspecified CKD stage (HCC)  Lab Results   Component Value Date    HGBA1C 6.2 (H) 01/15/2025   No results for input(s): \"POCGLU\" in the last 72 hours.  Blood Sugar Average: Last 72 hrs:    Well controlled  Outpatient regimen: none    Plan:  Monitor BG if remains hospitalized  Stage 3a chronic kidney disease (HCC)  Lab Results   Component Value Date    EGFR 56 07/17/2025    EGFR 39 07/16/2025    EGFR 45 01/15/2025    CREATININE 0.97 07/17/2025    CREATININE 1.31 (H) 07/16/2025    CREATININE 1.17 01/15/2025     CKD in setting of age-related, hypertensive nephropathy  Baseline Cr: 1.0-1.2  Admit Cr: 1.31   At baseline  T wave inversion in EKG  -TWI discussed with cardiology - have been present previously, no chest pain; defer workup.     24 Hour Events :   Admitted ovn  Subjective :   No acute complaints  She would like to go home.  She denies palps, cp   Denies dizziness with postural changes  Yesterday did not eat or drink anything and was \"on the go\" all day until she passed out in parking lot; bystander called EMS     Objective :  Temp:  [97.9 °F (36.6 °C)-98.3 °F (36.8 °C)] 97.9 °F (36.6 °C)  HR:  [65-71] 70  BP: (144-192)/(70-96) " 176/88  Resp:  [17-18] 17  SpO2:  [93 %-98 %] 93 %  O2 Device: None (Room air)    Physical Exam  Constitutional:       General: She is not in acute distress.     Appearance: Normal appearance. She is obese. She is not ill-appearing, toxic-appearing or diaphoretic.   HENT:      Head: Normocephalic and atraumatic.      Mouth/Throat:      Mouth: Mucous membranes are moist.      Pharynx: Oropharynx is clear.      Comments: Ecchymosis, abrasion to right upper lip    Eyes:      Extraocular Movements: Extraocular movements intact.      Pupils: Pupils are equal, round, and reactive to light.       Cardiovascular:      Rate and Rhythm: Normal rate and regular rhythm.      Pulses: Normal pulses.      Heart sounds: No murmur heard.     No gallop.   Pulmonary:      Effort: Pulmonary effort is normal. No respiratory distress.      Breath sounds: No wheezing or rales.   Abdominal:      General: Abdomen is flat.      Palpations: Abdomen is soft.     Musculoskeletal:         General: No swelling.      Right lower leg: No edema.      Left lower leg: No edema.     Skin:     General: Skin is warm and dry.     Neurological:      General: No focal deficit present.      Mental Status: She is alert and oriented to person, place, and time.         Lab Results: I have reviewed the following results:    Recent Labs     07/16/25  1802 07/17/25  0503   WBC 7.75  --    HGB 12.6  --    HCT 37.9  --      --    SODIUM 143 140   K 3.6 3.7   CO2 21 24   CREATININE 1.31* 0.97   GLUC 130 114   CALCIUM 8.9 8.5   MG 1.8* 2.3         Imaging Results Review: I reviewed radiology reports from this admission including: chest xray.  Other Study Results Review: EKG was personally reviewed and my interpretation is: NSR precordial TWI, qtc 497ms..    VTE Pharmacologic Prophylaxis: VTE covered by:  enoxaparin, Subcutaneous, 40 mg at 07/17/25 0806     VTE Mechanical Prophylaxis: sequential compression device    Administrative Statements   I have spent a  total time of 60 minutes in caring for this patient on the day of the visit/encounter including Diagnostic results, Prognosis, Risks and benefits of tx options, Instructions for management, Patient and family education, Importance of tx compliance, Risk factor reductions, Impressions, Counseling / Coordination of care, Documenting in the medical record, Reviewing/placing orders in the medical record (including tests, medications, and/or procedures), Obtaining or reviewing history  , and Communicating with other healthcare professionals .

## 2025-07-17 NOTE — PLAN OF CARE
Problem: PHYSICAL THERAPY ADULT  Goal: Performs mobility at highest level of function for planned discharge setting.  See evaluation for individualized goals.  Description: Treatment/Interventions: Functional transfer training, LE strengthening/ROM, Therapeutic exercise, Endurance training, Patient/family training, Equipment eval/education, Bed mobility, Gait training, Compensatory technique education          See flowsheet documentation for full assessment, interventions and recommendations.  Note:    Problem List: Decreased strength, Decreased endurance, Impaired balance, Decreased mobility, Decreased safety awareness  Assessment: Pt was running lots of errands. She didn't eat or drink much in the morning prior. When unloading groceries from her cart into car she suddenly felt lightheaded and like she was going to pass out. Her legs went to jello and lowered herself to the ground. Dx: postural dizziness, presyncope, prolonged QT interval, DM, and CKD. order placed for PT eval and tx, w/ activity order of out of bed to chair. pt presents w/ comorbidities of DM, hyperlipidemia, CVA, and CKD and personal factors of anxiety, mobilizing w/ assistive device, limited home support, positive fall history, and depression. pt presents w/ weakness, decreased endurance, impaired balance, gait deviations, decreased safety awareness, and fall risk. these impairments are evident in findings from physical examination (weakness), mobility assessment (need for standby to min assist w/ all phases of mobility when usually mobilizing independently, tolerance to only 90 feet of ambulation, and need for cueing for mobility technique), and Barthel Index: 70/100. pt needed input for task focus and mobility technique. pt is at risk for falls due to physical and safety awareness deficits. pt's clinical presentation is unstable/unpredictable (evident in poor blood pressure control, need for assist w/ all phases of mobility when usually  mobilizing independently, tolerance to only 90 feet of ambulation, and need for input for task focus and mobility technique). pt needs inpatient PT tx to improve mobility deficits and progress mobility training as appropriate. Pt shows greatest improvement in mobility status w/ use of roller walker - recommend continued ambulation w/ walker at home (pt was notified of recommendation).        Rehab Resource Intensity Level, PT: III (Minimum Resource Intensity)    See flowsheet documentation for full assessment.

## 2025-07-17 NOTE — ASSESSMENT & PLAN NOTE
Lab Results   Component Value Date    EGFR 39 07/16/2025    EGFR 45 01/15/2025    EGFR 51 08/29/2024    CREATININE 1.31 (H) 07/16/2025    CREATININE 1.17 01/15/2025    CREATININE 1.06 08/29/2024     CKD in setting of age-related, hypertensive nephropathy  Baseline Cr: 1.0-1.2  Admit Cr: 1.31     Plan:  Monitor renal function, renal dose medications, maintain normotension/euvolemia, avoid nephrotoxic agents, I&Os

## 2025-07-17 NOTE — ASSESSMENT & PLAN NOTE
"Lab Results   Component Value Date    HGBA1C 6.2 (H) 01/15/2025   No results for input(s): \"POCGLU\" in the last 72 hours.  Blood Sugar Average: Last 72 hrs:    Well controlled  Outpatient regimen: none    Plan:  Monitor BG if remains hospitalized  "

## 2025-07-17 NOTE — CASE MANAGEMENT
Case Management Discharge Planning Note    Patient name Maryann Sampson  [facility] S /S -01 MRN [id number]  : 1950 Date 2025       Current Admission Date: 2025  Current Admission Diagnosis:Postural dizziness with presyncope   Patient Active Problem List    Diagnosis Date Noted    Prolonged QT interval 2025    Postural dizziness with presyncope 2025    History of subdural hematoma 2023    Benign essential tremor 2023    Neurologic gait dysfunction 2023    Stage 3a chronic kidney disease (HCC) 10/19/2022    Other specified hypothyroidism 2021    Overactive bladder 2021    Recurrent major depressive disorder, in full remission (HCC) 2021    Obesity, morbid (HCC) 2021    Hyperlipidemia 2020    Depression with anxiety 2020    Type 2 diabetes mellitus with chronic kidney disease, without long-term current use of insulin, unspecified CKD stage (HCC)       LOS (days): 0  Geometric Mean LOS (GMLOS) (days):   Days to GMLOS:     OBJECTIVE:            Current admission status: Inpatient   Preferred Pharmacy:   [facility] [address] [place], [place] [address]  Phone: [phone] Fax: [phone]    [facility] [place] - [address] [address]  Phone: [phone] Fax: [phone]    Primary Care Provider: Winnie Stanley DO    Primary Insurance: [employer] [facility] REP  Secondary Insurance:     DISCHARGE DETAILS:    Discharge planning discussed with:: Patient  Freedom of Choice: Yes  Comments - Freedom of Choice: Home w/ HHC  CM contacted family/caregiver?: Yes (Sister present at bedside)  Were Treatment Team discharge recommendations reviewed with patient/caregiver?: Yes  Did patient/caregiver verbalize understanding of patient care needs?: Yes  Were patient/caregiver advised of the risks associated with not following Treatment  Team discharge recommendations?: Yes    Contacts  Patient Contacts: Patient, sister  Contact Method: In Person  Reason/Outcome: Continuity of Care, Emergency Contact, Referral, Discharge Planning    Other Referral/Resources/Interventions Provided:  Interventions: HHC  Referral Comments: CM spoke with pt and sister at bedside to f/u on dcp. CM reviewed PT/OT rec level 3. CM provided HHC choice list for review. Pt requesting some time to review HHC choices with sister. CM will continue to follow.    Treatment Team Recommendation: Home with Home Health Care  Expected Discharge Disposition: Home Health Services  Additional Discharge Dispositions: Home Health Services  Transport at Discharge : Family

## 2025-07-17 NOTE — PLAN OF CARE
Problem: OCCUPATIONAL THERAPY ADULT  Goal: Performs self-care activities at highest level of function for planned discharge setting.  See evaluation for individualized goals.  Description: Treatment Interventions: ADL retraining, Functional transfer training, Endurance training, Patient/family training, Equipment evaluation/education, Neuromuscular reeducation          See flowsheet documentation for full assessment, interventions and recommendations.   Note: Limitation: Decreased ADL status, Decreased cognition, Decreased endurance, Decreased self-care trans, Decreased high-level ADLs (executive functioning, balance , safety awareness)  Prognosis: Good  Assessment: Patient is a 74 y.o. female seen for OT evaluation at [facility] following admission on 7/16/2025  s/p Postural dizziness with presyncope. Please see above for comprehensive list of comorbidities and significant PMHx impacting functional performance.  Upon initial evaluation, pt appears to be performing below baseline functional status.   Occupational performance is affected by the following deficits: decreased balance , decreased trunk control , decreased activity tolerance , impaired judgement and problem solving , and impaired safety awareness . Personal/Environmental factors impacting D/C include: (+) Hx of falls , lives alone. Patient would benefit from OT services within the acute care setting to maximize level of functional independence in the following areas self-care transfers, functional mobility, and ADLs.  From OT standpoint, recommendation at time of D/C would be Level 3: minimum resource intensity .     Rehab Resource Intensity Level, OT: III (Minimum Resource Intensity)   Shelly Dobbs OT

## 2025-07-17 NOTE — ASSESSMENT & PLAN NOTE
Noted, likely incidental findings, mild hypomagnesemia, on citalopram   EKG NSR HR 75 qtc 522  Qtc normalized on repeat ecg.   -can resume citalopram.

## 2025-07-17 NOTE — UTILIZATION REVIEW
Was OBSERVATION 7/16/25 @ 2111 converted to INPATIENT admission 7/17/25 @ 1414 due to continued stay required to care for patient with HTN  requiring close monitoring of BP and med adjustments.      Initial Clinical Review    Admission: Date/Time/Statement:   Admission Orders (From admission, onward)       Ordered        07/17/25 1414  INPATIENT ADMISSION  Once            07/16/25 2111  Place in Observation  Once                          Orders Placed This Encounter   Procedures    INPATIENT ADMISSION     Standing Status:   Standing     Number of Occurrences:   1     Level of Care:   Med Surg [16]     Estimated length of stay:   More than 2 Midnights     Certification:   I certify that inpatient services are medically necessary for this patient for a duration of greater than two midnights. See H&P and MD Progress Notes for additional information about the patient's course of treatment.     ED Arrival Information       Expected   -    Arrival   7/16/2025 17:39    Acuity   Urgent              Means of arrival   Ambulance    Escorted by   [facility]    Service   Hospitalist    Admission type   Emergency              Arrival complaint   EMS; SYNCOPE; vss             Chief Complaint   Patient presents with    Fall     Pt was shopping at [employer], walked outside and had near syncope. States she felt like she was going to pass out and had a fall on the cement in the parking. -headstrike. Abrasion noted to right elbow. Pt reports she did not eat much today.        Initial Presentation: 74 y.o. female  with a PMH of anxiety, Stroke, T2DM, HLD, CKD3, who presented by EMS to [facility] ED. Admitted as Observation for evaluation and treatment of Postural dizziness w/ presyncope. Prolonged QT interval.  Hypertension.     Presented w/ s/p fall tonight after running errands and not eating or drinking much in the morning prior. When unloading groceries from her cart into the care, pt suddenly felt  lightheaded as if she was going to pass out and she lowered herself to the ground. Reports hit her arm on something but did not hit her head. No LOC.  On exam, No focal deficit. Labs K 3.6, Creat 1.31, Mg 1.8. EKG NSR HR 75 qtc 522 - noted to have prolonged QT interval- incidental findings, mild hypomagnesemia, on citalopram     Plan: Encourage PO hydration, Maintain on tele overnight, Orthostatics. Mg given in the ED. Hold Citalopram for now. Avoid QT prolonging agents, Repeat EKG in am.      Date: 7/17/25   Day 2: BED STATUS CHANGED TO INPATIENT  Pt denies any acute complaints. Denies dizziness w/ postural changes. Orthostatic BP WNL. Noted to have severe asymptomatic hypertension -190mmHg. On exam, ecchymosis, abrasion to R upper lip. Labs: K 3.7, Mg 2.3. Plan: Start Losartan HCTZ, Start Amlodipine, Qtc normalized on repeat ECG - can resume citalopram.     Date: 7/18/25  Day 3: Has surpassed a 2nd midnight with active treatments and services.  /89 this morning.  Norvasc and Losartan dose increased. Pt stable for discharge.       ED Treatment-Medication Administration from 07/16/2025 1739 to 07/17/2025 0216         Date/Time Order Dose Route Action     07/16/2025 1815 neomycin-bacitracin-polymyxin b (NEOSPORIN) ointment 1 small application 1 small application Topical Given     07/16/2025 1817 tetanus-diphtheria-acellular pertussis (BOOSTRIX) IM injection 0.5 mL 0.5 mL Intramuscular Given     07/16/2025 1901 magnesium sulfate 2 g/50 mL IVPB (premix) 2 g 2 g Intravenous New Bag            Scheduled Medications:  enoxaparin, 40 mg, Subcutaneous, Daily  hydroCHLOROthiazide, 12.5 mg, Oral, Daily  levothyroxine, 25 mcg, Oral, Early Morning  oxybutynin, 10 mg, Oral, Daily  pantoprazole, 40 mg, Oral, Daily Before Breakfast  amLODIPine (NORVASC) tablet 5 mg  Dose: 5 mg  Freq: Daily Route: PO  Start: 07/17/25 1415 End: 07/18/25 0828  amLODIPine (NORVASC) tablet 10 mg  Dose: 10 mg  Freq: Daily Route: PO  Start:  07/18/25 0900   citalopram (CeleXA) tablet 30 mg  Dose: 30 mg  Freq: Daily Route: PO  Start: 07/17/25 0900      losartan (COZAAR) tablet 25 mg  Dose: 25 mg  Freq: Daily Route: PO  Start: 07/17/25 0945 End: 07/18/25 0828      losartan (COZAAR) tablet 50 mg  Dose: 50 mg  Freq: Daily Route: PO  Start: 07/18/25 0900    Continuous IV Infusions: NONE     PRN Meds:  acetaminophen, 975 mg, Oral, Q8H PRN  aluminum-magnesium hydroxide-simethicone, 30 mL, Oral, Q6H PRN  polyethylene glycol, 17 g, Oral, Daily PRN  trimethobenzamide, 200 mg, Intramuscular, Q6H PRN      ED Triage Vitals [07/16/25 1745]   Temperature Pulse Respirations Blood Pressure SpO2 Pain Score   98.2 °F (36.8 °C) 66 18 144/70 98 % 4     Weight (last 2 days)       Date/Time Weight    07/16/25 1745 79.4 (175.05)            Vital Signs (last 3 days)       Date/Time Temp Pulse Resp BP MAP (mmHg) SpO2 O2 Device Patient Position - Orthostatic VS [place]im Coma Scale Score Pain    07/18/25 0945 -- -- -- -- -- -- -- -- 15 No Pain    07/18/25 07:46:18 98 °F (36.7 °C) 61 16 171/89 116 92 % -- -- -- --    07/18/25 0000 -- -- -- -- -- -- -- -- 15 No Pain    07/17/25 23:41:59 98.1 °F (36.7 °C) 59 16 159/80 106 96 % -- -- -- --    07/17/25 20:03:33 98.4 °F (36.9 °C) 64 15 154/71 99 96 % -- -- -- --    07/17/25 15:39:08 98.1 °F (36.7 °C) 65 18 150/70 97 94 % -- -- -- --    07/17/25 1402 -- -- -- -- -- -- -- -- -- No Pain    07/17/25 1345 -- -- -- -- -- -- -- -- -- No Pain    07/17/25 13:16:29 -- 70 17 176/88 117 93 % -- Standing - Orthostatic VS -- --    07/17/25 13:15:05 -- 68 -- 175/82 113 93 % -- Sitting - Orthostatic VS -- --    07/17/25 13:13:37 -- 70 -- 172/91 118 94 % -- Lying - Orthostatic VS -- --    07/17/25 0909 -- -- -- 192/92 -- -- -- Standing for 3 minutes - Orthostatic VS -- --    07/17/25 0905 -- -- -- 181/88 -- -- -- Standing - Orthostatic VS -- --    07/17/25 0903 -- -- -- 182/96 -- -- -- Sitting - Orthostatic VS -- --    07/17/25 0900 -- -- -- 180/84 -- --  -- Lying - Orthostatic VS -- --    07/17/25 0800 -- -- -- -- -- -- -- -- 15 No Pain    07/17/25 07:03:55 97.9 °F (36.6 °C) 65 17 145/88 107 97 % -- -- -- --    07/17/25 0407 -- -- -- -- -- -- None (Room air) -- 15 No Pain    07/17/25 02:25:03 98.3 °F (36.8 °C) 67 -- 181/93 122 96 % -- -- -- No Pain    07/16/25 2200 -- -- -- -- -- -- -- -- 15 --    07/16/25 2109 -- 69 17 152/78 -- 98 % None (Room air) Sitting -- --    07/16/25 2052 -- 71 18 -- -- -- -- -- -- --    07/16/25 1747 -- -- -- -- -- -- -- -- 15 --    07/16/25 1745 98.2 °F (36.8 °C) 66 18 144/70 100 98 % None (Room air) Sitting -- 4              Pertinent Labs/Diagnostic Test Results:   Radiology:  XR chest 1 view portable   ED Interpretation by KATE Villela (07/16 1937)   No pneumonia noted on my interpretation of x-ray.       Final Interpretation by Javier Gallegos MD (07/17 1149)      No acute cardiopulmonary disease.            Resident: Justin Child I, the attending radiologist, have reviewed the images and agree with the final report above.      Workstation performed: [id number]           Cardiology:  ECG 12 Lead Documentation Only     Date/Time: 7/16/2025 7:59 PM     Performed by: KATE Villela   Authorized by: KATE Villela    Indications / Diagnosis:  Near syncope   ECG reviewed by me, the ED Provider: yes    Patient location:  ED   Previous ECG:     Previous ECG:  Unavailable   Interpretation:     Interpretation: abnormal    Rate:     ECG rate:  75     ECG rate assessment: normal    Rhythm:     Rhythm: sinus rhythm    Ectopy:     Ectopy: none    QRS:     QRS axis:  Normal     QRS intervals:  Wide   Conduction:     Conduction: normal    ST segments:     ST segments:  Normal   T waves:     T waves: non-specific    Comments:      BJX=[id number]        Results from last 7 days   Lab Units 07/16/25  1802   WBC Thousand/uL 7.75   HEMOGLOBIN g/dL 12.6   HEMATOCRIT % 37.9   PLATELETS Thousands/uL 200   TOTAL  NEUT ABS Thousands/µL 6.31         Results from last 7 days   Lab Units 07/18/25  0454 07/17/25  0503 07/16/25  1802   SODIUM mmol/L 140 140 143   POTASSIUM mmol/L 3.7 3.7 3.6   CHLORIDE mmol/L 107 109* 110*   CO2 mmol/L 26 24 21   ANION GAP mmol/L 7 7 12   BUN mg/dL 17 16 16   CREATININE mg/dL 1.02 0.97 1.31*   EGFR ml/min/1.73sq m 53 56 39   CALCIUM mg/dL 8.8 8.5 8.9   MAGNESIUM mg/dL  --  2.3 1.8*     Results from last 7 days   Lab Units 07/18/25  0454 07/17/25  0503 07/16/25  1802   GLUCOSE RANDOM mg/dL 127 114 130     Results from last 7 days   Lab Units 07/16/25  1802   HS TNI 0HR ng/L <2     Results from last 7 days   Lab Units 07/16/25  1802   TSH 3RD GENERATON uIU/mL 2.598           Past Medical History[1]  Present on Admission:   Postural dizziness with presyncope   Type 2 diabetes mellitus with chronic kidney disease, without long-term current use of insulin, unspecified CKD stage (HCC)   Stage 3a chronic kidney disease (HCC)   Prolonged QT interval      Admitting Diagnosis: Prolonged Q-T interval on ECG [R94.31]  Near syncope [R55]  Age/Sex: 74 y.o. female    Network Utilization Review Department  ATTENTION: Please call with any questions or concerns to [phone] and carefully listen to the prompts so that you are directed to the right person. All voicemails are confidential.   For Discharge needs, contact Care Management DC Support Team at [phone] opt. 2  Send all requests for admission clinical reviews, approved or denied determinations and any other requests to dedicated fax number below belonging to the campus where the patient is receiving treatment. List of dedicated fax numbers for the Facilities:  FACILITY NAME UR FAX NUMBER   ADMISSION DENIALS (Administrative/Medical Necessity) [phone]   DISCHARGE SUPPORT TEAM (NETWORK) [phone]   PARENT CHILD HEALTH (Maternity/NICU/Pediatrics) [phone]   [facility] [phone]   [facility]  [place] [phone]   [facility] [phone]   [facility] [phone]   [facility] [phone]   [facility] [phone]   [facility] [phone]   [facility] [phone]   [facility] [phone]   [facility] [phone]   [facility] [phone]   [facility] [phone]              [1]   Past Medical History:  Diagnosis Date    Anxiety     Depression     Disease of thyroid gland     Hyperlipidemia     Stroke (HCC)     Visual impairment

## 2025-07-17 NOTE — OCCUPATIONAL THERAPY NOTE
Occupational Therapy Evaluation     Patient Name: Maryann Sampson  Today's Date: 7/17/2025  Problem List  Principal Problem:    Postural dizziness with presyncope  Active Problems:    Type 2 diabetes mellitus with chronic kidney disease, without long-term current use of insulin, unspecified CKD stage (HCC)    Stage 3a chronic kidney disease (HCC)    Prolonged QT interval    T wave inversion in EKG    Past Medical History  Past Medical History[1]  Past Surgical History  Past Surgical History[2]          07/17/25 1345   OT Last Visit   OT Visit Date 07/17/25   Note Type   Note type Evaluation   Pain Assessment   Pain Assessment Tool 0-10   Pain Score No Pain   Restrictions/Precautions   Weight Bearing Precautions Per Order No   Other Precautions Chair Alarm;Bed Alarm;Fall Risk   Home Living   Type of Home House   Home Layout Two level;Performs ADLs on one level;Able to live on main level with bedroom/bathroom;Stairs to enter with rails  ([address]. [address] (2nd floor is seperate apartment) laundry on main floor)   Bathroom Shower/Tub Tub/shower unit   Bathroom Toilet Standard   Bathroom Equipment Shower chair;Grab bars in shower;Hand-held shower   Bathroom Accessibility Accessible   Home Equipment Walker;Cane  (RW, rollator)   Additional Comments cane used for community mobility. no AD used for household distances.   Prior Function   Level of [place] Independent with ADLs;Independent with functional mobility;Independent with IADLS   Lives With Alone   Receives Help From Neighbor   IADLs Independent with driving;Independent with meal prep;Independent with medication management  (goes to grocery store vs delivery)   Falls in the last 6 months 1 to 4  (1 syncope episode leading to admission. reports 1 fall in the night going to the bathroom)   Vocational Retired   Lifestyle   Autonomy At baseline pt is (I) c ADLs, IADLs, no AD vs SPC use. Lives alone. + driving, + falls.   Reciprocal Relationships supportive neighbor    Service to Others retired   General   Additional Pertinent History Pt admitted following syncope episode while grocery shopping at [email or web address]. Hx of CKD, and DM2 , neurological gait dysfunction, essential tremor, obesity,   Family/Caregiver Present No   Subjective   Subjective Pt reports feeling better   ADL   Where Assessed Edge of bed   Eating Assistance 7  Independent   Grooming Assistance 6  Modified Independent   UB Bathing Assistance 5  Supervision/Setup   LB Bathing Assistance 5  Supervision/Setup   UB Dressing Assistance 5  Supervision/Setup   LB Dressing Assistance 5  Supervision/Setup   Toileting Assistance  5  Supervision/Setup   Functional Assistance 4  Minimal Assistance   Additional Comments Pt limited at times by standing balance and safety awareness   Bed Mobility   Supine to Sit 5  Supervision   Additional items HOB elevated;Increased time required   Sit to Supine   (seated OOB in recliner at end of session)   Additional Comments Denies lightheaded/dizziness c positional changes   Transfers   Sit to Stand 5  Supervision   Additional items Increased time required   Stand to Sit 5  Supervision   Additional items Increased time required   Additional Comments no AD used during sit>stand transfer, requires cueing during stand > sit transfer to not abandon RW.   Functional Mobility   Functional Mobility 4  Minimal assistance   Additional Comments community distances within hallway. initially no AD, then trialed SPC and RW. improvements with RW but continues to demonstrate multiple gait deviations and small LOBs however self corrects. limited insight to balance deficits. Education for self pacing and not multitasking   Additional items SPC;Rolling walker  (vs no AD)   Balance   Static Sitting Fair +   Dynamic Sitting Fair   Static Standing Fair +  (c RW)   Dynamic Standing Fair   Activity Tolerance   Activity Tolerance Patient limited by fatigue   Medical Staff Made Aware Care coordination c PT NEVAEH Salvador.  "  Nurse Made Aware RN Deepa pre/post   RUE Assessment   RUE Assessment WFL  (MMT 4/5 based on functional assessment)   LUE Assessment   LUE Assessment WFL  (MMT 4/5 based on functional assessment)   Hand Function   Gross Motor Coordination Impaired   Fine Motor Coordination Functional   Sensation   Light Touch No apparent deficits   Vision-Basic Assessment   Current Vision Wears glasses all the time   Visual History Corrective eye surgery;Cataracts   Patient Visual Report   (self reports diplopia after cateract surgery but it \"comes and goes\". reports not present at time of eval)   Cognition   Overall Cognitive Status WFL  (higher level deficits anticipated)   Arousal/Participation Alert;Cooperative   Attention Attends with cues to redirect  (tangential; cues to redirect)   Orientation Level Oriented X4   Memory Decreased recall of precautions   Following Commands Follows one step commands without difficulty   Comments limited safety awareness, insight.   Assessment   Limitation Decreased ADL status;Decreased cognition;Decreased endurance;Decreased self-care trans;Decreased high-level ADLs  (executive functioning, balance , safety awareness)   Prognosis Good   Assessment Patient is a 74 y.o. female seen for OT evaluation at [facility] following admission on 7/16/2025  s/p Postural dizziness with presyncope. Please see above for comprehensive list of comorbidities and significant PMHx impacting functional performance.  Upon initial evaluation, pt appears to be performing below baseline functional status.   Occupational performance is affected by the following deficits: decreased balance , decreased trunk control , decreased activity tolerance , impaired judgement and problem solving , and impaired safety awareness . Personal/Environmental factors impacting D/C include: (+) Hx of falls , lives alone. Patient would benefit from OT services within the acute care setting to maximize level of functional " independence in the following areas self-care transfers, functional mobility, and ADLs.  From OT standpoint, recommendation at time of D/C would be Level 3: minimum resource intensity .   Goals   Patient Goals to return home and not need to use the walker   Plan   Treatment Interventions ADL retraining;Functional transfer training;Endurance training;Patient/family training;Equipment evaluation/education;Neuromuscular reeducation   Goal Expiration Date 07/27/25   OT Treatment Day 0   OT Frequency 2-3x/wk   Discharge Recommendation   Rehab Resource Intensity Level, OT III (Minimum Resource Intensity)   Additional Comments  The patient's raw score on the -PAC Daily Activity Inpatient Short Form is 19. A raw score of greater than or equal to 19 suggests the patient may benefit from discharge to home. Please refer to the recommendation of the Occupational Therapist for safe discharge planning.   AM-PAC Daily Activity Inpatient   Lower Body Dressing 3   Bathing 3   Toileting 3   Upper Body Dressing 3   Grooming 3   Eating 4   Daily Activity Raw Score 19   Daily Activity Standardized Score (Calc for Raw Score >=11) 40.22   AM-PAC Applied Cognition Inpatient   Following a Speech/Presentation 3   Understanding Ordinary Conversation 4   Taking Medications 3   Remembering Where Things Are Placed or Put Away 3   Remembering List of 4-5 Errands 3   Taking Care of Complicated Tasks 3   Applied Cognition Raw Score 19   Applied Cognition Standardized Score 39.77   End of Consult   Education Provided Yes   Patient Position at End of Consult Bed/Chair alarm activated;All needs within reach;Supine   Nurse Communication Nurse aware of consult   Goals established on initial evaluation in order to achieve pt's goal of returning home and not needing to use a RW.      Pt will complete UB ADLs Mod independent   for increased ADL independence within 10 days.     Pt will complete LB ADLs Mod independent   for increased ADL independence  within 10 days.     Pt will complete toileting Mod independent   with use of DME for increased ADL independence within 10 days.     Pt will demonstrate proper body mechanics to complete self-care transfers and functional mobility with Mod independent  and use of LRAD for increased safety and functional independence within 10 days.     Pt will demonstrate standing tolerance of 10 min for increased activity tolerance during ADL/IADL tasks within 10 days.     Pt will demonstrate proper body mechanics and fall prevention strategies during 100% of tx sessions for increased safety awareness during ADL/IADLs    Pt will participate in ongoing cognitive assessments to assist with safe D/C planning and supervision/assistance recommendations.     Pt will dispense simulated medications into weekly pill box with 100% dosing accuracy and 0 VC's for improved safety and independence during medication management upon D/C     Shelly Dobbs OT                [1]   Past Medical History:  Diagnosis Date    Anxiety     Depression     Disease of thyroid gland     Hyperlipidemia     Stroke (HCC)     Visual impairment    [2]   Past Surgical History:  Procedure Laterality Date    EYE SURGERY      cosmetic

## 2025-07-17 NOTE — PHYSICAL THERAPY NOTE
PHYSICAL THERAPY EVALUATION NOTE    Patient Name: Maryann Sampson  Today's Date: 7/17/2025  AGE:   74 y.o.  Mrn:   [id number]  ADMIT DX:  Prolonged Q-T interval on ECG [R94.31]  Near syncope [R55]    Past Medical History[1]  Length Of Stay: 0  PHYSICAL THERAPY EVALUATION :    07/17/25 1402   PT Last Visit   PT Visit Date 07/17/25   Note Type   Note type Evaluation   Pain Assessment   Pain Assessment Tool 0-10   Pain Score No Pain   Restrictions/Precautions   Other Precautions Chair Alarm;Bed Alarm;Fall Risk  (Masimo)   Home Living   Type of Home House   Home Layout Two level;Able to live on main level with bedroom/bathroom;Other (Comment)  (2 DEVONTE)   Additional Comments lives alone. friend is able to assist. ambulates w/ cane in community and w/o device in home. owns walker, rollator, and shower chair. independent w/ ADLs and IADLs. 2 falls in last 6 month.   General   Additional Pertinent History 7/17/25 at 9:09 blood pressure was 192/92   Family/Caregiver Present No   Cognition   Overall Cognitive Status WFL   Arousal/Participation Alert   Attention Attends with cues to redirect   Orientation Level Oriented to person;Other (Comment)  (pt was identified w/ full name, birth date)   Following Commands Follows one step commands with increased time or repetition   Comments room air resting pulse ox 96% and 64 BPM, active 93% and 81 BPM.   Subjective   Subjective pt seen supine in bed. agreed to PT eval. denied pain or dizziness. occasional cues were provided for task focus.   RLE Assessment   RLE Assessment WFL  (3+ to 4-/5)   LLE Assessment   LLE Assessment WFL  (3+ to 4-/5)   Vision-Basic Assessment   Current Vision Wears glasses all the time   Light Touch   RLE Light Touch Grossly intact   LLE Light Touch Grossly intact   Bed Mobility   Supine to Sit 5  Supervision   Additional items HOB elevated;Increased time required   Transfers   Sit  to Stand 5  Supervision   Additional items Increased time required  (for hand placement w/ roller walker use)   Stand to Sit 5  Supervision   Additional items Increased time required;Verbal cues  (for body positioning. direction to not abandon walker.)   Ambulation/Elevation   Gait pattern Foward flexed;Ataxia;Inconsistent angie;Decreased foot clearance   Gait Assistance 4  Minimal assist  (w/ device. supervision w/ single point cane. modified independent w/ roller walker)   Additional items Assist x 1;Verbal cues;Tactile cues  (for device placement, full step length, safety)   Assistive Device None;SPC;Rolling walker   Distance 80 feet w/o device. standing rest break. 60 feet w/ cane. standing rest break. 90 feet w/ roller walker.  (additional ambulation not possible due to fatigue)   Balance   Static Sitting Fair +   Static Standing Fair +  (w/ roller walker)   Ambulatory Fair  (w/ roller walker)   Activity Tolerance   Activity Tolerance Patient limited by fatigue   Nurse Made Aware spoke to eDepa MATAG, Shelly OT, Madeleine CM   Assessment   Problem List Decreased strength;Decreased endurance;Impaired balance;Decreased mobility;Decreased safety awareness   Assessment Pt was running lots of errands. She didn't eat or drink much in the morning prior. When unloading groceries from her cart into car she suddenly felt lightheaded and like she was going to pass out. Her legs went to jello and lowered herself to the ground. Dx: postural dizziness, presyncope, prolonged QT interval, DM, and CKD. order placed for PT eval and tx, w/ activity order of out of bed to chair. pt presents w/ comorbidities of DM, hyperlipidemia, CVA, and CKD and personal factors of anxiety, mobilizing w/ assistive device, limited home support, positive fall history, and depression. pt presents w/ weakness, decreased endurance, impaired balance, gait deviations, decreased safety awareness, and fall risk. these impairments are evident in findings from  physical examination (weakness), mobility assessment (need for standby to min assist w/ all phases of mobility when usually mobilizing independently, tolerance to only 90 feet of ambulation, and need for cueing for mobility technique), and Barthel Index: 70/100. pt needed input for task focus and mobility technique. pt is at risk for falls due to physical and safety awareness deficits. pt's clinical presentation is unstable/unpredictable (evident in poor blood pressure control, need for assist w/ all phases of mobility when usually mobilizing independently, tolerance to only 90 feet of ambulation, and need for input for task focus and mobility technique). pt needs inpatient PT tx to improve mobility deficits and progress mobility training as appropriate. Pt shows greatest improvement in mobility status w/ use of roller walker - recommend continued ambulation w/ walker at home (pt was notified of recommendation).   Goals   Patient Goals I want to go home   STG Expiration Date 07/31/25   Short Term Goal #1 pt will: Increase bilateral LE strength 1/2 grade to facilitate independent mobility, Perform bed mobility independently to increase level of independence, Perform all transfers independently to improve independence, Ambulate 300 ft. with least restrictive assistive device independently w/o LOB to improve functional independence, Navigate 2 stair(s) independently with unilateral handrail to facilitate return to previous living environment, Tolerate 3 hr OOB to faciliate upright tolerance, Tolerate standing 3 minutes independently to facilitate functional task performance, Improve Barthel Index score to 90 or greater to facilitate independence, and Complete Timed Up and Go or Comfortable Gait Speed to further assess mobility and monitor progress   PT Treatment Day 0   Plan   Treatment/Interventions Functional transfer training;LE strengthening/ROM;Therapeutic exercise;Endurance training;Patient/family  training;Equipment eval/education;Bed mobility;Gait training;Compensatory technique education   PT Frequency 3-5x/wk   Discharge Recommendation   Rehab Resource Intensity Level, PT III (Minimum Resource Intensity)   Additional Comments recommend ambulation w/ roller walker   AM-PAC Basic Mobility Inpatient   Turning in Flat Bed Without Bedrails 4   Lying on Back to Sitting on Edge of Flat Bed Without Bedrails 3   Moving Bed to Chair 3   Standing Up From Chair Using Arms 3   Walk in Room 3   Climb 3-5 Stairs With Railing 1   Basic Mobility Inpatient Raw Score 17   Basic Mobility Standardized Score 39.67   [facility] Highest Level Of Mobility   -[facility] Goal 5: Stand one or more mins   -[facility] Achieved 7: Walk 25 feet or more   Barthel Index   Feeding 10   Bathing 0   Grooming Score 5   Dressing Score 5   Bladder Score 10   Bowels Score 10   Toilet Use Score 10   Transfers (Bed/Chair) Score 10   Mobility (Level Surface) Score 10   Stairs Score 0   Barthel Index Score 70   End of Consult   Patient Position at End of Consult Bedside chair;All needs within reach;Bed/Chair alarm activated     The patient's AM-PAC Basic Mobility Inpatient Short Form Raw Score is 17. A Raw score of greater than 16 suggests the patient may benefit from discharge to home. Please also refer to the recommendation of the Physical Therapist for safe discharge planning.    Skilled PT recommended while in hospital and upon DC to progress pt toward treatment goals.     Raghu Kramer, PT         [1]   Past Medical History:  Diagnosis Date    Anxiety     Depression     Disease of thyroid gland     Hyperlipidemia     Stroke (HCC)     Visual impairment

## 2025-07-17 NOTE — ASSESSMENT & PLAN NOTE
Lab Results   Component Value Date    EGFR 56 07/17/2025    EGFR 39 07/16/2025    EGFR 45 01/15/2025    CREATININE 0.97 07/17/2025    CREATININE 1.31 (H) 07/16/2025    CREATININE 1.17 01/15/2025     CKD in setting of age-related, hypertensive nephropathy  Baseline Cr: 1.0-1.2  Admit Cr: 1.31   At baseline

## 2025-07-18 ENCOUNTER — TELEPHONE (OUTPATIENT)
Dept: FAMILY MEDICINE CLINIC | Facility: CLINIC | Age: 75
End: 2025-07-18

## 2025-07-18 VITALS
BODY MASS INDEX: 35.35 KG/M2 | WEIGHT: 175.04 LBS | HEART RATE: 61 BPM | TEMPERATURE: 98 F | RESPIRATION RATE: 16 BRPM | DIASTOLIC BLOOD PRESSURE: 89 MMHG | OXYGEN SATURATION: 92 % | SYSTOLIC BLOOD PRESSURE: 171 MMHG

## 2025-07-18 LAB
ANION GAP SERPL CALCULATED.3IONS-SCNC: 7 MMOL/L (ref 4–13)
ATRIAL RATE: 67 BPM
ATRIAL RATE: 67 BPM
BUN SERPL-MCNC: 17 MG/DL (ref 5–25)
CALCIUM SERPL-MCNC: 8.8 MG/DL (ref 8.4–10.2)
CHLORIDE SERPL-SCNC: 107 MMOL/L (ref 96–108)
CO2 SERPL-SCNC: 26 MMOL/L (ref 21–32)
CREAT SERPL-MCNC: 1.02 MG/DL (ref 0.6–1.3)
GFR SERPL CREATININE-BSD FRML MDRD: 53 ML/MIN/1.73SQ M
GLUCOSE SERPL-MCNC: 127 MG/DL (ref 65–140)
P AXIS: 40 DEGREES
P AXIS: 49 DEGREES
POTASSIUM SERPL-SCNC: 3.7 MMOL/L (ref 3.5–5.3)
PR INTERVAL: 190 MS
PR INTERVAL: 194 MS
QRS AXIS: -10 DEGREES
QRS AXIS: -12 DEGREES
QRSD INTERVAL: 82 MS
QRSD INTERVAL: 82 MS
QT INTERVAL: 452 MS
QT INTERVAL: 474 MS
QTC INTERVAL: 478 MS
QTC INTERVAL: 501 MS
SODIUM SERPL-SCNC: 140 MMOL/L (ref 135–147)
T WAVE AXIS: 17 DEGREES
T WAVE AXIS: 38 DEGREES
VENTRICULAR RATE: 67 BPM
VENTRICULAR RATE: 67 BPM

## 2025-07-18 PROCEDURE — 93010 ELECTROCARDIOGRAM REPORT: CPT | Performed by: INTERNAL MEDICINE

## 2025-07-18 PROCEDURE — 80048 BASIC METABOLIC PNL TOTAL CA: CPT | Performed by: STUDENT IN AN ORGANIZED HEALTH CARE EDUCATION/TRAINING PROGRAM

## 2025-07-18 RX ORDER — LOSARTAN POTASSIUM 50 MG/1
50 TABLET ORAL DAILY
Status: DISCONTINUED | OUTPATIENT
Start: 2025-07-18 | End: 2025-07-18 | Stop reason: HOSPADM

## 2025-07-18 RX ORDER — LOSARTAN POTASSIUM AND HYDROCHLOROTHIAZIDE 12.5; 5 MG/1; MG/1
1 TABLET ORAL DAILY
Qty: 30 TABLET | Refills: 0 | Status: SHIPPED | OUTPATIENT
Start: 2025-07-18

## 2025-07-18 RX ORDER — AMLODIPINE BESYLATE 10 MG/1
10 TABLET ORAL DAILY
Status: DISCONTINUED | OUTPATIENT
Start: 2025-07-18 | End: 2025-07-18 | Stop reason: HOSPADM

## 2025-07-18 RX ORDER — AMLODIPINE BESYLATE 10 MG/1
10 TABLET ORAL DAILY
Qty: 30 TABLET | Refills: 0 | Status: SHIPPED | OUTPATIENT
Start: 2025-07-18

## 2025-07-18 RX ORDER — HYDROCHLOROTHIAZIDE 12.5 MG/1
12.5 TABLET ORAL DAILY
Status: DISCONTINUED | OUTPATIENT
Start: 2025-07-18 | End: 2025-07-18 | Stop reason: HOSPADM

## 2025-07-18 RX ADMIN — CITALOPRAM HYDROBROMIDE 30 MG: 20 TABLET ORAL at 09:19

## 2025-07-18 RX ADMIN — PANTOPRAZOLE SODIUM 40 MG: 40 TABLET, DELAYED RELEASE ORAL at 05:43

## 2025-07-18 RX ADMIN — AMLODIPINE BESYLATE 10 MG: 10 TABLET ORAL at 09:19

## 2025-07-18 RX ADMIN — OXYBUTYNIN CHLORIDE 10 MG: 5 TABLET, EXTENDED RELEASE ORAL at 09:19

## 2025-07-18 RX ADMIN — LEVOTHYROXINE SODIUM 25 MCG: 0.03 TABLET ORAL at 05:43

## 2025-07-18 RX ADMIN — HYDROCHLOROTHIAZIDE 12.5 MG: 12.5 TABLET ORAL at 09:19

## 2025-07-18 RX ADMIN — LOSARTAN POTASSIUM 50 MG: 50 TABLET, FILM COATED ORAL at 09:19

## 2025-07-18 NOTE — PLAN OF CARE
Problem: Potential for Falls  Goal: Patient will remain free of falls  Description: INTERVENTIONS:  - Educate patient/family on patient safety including physical limitations  - Instruct patient to call for assistance with activity   - Consider consulting OT/PT to assist with strengthening/mobility based on AM PAC & JH-HLM score  - Consult OT/PT to assist with strengthening/mobility   - Keep Call bell within reach  - Keep bed low and locked with side rails adjusted as appropriate  - Keep care items and personal belongings within reach  - Initiate and maintain comfort rounds  - Make Fall Risk Sign visible to staff  - Offer Toileting every 2 Hours, in advance of need  - Initiate/Maintain 2alarm  - Obtain necessary fall risk management equipment: 2  - Apply yellow socks and bracelet for high fall risk patients  - Consider moving patient to room near nurses station  Outcome: Progressing     Problem: Prexisting or High Potential for Compromised Skin Integrity  Goal: Skin integrity is maintained or improved  Description: INTERVENTIONS:  - Identify patients at risk for skin breakdown  - Assess and monitor skin integrity including under and around medical devices   - Assess and monitor nutrition and hydration status  - Monitor labs  - Assess for incontinence   - Turn and reposition patient  - Assist with mobility/ambulation  - Relieve pressure over charito prominences   - Avoid friction and shearing  - Provide appropriate hygiene as needed including keeping skin clean and dry  - Evaluate need for skin moisturizer/barrier cream  - Collaborate with interdisciplinary team  - Patient/family teaching  - Consider wound care consult    Assess:  - Review Isra scale daily  - Clean and moisturize skin every 2  - Inspect skin when repositioning, toileting, and assisting with ADLS  - Assess under medical devices such as 2 every 2  - Assess extremities for adequate circulation and sensation     Bed Management:  - Have minimal linens on  bed & keep smooth, unwrinkled  - Change linens as needed when moist or perspiring  - Avoid sitting or lying in one position for more than 2 hours while in bed?Keep HOB at 2degrees   - Toileting:  - Offer bedside commode  - Assess for incontinence every 2  - Use incontinent care products after each incontinent episode such as 2    Activity:  - Mobilize patient 2 times a day  - Encourage activity and walks on unit  - Encourage or provide ROM exercises   - Turn and reposition patient every 2 Hours  - Use appropriate equipment to lift or move patient in bed  - Instruct/ Assist with weight shifting every 2 when out of bed in chair  - Consider limitation of chair time 2 hour intervals    Skin Care:  - Avoid use of baby powder, tape, friction and shearing, hot water or constrictive clothing  - Relieve pressure over bony prominences using 2  - Do not massage red bony areas    Next Steps:  - Teach patient strategies to minimize risks such as 2  - Consider consults to  interdisciplinary teams such as 2  Outcome: Progressing

## 2025-07-18 NOTE — CASE MANAGEMENT
Case Management Discharge Planning Note    Patient name Maryann Sampson  [facility] S /S -01 MRN [id number]  : 1950 Date 2025       Current Admission Date: 2025  Current Admission Diagnosis:Postural dizziness with presyncope   Patient Active Problem List    Diagnosis Date Noted    Prolonged QT interval 2025    T wave inversion in EKG 2025    Postural dizziness with presyncope 2025    History of subdural hematoma 2023    Benign essential tremor 2023    Neurologic gait dysfunction 2023    Stage 3a chronic kidney disease (HCC) 10/19/2022    Other specified hypothyroidism 2021    Overactive bladder 2021    Recurrent major depressive disorder, in full remission ([facility]) 2021    Obesity, morbid ([facility]) 2021    Hyperlipidemia 2020    Depression with anxiety 2020    Type 2 diabetes mellitus with chronic kidney disease, without long-term current use of insulin, unspecified CKD stage ([facility])       LOS (days): 1  Geometric Mean LOS (GMLOS) (days): 2.6  Days to GMLOS:1.8     OBJECTIVE:  Risk of Unplanned Readmission Score: 9.08         Current admission status: Inpatient   Preferred Pharmacy:   [facility] [address] [place] [address] [place] [id number]  Phone: [phone] Fax: [phone]    [facility] [address] [place] [id number]  Phone: [phone] Fax: [phone]    Primary Care Provider: Winnie Stanley DO    Primary Insurance: [facility] REP  Secondary Insurance:     DISCHARGE DETAILS:    Discharge planning discussed with:: Patient  Freedom of Choice: Yes  Comments - Freedom of Choice: Home - declining HHC, referrals cancelled  CM contacted family/caregiver?: No- see comments  Were Treatment Team discharge recommendations reviewed with patient/caregiver?: Yes  Did patient/caregiver verbalize understanding of patient care  needs?: Yes  Were patient/caregiver advised of the risks associated with not following Treatment Team discharge recommendations?: Yes    Contacts  Patient Contacts: Patient  Contact Method: In Person  Reason/Outcome: Continuity of Care, Discharge Planning    Requested Home Health Care         Is the patient interested in C at discharge?: No    Would you like to participate in our [facility] service program?  : No - Declined    Treatment Team Recommendation: Home with Home Health Care  Expected Discharge Disposition: Home or Self Care  Additional Discharge Dispositions: Home or Self Care  Transport at Discharge : Family

## 2025-07-18 NOTE — DISCHARGE INSTR - AVS FIRST PAGE
Dear Maryann Sampson,     It was our pleasure to care for you here at [facility].  It is our hope that we were always able to exceed the expected standards for your care during your stay.  You were hospitalized due to high blood pressure and syncope (passing out).  You were cared for on the 4th floor under the service of Jerry Cisneros DO with the [facility] who covers for your primary care physician (PCP), Winnie Stanley DO, while you were hospitalized.  If you have any questions or concerns related to this hospitalization, you may contact us at .  For follow up as well as medication refills, we recommend that you follow up with your primary care physician.  A registered nurse will reach out to you by phone within a few days after your discharge to answer any additional questions that you may have after going home.  However, at this time we provide for you here, the most important instructions / recommendations at discharge:     Notable Medication Adjustments -   Losartan 50mg - HCTZ 12.5- Losartan can cause cough; HCTZ is a diuretic  Amlodipine 10mg - can cause lower extremity edema and constipation.  Testing Required after Discharge -   None  ** Please contact your PCP to request testing orders for any of the testing recommended here **  Important follow up information -   With PCP in 1-2 weeks   Other Instructions -   Take blood pressure daily.   Please review this entire after visit summary as additional general instructions including medication list, appointments, activity, diet, any pertinent wound care, and other additional recommendations from your care team that may be provided for you.      Sincerely,     Jerry Cisneros DO

## 2025-07-21 ENCOUNTER — TRANSITIONAL CARE MANAGEMENT (OUTPATIENT)
Dept: FAMILY MEDICINE CLINIC | Facility: CLINIC | Age: 75
End: 2025-07-21

## 2025-07-21 ENCOUNTER — PATIENT OUTREACH (OUTPATIENT)
Dept: CASE MANAGEMENT | Facility: OTHER | Age: 75
End: 2025-07-21

## 2025-07-21 DIAGNOSIS — Z78.9 NEED FOR FOLLOW-UP BY SOCIAL WORKER: Primary | ICD-10-CM

## 2025-07-21 NOTE — UTILIZATION REVIEW
NOTIFICATION OF ADMISSION DISCHARGE   This is a Notification of Discharge from [facility]. Please be advised that this patient has been discharge from our facility. Below you will find the admission and discharge date and time including the patient’s disposition.   UTILIZATION REVIEW CONTACT:  Utilization Review Assistants  Network Utilization Review Department  Phone: [phone] x carefully listen to the prompts. All voicemails are confidential.  Email: [email or web address]     ADMISSION INFORMATION  PRESENTATION DATE: 7/16/2025  5:39 PM  OBERVATION ADMISSION DATE: 07/16/2025 2111  INPATIENT ADMISSION DATE: 7/17/25  2:14 PM   DISCHARGE DATE: 7/18/2025 12:17 PM   DISPOSITION:Home/Self Care    Network Utilization Review Department  ATTENTION: Please call with any questions or concerns to [phone] and carefully listen to the prompts so that you are directed to the right person. All voicemails are confidential.   For Discharge needs, contact Care Management DC Support Team at [phone] opt. 2  Send all requests for admission clinical reviews, approved or denied determinations and any other requests to dedicated fax number below belonging to the campus where the patient is receiving treatment. List of dedicated fax numbers for the Facilities:  FACILITY NAME UR FAX NUMBER   ADMISSION DENIALS (Administrative/Medical Necessity) [phone]   DISCHARGE SUPPORT TEAM ([facility]) [phone]   PARENT CHILD HEALTH (Maternity/NICU/Pediatrics) [phone]   [facility] [phone]   [facility] [phone]   [facility] [phone]   [facility] [phone]   [facility] [phone]   [facility] [phone]   [facility] [phone]   [facility] [phone]    [facility] [phone]   [facility] [phone]   [facility] [phone]   [facility] [phone]

## 2025-07-21 NOTE — PROGRESS NOTES
Outpatient Care Management note- CM referral, CKD     Chart review completed    Will schedule outreach for CKD

## 2025-07-22 ENCOUNTER — PATIENT OUTREACH (OUTPATIENT)
Dept: CASE MANAGEMENT | Facility: OTHER | Age: 75
End: 2025-07-22

## 2025-07-22 NOTE — PROGRESS NOTES
Outpatient Care Management note- CKD     Chart review completed    Call initiated to the patient today, left voicemail for the patient to return the call    Await call back from [facility].

## 2025-07-22 NOTE — TELEPHONE ENCOUNTER
GUERRERO for a return call.   
GUERRERO for a return call.   
TCM scheduled. Please call with questions and answer questions pt has about medications   
07-Nov-2019 04:30

## 2025-07-23 ENCOUNTER — PATIENT OUTREACH (OUTPATIENT)
Dept: CASE MANAGEMENT | Facility: OTHER | Age: 75
End: 2025-07-23

## 2025-07-23 NOTE — PROGRESS NOTES
SANDY FRANCISCO received referral for patient from Admin Coordinator via CKD report. RN CM outreached patient on 7/22 and a voicemail was left. Patient was recently hospitalized at [facility] from 7/16-7/18.    Patient does not have any prior OP SW CM outreach. Food and housing concerns noted on SDOH.    SANDY FRANCISCO placed initial outreach call to patient and left a voicemail. SANDY CM to place second outreach call within one week if return call is not received prior.

## 2025-07-24 ENCOUNTER — OFFICE VISIT (OUTPATIENT)
Dept: FAMILY MEDICINE CLINIC | Facility: CLINIC | Age: 75
End: 2025-07-24
Payer: COMMERCIAL

## 2025-07-24 VITALS
OXYGEN SATURATION: 98 % | HEART RATE: 65 BPM | TEMPERATURE: 97.6 F | SYSTOLIC BLOOD PRESSURE: 128 MMHG | HEIGHT: 59 IN | WEIGHT: 179.2 LBS | BODY MASS INDEX: 36.12 KG/M2 | DIASTOLIC BLOOD PRESSURE: 82 MMHG

## 2025-07-24 DIAGNOSIS — E11.22 TYPE 2 DIABETES MELLITUS WITH CHRONIC KIDNEY DISEASE, WITHOUT LONG-TERM CURRENT USE OF INSULIN, UNSPECIFIED CKD STAGE (HCC): ICD-10-CM

## 2025-07-24 DIAGNOSIS — R55 POSTURAL DIZZINESS WITH PRESYNCOPE: Primary | ICD-10-CM

## 2025-07-24 DIAGNOSIS — R42 POSTURAL DIZZINESS WITH PRESYNCOPE: Primary | ICD-10-CM

## 2025-07-24 PROCEDURE — 99496 TRANSJ CARE MGMT HIGH F2F 7D: CPT | Performed by: FAMILY MEDICINE

## 2025-07-25 ENCOUNTER — PATIENT OUTREACH (OUTPATIENT)
Dept: CASE MANAGEMENT | Facility: OTHER | Age: 75
End: 2025-07-25

## 2025-07-25 NOTE — LETTER
07/25/25    Dear Maryann Sampson,    I am a Care Manager with [facility].  We have made several attempts to call you by phone.  It is important that you contact us back at [phone] so that we can assist with your care needs.     Sincerely,         Fabienne RILEY  Outpatient Social Work Care Manager

## 2025-07-25 NOTE — PROGRESS NOTES
SANDY FRANCISCO placed second outreach call to patient and left a voicemail. SANDY FRANCISCO mailed out Unable to Reach letter. Referral closed.

## 2025-08-05 ENCOUNTER — PATIENT OUTREACH (OUTPATIENT)
Dept: CASE MANAGEMENT | Facility: OTHER | Age: 75
End: 2025-08-05

## 2025-08-21 ENCOUNTER — PATIENT OUTREACH (OUTPATIENT)
Dept: CASE MANAGEMENT | Facility: OTHER | Age: 75
End: 2025-08-21